# Patient Record
Sex: FEMALE | Race: WHITE | NOT HISPANIC OR LATINO | Employment: OTHER | URBAN - METROPOLITAN AREA
[De-identification: names, ages, dates, MRNs, and addresses within clinical notes are randomized per-mention and may not be internally consistent; named-entity substitution may affect disease eponyms.]

---

## 2018-01-11 ENCOUNTER — AMBULATORY - HEALTHEAST (OUTPATIENT)
Dept: MULTI SPECIALTY CLINIC | Facility: CLINIC | Age: 63
End: 2018-01-11

## 2018-01-11 ENCOUNTER — RECORDS - HEALTHEAST (OUTPATIENT)
Dept: ADMINISTRATIVE | Facility: OTHER | Age: 63
End: 2018-01-11

## 2019-04-08 ENCOUNTER — OFFICE VISIT - HEALTHEAST (OUTPATIENT)
Dept: INTERNAL MEDICINE | Facility: CLINIC | Age: 64
End: 2019-04-08

## 2019-04-08 ENCOUNTER — COMMUNICATION - HEALTHEAST (OUTPATIENT)
Dept: INTERNAL MEDICINE | Facility: CLINIC | Age: 64
End: 2019-04-08

## 2019-04-08 ENCOUNTER — COMMUNICATION - HEALTHEAST (OUTPATIENT)
Dept: TELEHEALTH | Facility: CLINIC | Age: 64
End: 2019-04-08

## 2019-04-08 DIAGNOSIS — Z00.00 MEDICARE ANNUAL WELLNESS VISIT, SUBSEQUENT: ICD-10-CM

## 2019-04-08 DIAGNOSIS — M25.50 MULTIPLE JOINT PAIN: ICD-10-CM

## 2019-04-08 DIAGNOSIS — B00.1 RECURRENT COLD SORES: ICD-10-CM

## 2019-04-08 DIAGNOSIS — Z71.84 TRAVEL ADVICE ENCOUNTER: ICD-10-CM

## 2019-04-08 DIAGNOSIS — Z12.4 CERVICAL CANCER SCREENING: ICD-10-CM

## 2019-04-08 DIAGNOSIS — K12.0 APHTHOUS ULCER: ICD-10-CM

## 2019-04-08 DIAGNOSIS — Z12.31 VISIT FOR SCREENING MAMMOGRAM: ICD-10-CM

## 2019-04-08 DIAGNOSIS — Z87.892 HISTORY OF ANAPHYLAXIS: ICD-10-CM

## 2019-04-08 DIAGNOSIS — Z78.0 ASYMPTOMATIC MENOPAUSAL STATE: ICD-10-CM

## 2019-04-08 RX ORDER — FEXOFENADINE HCL 180 MG/1
180 TABLET ORAL
Status: SHIPPED | COMMUNITY
Start: 2010-07-06

## 2019-04-08 RX ORDER — HYDROCORTISONE ACETATE 0.5 %
1 CREAM (GRAM) TOPICAL
Status: SHIPPED | COMMUNITY
Start: 2016-09-01

## 2019-04-08 RX ORDER — FERROUS SULFATE 325(65) MG
1 TABLET ORAL
Status: SHIPPED | COMMUNITY
Start: 2019-04-08

## 2019-04-08 RX ORDER — OMEGA-3-ACID ETHYL ESTERS 1 G/1
1000 CAPSULE, LIQUID FILLED ORAL
Status: SHIPPED | COMMUNITY
Start: 2016-09-01

## 2019-04-08 ASSESSMENT — MIFFLIN-ST. JEOR: SCORE: 1184.91

## 2019-04-18 ENCOUNTER — OFFICE VISIT - HEALTHEAST (OUTPATIENT)
Dept: MIDWIFE SERVICES | Facility: CLINIC | Age: 64
End: 2019-04-18

## 2019-04-18 DIAGNOSIS — Z12.4 CERVICAL CANCER SCREENING: ICD-10-CM

## 2019-04-18 ASSESSMENT — MIFFLIN-ST. JEOR: SCORE: 1184.06

## 2019-04-23 LAB
HPV SOURCE: NORMAL
HUMAN PAPILLOMA VIRUS 16 DNA: NEGATIVE
HUMAN PAPILLOMA VIRUS 18 DNA: NEGATIVE
HUMAN PAPILLOMA VIRUS FINAL DIAGNOSIS: NORMAL
HUMAN PAPILLOMA VIRUS OTHER HR: NEGATIVE
SPECIMEN DESCRIPTION: NORMAL

## 2019-04-26 ENCOUNTER — AMBULATORY - HEALTHEAST (OUTPATIENT)
Dept: OTHER | Facility: CLINIC | Age: 64
End: 2019-04-26

## 2019-06-07 ENCOUNTER — AMBULATORY - HEALTHEAST (OUTPATIENT)
Dept: OTHER | Facility: CLINIC | Age: 64
End: 2019-06-07

## 2019-07-15 ENCOUNTER — HOSPITAL ENCOUNTER (OUTPATIENT)
Dept: MAMMOGRAPHY | Facility: CLINIC | Age: 64
Discharge: HOME | End: 2019-07-15

## 2019-07-15 ENCOUNTER — RECORDS - HEALTHEAST (OUTPATIENT)
Dept: ADMINISTRATIVE | Facility: OTHER | Age: 64
End: 2019-07-15

## 2019-07-15 ENCOUNTER — RECORDS - HEALTHEAST (OUTPATIENT)
Dept: BONE DENSITY | Facility: CLINIC | Age: 64
End: 2019-07-15

## 2019-07-15 DIAGNOSIS — Z78.0 ASYMPTOMATIC MENOPAUSAL STATE: ICD-10-CM

## 2019-07-15 DIAGNOSIS — Z00.00 ENCOUNTER FOR GENERAL ADULT MEDICAL EXAMINATION WITHOUT ABNORMAL FINDINGS: ICD-10-CM

## 2019-07-15 DIAGNOSIS — Z12.31 VISIT FOR SCREENING MAMMOGRAM: ICD-10-CM

## 2019-07-16 ENCOUNTER — RECORDS - HEALTHEAST (OUTPATIENT)
Dept: ADMINISTRATIVE | Facility: OTHER | Age: 64
End: 2019-07-16

## 2019-07-16 ENCOUNTER — RECORDS - HEALTHEAST (OUTPATIENT)
Dept: RADIOLOGY | Facility: CLINIC | Age: 64
End: 2019-07-16

## 2019-09-05 ENCOUNTER — COMMUNICATION - HEALTHEAST (OUTPATIENT)
Dept: INTERNAL MEDICINE | Facility: CLINIC | Age: 64
End: 2019-09-05

## 2019-09-05 DIAGNOSIS — M25.50 MULTIPLE JOINT PAIN: ICD-10-CM

## 2019-09-14 ENCOUNTER — THERAPY VISIT (OUTPATIENT)
Dept: PHYSICAL THERAPY | Facility: CLINIC | Age: 64
End: 2019-09-14
Payer: COMMERCIAL

## 2019-09-14 DIAGNOSIS — M25.552 HIP PAIN, LEFT: Primary | ICD-10-CM

## 2019-09-14 PROCEDURE — 97161 PT EVAL LOW COMPLEX 20 MIN: CPT | Mod: GP | Performed by: PHYSICAL THERAPIST

## 2019-09-14 PROCEDURE — 97112 NEUROMUSCULAR REEDUCATION: CPT | Mod: GP | Performed by: PHYSICAL THERAPIST

## 2019-09-14 NOTE — PROGRESS NOTES
[facility] Initial Evaluation  Subjective:  [facility]                  [facility]  Physical Therapy Initial Examination/Evaluation  September 14, 2019    Esther Prince is a 64 year old  female referred to physical therapy by Dr. Fernandez for treatment of greater trochanteric bursitis with Precautions/Restrictions/MD instructions none    KEY PT FINDINGS:  1.  TTP piriformis  2.  Mild functional valgus in SL squat  3.  S/p FINA on contralateral limb    Subjective:  Referring MD visit date: 9/11/19  DOI/onset: 9/4/19  Mechanism of injury: On a bike trip, was playing pickle ball when she fell.  She continued to bike on the trip, hip became progressively more and more sore.  Unable to bike the last day, difficulty walking and sleeping.  Saw MD - underwent CSI, with significant relief of pain.   DOS R FINA - 2015  Previous treatment: CSI  Imaging: xray  Chief Complaint:   Can't walk fast, unable to sleep on side.  Can't stand up in spin class. Pain with ER and severe IR  Pain: best 0 /10, worst 5/10 lateral pain Described as: sharp Alleviated by: CSI Frequency: intermittent Progression of symptoms since initial onset: improving Time of day when pain is worse: not related  Sleeping: unable to sleep on side  Social history:  Cycling, paddling, X[employer] skiing    Occupation: Retired - [employer]  Job duties:  none    Current HEP/exercise regimen: see above  Patient's goals are reduce pain    Pertinent PMH: none   General Health Reported by Patient: excellent  Return to MD:  PRN         Lower Extremity Exam    Dynamic Movement Screen:  2 leg stance: WNL - toed out on R  2 leg squat:Normal    1 leg stance:   Right: normal  Left: normal    1 leg squat:   Right: normal  Left: excessive femoral IR/ADD    Gait: WNL      Quad Activation Normal Normal     Knee Joint AROM   Right Left Difference   Hyperextension 0 deg 0 deg 0 deg   Extension 0 deg 0  deg 0 deg   Flexion 140 deg 140 deg 0 deg     Palpation:   Tender to palpation at the following structures: piriformis    Hip Joint PROM Screen   Right Left Difference   Flex 100 deg 100 deg 0 deg   ER 60 deg 45 deg 15 deg   IR 20 deg 20 deg 0 deg     Lower Extremity Muscle Strength (x/5)   Right Left   Hip ABD 5/5 5-/5     Lower Extremity Flexibility Screen:   Right Left   Gastroc/ Soleus - -   - = normal  + = mild tightness  ++ = moderate tightness  +++ = significant tightness    Foot Screen:   neutral calcaneal orientation          Objective:  System    Physical Exam    General     ROS    Assessment/Plan:    Patient is a 64 year old female with left side hip complaints.    Patient has the following significant findings with corresponding treatment plan.                Diagnosis 1:  Greater trochanteric burisitis    Pain -  hot/cold therapy, US, electric stimulation, manual therapy, splint/taping/bracing/orthotics, self management, education and home program  Decreased ROM/flexibility - manual therapy and therapeutic exercise  Decreased joint mobility - manual therapy and therapeutic exercise  Decreased strength - therapeutic exercise and therapeutic activities  Impaired balance - neuro re-education and therapeutic activities  Decreased proprioception - neuro re-education and therapeutic activities  Impaired muscle performance - neuro re-education  Decreased function - therapeutic activities    Therapy Evaluation Codes:   1) History comprised of:   Personal factors that impact the plan of care:      None.    Comorbidity factors that impact the plan of care are:      None.     Medications impacting care: None.  2) Examination of Body Systems comprised of:   Body structures and functions that impact the plan of care:      Hip.   Activity limitations that impact the plan of care are:      Standing, Walking and Laying down.  3) Clinical presentation characteristics  are:   Stable/Uncomplicated.  4) Decision-Making    Low complexity using standardized patient assessment instrument and/or measureable assessment of functional outcome.  Cumulative Therapy Evaluation is: Low complexity.    Previous and current functional limitations:  (See Goal Flow Sheet for this information)    Short term and Long term goals: (See Goal Flow Sheet for this information)     Communication ability:  Patient appears to be able to clearly communicate and understand verbal and written communication and follow directions correctly.  Treatment Explanation - The following has been discussed with the patient:   RX ordered/plan of care  Anticipated outcomes  Possible risks and side effects  This patient would benefit from PT intervention to resume normal activities.   Rehab potential is good.    Frequency:  1 X week, once daily  Duration:  for 6 weeks  Discharge Plan:  Achieve all LTG.  Independent in home treatment program.  Reach maximal therapeutic benefit.    Please refer to the daily flowsheet for treatment today, total treatment time and time spent performing 1:1 timed codes.

## 2019-09-14 NOTE — LETTER
[facility]  [address] [address]  [phone]    2019    Re: Esther Prince   :   1955  MRN:  [id number]   REFERRING PHYSICIAN:   Nikko Fernandez    [facility]  Date of Initial Evaluation:  19  Visits:  Rxs Used: 1  Reason for Referral:  Hip pain, left    EVALUATION SUMMARY    [facility] Initial Evaluation  Subjective:                    [facility]  Physical Therapy Initial Examination/Evaluation    2019    Esther Prince is a 64 year old  female referred to physical therapy by Dr. Fernandez for treatment of greater trochanteric bursitis with Precautions/Restrictions/MD instructions none    KEY PT FINDINGS:  1.  TTP piriformis  2.  Mild functional valgus in SL squat  3.  S/p FINA on contralateral limb    Subjective:  Referring MD visit date: 19  DOI/onset: 19  Mechanism of injury: On a bike trip, was playing pickle ball when she fell.  She continued to bike on the trip, hip became progressively more and more sore.  Unable to bike the last day, difficulty walking and sleeping.  Saw MD - underwent CSI, with significant relief of pain.   DOS R FINA -   Previous treatment: CSI  Imaging: xray  Chief Complaint:   Can't walk fast, unable to sleep on side.  Can't stand up in spin class. Pain with ER and severe IR  Pain: best 0 /10, worst 5/10 lateral pain Described as: sharp Alleviated by: CSI Frequency: intermittent Progression of symptoms since initial onset: improving Time of day when pain is worse: not related  Sleeping: unable to sleep on side  Social history:  Cycling, paddling, X[employer] skiing    Occupation: Retired - [employer]  Job duties:  none    Current HEP/exercise regimen: see above  Patient's goals are reduce pain    Pertinent PMH: none   General Health Reported by Patient: excellent  Return to MD:  PRN   Re:  Esther Murrell Suresh   :   1955      Lower Extremity Exam    Dynamic Movement Screen:  2 leg stance: WNL - toed out on R  2 leg squat:Normal    1 leg stance:   Right: normal  Left: normal    1 leg squat:   Right: normal  Left: excessive femoral IR/ADD    Gait: WNL      Quad Activation Normal Normal     Knee Joint AROM   Right Left Difference   Hyperextension 0 deg 0 deg 0 deg   Extension 0 deg 0 deg 0 deg   Flexion 140 deg 140 deg 0 deg     Palpation:   Tender to palpation at the following structures: piriformis    Hip Joint PROM Screen   Right Left Difference   Flex 100 deg 100 deg 0 deg   ER 60 deg 45 deg 15 deg   IR 20 deg 20 deg 0 deg     Lower Extremity Muscle Strength (x/5)   Right Left   Hip ABD 5/5 5-/5     Lower Extremity Flexibility Screen:   Right Left   Gastroc/ Soleus - -   - = normal  + = mild tightness  ++ = moderate tightness  +++ = significant tightness    Foot Screen:   neutral calcaneal orientation    Assessment/Plan:    Patient is a 64 year old female with left side hip complaints.    Patient has the following significant findings with corresponding treatment plan.                Re: Esther Murrell Suresh   :   1955    Diagnosis 1:  Greater trochanteric burisitis    Pain -  hot/cold therapy, US, electric stimulation, manual therapy, splint/taping/bracing/orthotics, self management, education and home program  Decreased ROM/flexibility - manual therapy and therapeutic exercise  Decreased joint mobility - manual therapy and therapeutic exercise  Decreased strength - therapeutic exercise and therapeutic activities  Impaired balance - neuro re-education and therapeutic activities  Decreased proprioception - neuro re-education and therapeutic activities  Impaired muscle performance - neuro re-education  Decreased function - therapeutic activities    Therapy Evaluation Codes:   1) History comprised of:   Personal factors that impact the plan of care:      None.    Comorbidity factors that impact  the plan of care are:      None.     Medications impacting care: None.  2) Examination of Body Systems comprised of:   Body structures and functions that impact the plan of care:      Hip.   Activity limitations that impact the plan of care are:      Standing, Walking and Laying down.  3) Clinical presentation characteristics are:   Stable/Uncomplicated.  4) Decision-Making    Low complexity using standardized patient assessment instrument and/or measureable assessment of functional outcome.  Cumulative Therapy Evaluation is: Low complexity.    Previous and current functional limitations:  (See Goal Flow Sheet for this information)    Short term and Long term goals: (See Goal Flow Sheet for this information)     Communication ability:  Patient appears to be able to clearly communicate and understand verbal and written communication and follow directions correctly.  Treatment Explanation - The following has been discussed with the patient:   RX ordered/plan of care  Anticipated outcomes  Possible risks and side effects  This patient would benefit from PT intervention to resume normal activities.   Rehab potential is good.    Frequency:  1 X week, once daily/ Duration:  for 6 weeks  Discharge Plan:  Achieve all LTG, Independent in home treatment program, Reach maximal therapeutic benefit.    Please refer to the daily flowsheet for treatment today, total treatment time and time spent performing 1:1 timed codes.     Thank you for your referral.    INQUIRIES  Therapist: Zeina Oliveros DPT  [facility] [address] [address]

## 2019-09-18 ENCOUNTER — HOSPITAL ENCOUNTER (OUTPATIENT)
Dept: ULTRASOUND IMAGING | Facility: HOSPITAL | Age: 64
Discharge: HOME | End: 2019-09-18
Attending: INTERNAL MEDICINE

## 2019-09-18 ENCOUNTER — OFFICE VISIT - HEALTHEAST (OUTPATIENT)
Dept: INTERNAL MEDICINE | Facility: CLINIC | Age: 64
End: 2019-09-18

## 2019-09-18 DIAGNOSIS — Z00.00 ENCOUNTER FOR MEDICARE ANNUAL WELLNESS EXAM: ICD-10-CM

## 2019-09-18 DIAGNOSIS — Z11.59 ENCOUNTER FOR HEPATITIS C SCREENING TEST FOR LOW RISK PATIENT: ICD-10-CM

## 2019-09-18 DIAGNOSIS — Z00.00 HEALTH MAINTENANCE EXAMINATION: ICD-10-CM

## 2019-09-18 DIAGNOSIS — D72.829 LEUKOCYTOSIS, UNSPECIFIED TYPE: ICD-10-CM

## 2019-09-18 DIAGNOSIS — E78.2 MIXED HYPERLIPIDEMIA: ICD-10-CM

## 2019-09-18 DIAGNOSIS — B00.1 HERPES LABIALIS: ICD-10-CM

## 2019-09-18 DIAGNOSIS — Z23 FLU VACCINE NEED: ICD-10-CM

## 2019-09-18 DIAGNOSIS — R10.2 SUPRAPUBIC ABDOMINAL PAIN: ICD-10-CM

## 2019-09-18 DIAGNOSIS — Z23 NEED FOR PNEUMOCOCCAL VACCINATION: ICD-10-CM

## 2019-09-18 LAB
ALBUMIN SERPL-MCNC: 4.1 G/DL (ref 3.5–5)
ALBUMIN UR-MCNC: NEGATIVE MG/DL
ALP SERPL-CCNC: 75 U/L (ref 45–120)
ALT SERPL W P-5'-P-CCNC: 20 U/L (ref 0–45)
ANION GAP SERPL CALCULATED.3IONS-SCNC: 9 MMOL/L (ref 5–18)
APPEARANCE UR: CLEAR
AST SERPL W P-5'-P-CCNC: 21 U/L (ref 0–40)
BILIRUB DIRECT SERPL-MCNC: 0.3 MG/DL
BILIRUB SERPL-MCNC: 0.9 MG/DL (ref 0–1)
BILIRUB UR QL STRIP: NEGATIVE
BUN SERPL-MCNC: 13 MG/DL (ref 8–22)
CALCIUM SERPL-MCNC: 10 MG/DL (ref 8.5–10.5)
CHLORIDE BLD-SCNC: 103 MMOL/L (ref 98–107)
CHOLEST SERPL-MCNC: 199 MG/DL
CO2 SERPL-SCNC: 25 MMOL/L (ref 22–31)
COLOR UR AUTO: YELLOW
CREAT SERPL-MCNC: 0.93 MG/DL (ref 0.6–1.1)
ERYTHROCYTE [DISTWIDTH] IN BLOOD BY AUTOMATED COUNT: 11.5 % (ref 11–14.5)
ERYTHROCYTE [SEDIMENTATION RATE] IN BLOOD BY WESTERGREN METHOD: 11 MM/HR (ref 0–20)
FASTING STATUS PATIENT QL REPORTED: NO
GFR SERPL CREATININE-BSD FRML MDRD: 60 ML/MIN/1.73M2
GLUCOSE BLD-MCNC: 88 MG/DL (ref 70–125)
GLUCOSE UR STRIP-MCNC: NEGATIVE MG/DL
HCT VFR BLD AUTO: 42.5 % (ref 35–47)
HDLC SERPL-MCNC: 92 MG/DL
HGB BLD-MCNC: 14 G/DL (ref 12–16)
HGB UR QL STRIP: NEGATIVE
KETONES UR STRIP-MCNC: ABNORMAL MG/DL
LDLC SERPL CALC-MCNC: 96 MG/DL
LEUKOCYTE ESTERASE UR QL STRIP: NEGATIVE
MCH RBC QN AUTO: 30.7 PG (ref 27–34)
MCHC RBC AUTO-ENTMCNC: 33 G/DL (ref 32–36)
MCV RBC AUTO: 93 FL (ref 80–100)
NITRATE UR QL: NEGATIVE
PH UR STRIP: 5.5 [PH] (ref 5–8)
PLATELET # BLD AUTO: 259 THOU/UL (ref 140–440)
PMV BLD AUTO: 7.6 FL (ref 7–10)
POTASSIUM BLD-SCNC: 4.9 MMOL/L (ref 3.5–5)
PROT SERPL-MCNC: 6.8 G/DL (ref 6–8)
RBC # BLD AUTO: 4.57 MILL/UL (ref 3.8–5.4)
SODIUM SERPL-SCNC: 137 MMOL/L (ref 136–145)
SP GR UR STRIP: 1.02 (ref 1–1.03)
TRIGL SERPL-MCNC: 54 MG/DL
UROBILINOGEN UR STRIP-ACNC: ABNORMAL
WBC: 18.1 THOU/UL (ref 4–11)

## 2019-09-18 ASSESSMENT — MIFFLIN-ST. JEOR: SCORE: 1167.73

## 2019-09-19 ENCOUNTER — COMMUNICATION - HEALTHEAST (OUTPATIENT)
Dept: INTERNAL MEDICINE | Facility: CLINIC | Age: 64
End: 2019-09-19

## 2019-09-19 ENCOUNTER — THERAPY VISIT (OUTPATIENT)
Dept: PHYSICAL THERAPY | Facility: CLINIC | Age: 64
End: 2019-09-19
Payer: COMMERCIAL

## 2019-09-19 DIAGNOSIS — M25.552 LEFT HIP PAIN: Primary | ICD-10-CM

## 2019-09-19 DIAGNOSIS — D72.820 LYMPHOCYTOSIS: ICD-10-CM

## 2019-09-19 LAB
BASOPHILS # BLD AUTO: 0.1 THOU/UL (ref 0–0.2)
BASOPHILS NFR BLD AUTO: 0 % (ref 0–2)
EOSINOPHIL # BLD AUTO: 0.1 THOU/UL (ref 0–0.4)
EOSINOPHIL NFR BLD AUTO: 0 % (ref 0–6)
ERYTHROCYTE [DISTWIDTH] IN BLOOD BY AUTOMATED COUNT: 12.9 % (ref 11–14.5)
HCT VFR BLD AUTO: 42 % (ref 35–47)
HCV AB SERPL QL IA: NEGATIVE
HGB BLD-MCNC: 13.5 G/DL (ref 12–16)
LAB AP CHARGES (HE HISTORICAL CONVERSION): ABNORMAL
LYMPHOCYTES # BLD AUTO: 8.7 THOU/UL (ref 0.8–4.4)
LYMPHOCYTES NFR BLD AUTO: 49 % (ref 20–40)
MCH RBC QN AUTO: 30 PG (ref 27–34)
MCHC RBC AUTO-ENTMCNC: 32.1 G/DL (ref 32–36)
MCV RBC AUTO: 93 FL (ref 80–100)
MONOCYTES # BLD AUTO: 1 THOU/UL (ref 0–0.9)
MONOCYTES NFR BLD AUTO: 6 % (ref 2–10)
NEUTROPHILS # BLD AUTO: 8 THOU/UL (ref 2–7.7)
NEUTROPHILS NFR BLD AUTO: 45 % (ref 50–70)
PATH REPORT.COMMENTS IMP SPEC: ABNORMAL
PATH REPORT.COMMENTS IMP SPEC: ABNORMAL
PATH REPORT.FINAL DX SPEC: ABNORMAL
PATH REPORT.MICROSCOPIC SPEC OTHER STN: ABNORMAL
PATH REPORT.MICROSCOPIC SPEC OTHER STN: ABNORMAL
PATH REPORT.RELEVANT HX SPEC: ABNORMAL
PLATELET # BLD AUTO: 241 THOU/UL (ref 140–440)
PMV BLD AUTO: 10 FL (ref 8.5–12.5)
RBC # BLD AUTO: 4.5 MILL/UL (ref 3.8–5.4)
RESULT FLAG (HE HISTORICAL CONVERSION): ABNORMAL
WBC: 17.9 THOU/UL (ref 4–11)

## 2019-09-19 PROCEDURE — 97110 THERAPEUTIC EXERCISES: CPT | Mod: GP | Performed by: PHYSICAL THERAPIST

## 2019-09-19 PROCEDURE — 97140 MANUAL THERAPY 1/> REGIONS: CPT | Mod: GP | Performed by: PHYSICAL THERAPIST

## 2019-09-19 PROCEDURE — 97112 NEUROMUSCULAR REEDUCATION: CPT | Mod: GP | Performed by: PHYSICAL THERAPIST

## 2019-09-25 ENCOUNTER — COMMUNICATION - HEALTHEAST (OUTPATIENT)
Dept: INTERNAL MEDICINE | Facility: CLINIC | Age: 64
End: 2019-09-25

## 2019-09-25 DIAGNOSIS — K12.0 APHTHOUS ULCER: ICD-10-CM

## 2019-09-27 ENCOUNTER — COMMUNICATION - HEALTHEAST (OUTPATIENT)
Dept: ADMINISTRATIVE | Facility: HOSPITAL | Age: 64
End: 2019-09-27

## 2019-09-27 ENCOUNTER — THERAPY VISIT (OUTPATIENT)
Dept: PHYSICAL THERAPY | Facility: CLINIC | Age: 64
End: 2019-09-27
Payer: COMMERCIAL

## 2019-09-27 ENCOUNTER — COMMUNICATION - HEALTHEAST (OUTPATIENT)
Dept: INTERNAL MEDICINE | Facility: CLINIC | Age: 64
End: 2019-09-27

## 2019-09-27 DIAGNOSIS — M25.552 HIP PAIN, LEFT: Primary | ICD-10-CM

## 2019-09-27 PROCEDURE — 97110 THERAPEUTIC EXERCISES: CPT | Mod: GP | Performed by: PHYSICAL THERAPIST

## 2019-09-27 PROCEDURE — 97140 MANUAL THERAPY 1/> REGIONS: CPT | Mod: GP | Performed by: PHYSICAL THERAPIST

## 2019-09-27 NOTE — PROGRESS NOTES
S: Pt reports good compliance with HEP.  Up and down sx change from onset.  O: TTP: left IT band and glut med  SL squat valgus  A:  Pt is independent with a HEP, has not achieved goals but is working towards them thru HEP and is in agreement with discharge to self directed.  : cont self directed

## 2019-10-08 ENCOUNTER — OFFICE VISIT - HEALTHEAST (OUTPATIENT)
Dept: ONCOLOGY | Facility: HOSPITAL | Age: 64
End: 2019-10-08

## 2019-10-08 DIAGNOSIS — C91.10 CHRONIC LYMPHOCYTIC LEUKEMIA (H): ICD-10-CM

## 2019-10-08 DIAGNOSIS — D72.820 LYMPHOCYTOSIS: ICD-10-CM

## 2019-10-23 ENCOUNTER — COMMUNICATION - HEALTHEAST (OUTPATIENT)
Dept: INTERNAL MEDICINE | Facility: CLINIC | Age: 64
End: 2019-10-23

## 2019-10-23 DIAGNOSIS — B00.1 HERPES LABIALIS: ICD-10-CM

## 2019-10-24 ENCOUNTER — COMMUNICATION - HEALTHEAST (OUTPATIENT)
Dept: INTERNAL MEDICINE | Facility: CLINIC | Age: 64
End: 2019-10-24

## 2019-10-24 DIAGNOSIS — M25.50 MULTIPLE JOINT PAIN: ICD-10-CM

## 2020-03-11 ENCOUNTER — HEALTH MAINTENANCE LETTER (OUTPATIENT)
Age: 65
End: 2020-03-11

## 2020-03-23 ENCOUNTER — COMMUNICATION - HEALTHEAST (OUTPATIENT)
Dept: ONCOLOGY | Facility: HOSPITAL | Age: 65
End: 2020-03-23

## 2020-03-23 ENCOUNTER — COMMUNICATION - HEALTHEAST (OUTPATIENT)
Dept: INTERNAL MEDICINE | Facility: CLINIC | Age: 65
End: 2020-03-23

## 2020-10-19 ENCOUNTER — OFFICE VISIT - HEALTHEAST (OUTPATIENT)
Dept: INTERNAL MEDICINE | Facility: CLINIC | Age: 65
End: 2020-10-19

## 2020-10-19 DIAGNOSIS — E78.2 MIXED HYPERLIPIDEMIA: ICD-10-CM

## 2020-10-19 DIAGNOSIS — Z12.31 ENCOUNTER FOR SCREENING MAMMOGRAM FOR BREAST CANCER: ICD-10-CM

## 2020-10-19 DIAGNOSIS — C91.10 CHRONIC LYMPHOCYTIC LEUKEMIA (H): ICD-10-CM

## 2020-10-19 DIAGNOSIS — Z23 NEED FOR INFLUENZA VACCINATION: ICD-10-CM

## 2020-10-19 DIAGNOSIS — Z00.00 ENCOUNTER FOR ANNUAL WELLNESS VISIT (AWV) IN MEDICARE PATIENT: ICD-10-CM

## 2020-10-19 DIAGNOSIS — Z00.00 HEALTH MAINTENANCE EXAMINATION: ICD-10-CM

## 2020-10-19 LAB
ALBUMIN SERPL-MCNC: 4.2 G/DL (ref 3.5–5)
ALP SERPL-CCNC: 74 U/L (ref 45–120)
ALT SERPL W P-5'-P-CCNC: 20 U/L (ref 0–45)
ANION GAP SERPL CALCULATED.3IONS-SCNC: 9 MMOL/L (ref 5–18)
AST SERPL W P-5'-P-CCNC: 29 U/L (ref 0–40)
BASOPHILS # BLD AUTO: 0 THOU/UL (ref 0–0.2)
BASOPHILS NFR BLD AUTO: 0 % (ref 0–2)
BILIRUB SERPL-MCNC: 0.4 MG/DL (ref 0–1)
BUN SERPL-MCNC: 22 MG/DL (ref 8–22)
CALCIUM SERPL-MCNC: 9.5 MG/DL (ref 8.5–10.5)
CHLORIDE BLD-SCNC: 105 MMOL/L (ref 98–107)
CHOLEST SERPL-MCNC: 218 MG/DL
CO2 SERPL-SCNC: 25 MMOL/L (ref 22–31)
CREAT SERPL-MCNC: 0.88 MG/DL (ref 0.6–1.1)
EOSINOPHIL COUNT (ABSOLUTE): 0.3 THOU/UL (ref 0–0.4)
EOSINOPHIL NFR BLD AUTO: 2 % (ref 0–6)
ERYTHROCYTE [DISTWIDTH] IN BLOOD BY AUTOMATED COUNT: 12.9 % (ref 11–14.5)
FASTING STATUS PATIENT QL REPORTED: ABNORMAL
GFR SERPL CREATININE-BSD FRML MDRD: >60 ML/MIN/1.73M2
GLUCOSE BLD-MCNC: 89 MG/DL (ref 70–125)
HCT VFR BLD AUTO: 42 % (ref 35–47)
HDLC SERPL-MCNC: 85 MG/DL
HGB BLD-MCNC: 13 G/DL (ref 12–16)
IMMATURE GRANULOCYTE % - MAN (DIFF): 0 %
IMMATURE GRANULOCYTE ABSOLUTE - MAN (DIFF): 0 THOU/UL
LDLC SERPL CALC-MCNC: 114 MG/DL
LYMPHOCYTES # BLD AUTO: 10 THOU/UL (ref 0.8–4.4)
LYMPHOCYTES NFR BLD AUTO: 59 % (ref 20–40)
MCH RBC QN AUTO: 29.1 PG (ref 27–34)
MCHC RBC AUTO-ENTMCNC: 31 G/DL (ref 32–36)
MCV RBC AUTO: 94 FL (ref 80–100)
MONOCYTES # BLD AUTO: 0.5 THOU/UL (ref 0–0.9)
MONOCYTES NFR BLD AUTO: 3 % (ref 2–10)
OVALOCYTES: ABNORMAL
PLAT MORPH BLD: NORMAL
PLATELET # BLD AUTO: 219 THOU/UL (ref 140–440)
PMV BLD AUTO: 10.2 FL (ref 8.5–12.5)
POTASSIUM BLD-SCNC: 3.6 MMOL/L (ref 3.5–5)
PROT SERPL-MCNC: 6.9 G/DL (ref 6–8)
RBC # BLD AUTO: 4.47 MILL/UL (ref 3.8–5.4)
SMUDGE CELLS BLD QL SMEAR: PRESENT
SODIUM SERPL-SCNC: 139 MMOL/L (ref 136–145)
TOTAL NEUTROPHILS-ABS(DIFF): 6.1 THOU/UL (ref 2–7.7)
TOTAL NEUTROPHILS-REL(DIFF): 36 % (ref 50–70)
TRIGL SERPL-MCNC: 93 MG/DL
WBC: 16.9 THOU/UL (ref 4–11)

## 2020-10-19 ASSESSMENT — MIFFLIN-ST. JEOR: SCORE: 1168.18

## 2020-10-20 ENCOUNTER — OFFICE VISIT - HEALTHEAST (OUTPATIENT)
Dept: ONCOLOGY | Facility: HOSPITAL | Age: 65
End: 2020-10-20

## 2020-10-20 DIAGNOSIS — C91.10 CHRONIC LYMPHOCYTIC LEUKEMIA (H): ICD-10-CM

## 2020-10-21 ENCOUNTER — HOSPITAL ENCOUNTER (OUTPATIENT)
Dept: MAMMOGRAPHY | Facility: CLINIC | Age: 65
Discharge: HOME | End: 2020-10-21
Attending: INTERNAL MEDICINE

## 2020-10-21 ENCOUNTER — COMMUNICATION - HEALTHEAST (OUTPATIENT)
Dept: INTERNAL MEDICINE | Facility: CLINIC | Age: 65
End: 2020-10-21

## 2020-10-21 DIAGNOSIS — Z12.31 ENCOUNTER FOR SCREENING MAMMOGRAM FOR BREAST CANCER: ICD-10-CM

## 2020-10-21 LAB — C REACTIVE PROTEIN LHE: <0.1 MG/DL (ref 0–0.8)

## 2021-01-03 ENCOUNTER — HEALTH MAINTENANCE LETTER (OUTPATIENT)
Age: 66
End: 2021-01-03

## 2021-01-11 ENCOUNTER — COMMUNICATION - HEALTHEAST (OUTPATIENT)
Dept: INTERNAL MEDICINE | Facility: CLINIC | Age: 66
End: 2021-01-11

## 2021-01-26 ENCOUNTER — COMMUNICATION - HEALTHEAST (OUTPATIENT)
Dept: INTERNAL MEDICINE | Facility: CLINIC | Age: 66
End: 2021-01-26

## 2021-01-26 DIAGNOSIS — Z87.892 HISTORY OF ANAPHYLAXIS: ICD-10-CM

## 2021-01-27 RX ORDER — EPINEPHRINE 0.3 MG/.3ML
0.3 INJECTION SUBCUTANEOUS PRN
Qty: 2 | Refills: 2 | Status: SHIPPED | OUTPATIENT
Start: 2021-01-27 | End: 2024-05-16

## 2021-03-08 ENCOUNTER — COMMUNICATION - HEALTHEAST (OUTPATIENT)
Dept: INTERNAL MEDICINE | Facility: CLINIC | Age: 66
End: 2021-03-08

## 2021-03-30 ENCOUNTER — OFFICE VISIT - HEALTHEAST (OUTPATIENT)
Dept: INTERNAL MEDICINE | Facility: CLINIC | Age: 66
End: 2021-03-30

## 2021-03-30 DIAGNOSIS — M79.662 PAIN OF LEFT LOWER LEG: ICD-10-CM

## 2021-03-30 RX ORDER — TRIAMCINOLONE ACETONIDE 0.1 %
PASTE (GRAM) DENTAL
Qty: 5 G | Refills: 1 | Status: SHIPPED | OUTPATIENT
Start: 2021-03-30 | End: 2021-10-22

## 2021-04-09 ENCOUNTER — OFFICE VISIT - HEALTHEAST (OUTPATIENT)
Dept: PHYSICAL THERAPY | Facility: REHABILITATION | Age: 66
End: 2021-04-09

## 2021-04-09 DIAGNOSIS — M25.662 DECREASED RANGE OF MOTION OF LEFT LOWER EXTREMITY: ICD-10-CM

## 2021-04-09 DIAGNOSIS — M25.552 LEFT HIP PAIN: ICD-10-CM

## 2021-04-09 DIAGNOSIS — M54.10 RADICULAR PAIN OF LEFT LOWER EXTREMITY: ICD-10-CM

## 2021-04-14 ENCOUNTER — OFFICE VISIT - HEALTHEAST (OUTPATIENT)
Dept: PHYSICAL THERAPY | Facility: REHABILITATION | Age: 66
End: 2021-04-14

## 2021-04-14 DIAGNOSIS — M54.10 RADICULAR PAIN OF LEFT LOWER EXTREMITY: ICD-10-CM

## 2021-04-14 DIAGNOSIS — M25.662 DECREASED RANGE OF MOTION OF LEFT LOWER EXTREMITY: ICD-10-CM

## 2021-04-14 DIAGNOSIS — M25.552 LEFT HIP PAIN: ICD-10-CM

## 2021-04-21 ENCOUNTER — OFFICE VISIT - HEALTHEAST (OUTPATIENT)
Dept: PHYSICAL THERAPY | Facility: REHABILITATION | Age: 66
End: 2021-04-21

## 2021-04-21 DIAGNOSIS — M54.10 RADICULAR PAIN OF LEFT LOWER EXTREMITY: ICD-10-CM

## 2021-04-21 DIAGNOSIS — M25.662 DECREASED RANGE OF MOTION OF LEFT LOWER EXTREMITY: ICD-10-CM

## 2021-04-21 DIAGNOSIS — M25.552 LEFT HIP PAIN: ICD-10-CM

## 2021-04-23 ENCOUNTER — OFFICE VISIT - HEALTHEAST (OUTPATIENT)
Dept: PHYSICAL THERAPY | Facility: REHABILITATION | Age: 66
End: 2021-04-23

## 2021-04-23 DIAGNOSIS — M25.552 LEFT HIP PAIN: ICD-10-CM

## 2021-04-23 DIAGNOSIS — M25.662 DECREASED RANGE OF MOTION OF LEFT LOWER EXTREMITY: ICD-10-CM

## 2021-04-23 DIAGNOSIS — M54.10 RADICULAR PAIN OF LEFT LOWER EXTREMITY: ICD-10-CM

## 2021-04-25 ENCOUNTER — HEALTH MAINTENANCE LETTER (OUTPATIENT)
Age: 66
End: 2021-04-25

## 2021-04-28 ENCOUNTER — OFFICE VISIT - HEALTHEAST (OUTPATIENT)
Dept: PHYSICAL THERAPY | Facility: REHABILITATION | Age: 66
End: 2021-04-28

## 2021-04-28 DIAGNOSIS — M25.552 LEFT HIP PAIN: ICD-10-CM

## 2021-04-28 DIAGNOSIS — M25.662 DECREASED RANGE OF MOTION OF LEFT LOWER EXTREMITY: ICD-10-CM

## 2021-04-28 DIAGNOSIS — M54.10 RADICULAR PAIN OF LEFT LOWER EXTREMITY: ICD-10-CM

## 2021-05-24 ENCOUNTER — RECORDS - HEALTHEAST (OUTPATIENT)
Dept: ADMINISTRATIVE | Facility: CLINIC | Age: 66
End: 2021-05-24

## 2021-05-25 ENCOUNTER — RECORDS - HEALTHEAST (OUTPATIENT)
Dept: ADMINISTRATIVE | Facility: CLINIC | Age: 66
End: 2021-05-25

## 2021-05-27 ENCOUNTER — OFFICE VISIT - HEALTHEAST (OUTPATIENT)
Dept: PHYSICAL THERAPY | Facility: REHABILITATION | Age: 66
End: 2021-05-27

## 2021-05-27 DIAGNOSIS — M25.552 LEFT HIP PAIN: ICD-10-CM

## 2021-05-27 DIAGNOSIS — M25.662 DECREASED RANGE OF MOTION OF LEFT LOWER EXTREMITY: ICD-10-CM

## 2021-05-27 DIAGNOSIS — M54.10 RADICULAR PAIN OF LEFT LOWER EXTREMITY: ICD-10-CM

## 2021-05-27 NOTE — PATIENT INSTRUCTIONS - HE
goodrx    Whole Foods, Plant-Based    Abundant vegetables.    Only whole grains.    2-4 fruits/day.    Avoid animal products such as dairy, eggs and meat. (instead, take a vitamin b12 supplement)    Avoid sugars, oils and other processed foods.    Documentary: [place]     Web: Nutritionstudies.org, Nutritionfacts.org    Books: How Not to Die (Mimi), The [place] Study (Rahul)      Start taking probiotics daily before leaving.  May take pepto bismol preventatively prior to eating or may take if develop diarrhea. If not having significant abdominal pain, fever or bloody stools, may take immodium for diarrhea. If prescribed an antibiotic, may take this if immodium is not effective and having many watery stools. If develop significant abdominal pain, fever or bloody stools, seek medical attention and don't take immodium or any antibiotic.        Food: Recommend only consuming food from reputable sources and avoiding street food.  Avoid fresh vegetables and fruits without a peel.  Eat only food that is fresh and steaming hot.    Drink: Stay hydrated.  Drink only bottled beverages that are still sealed.  Do not use ice.  Use sterile water (bottled, boiled and cooled, adequately filtered/treated) for drinking, brushing teeth.  Don't open mouth in shower.    Mosquito/insect avoidance: Cover skin with clothing as much as possible.  Apply insect repellant regularly.  Avoid being outdoors at times of day when local mosquitos are most active.  Use mosquito nets.  Can treat clothing with insect repellant.     Sun: Apply sunscreen regularly.    Swimming: Avoid swimming in freshwater.  Avoid walking barefoot at any time including beaches.    Animals: Avoid local animals as much as possible.  If you are bitten by an animal, seek medical attention immediately for possible rabies treatment/prevention.    DVT (blood clot) prevention: Recommend getting up to walk every 1-2 hours while in transit. Frequently flex/extend  ankles and knees. Avoid sedatives/alcohol. Consider wearing graduated compression stockings 15-30 mmhg.    Medical: Take along adequate supply of any medication you use regularly. Seek out a reputable clinic/hospital that you can utilize if needed.  I recommend that you acquire health insurance/evacuation insurance for your trip. Visit CDC website: https://wwwnc.cdc.gov/travel/page/getting-health-care-abroad    Infectious disease:  Standard infectious disease precautions should be exercised abroad regarding frequent hand washing/, sexual contact, etc. Avoid contact with body fluids when possible and seek medical attention if there is contact with body fluid.     Packing: I recommend that you consult the cdc recommendations for items to consider packing: https://wwwnc.cdc.gov/travel/page/pack-smart

## 2021-05-27 NOTE — PROGRESS NOTES
Assessment and Plan:       1. Visit for screening mammogram     - Mammo Screening Bilateral; Future    2. Aphthous ulcer     - triamcinolone (KENALOG) 0.1 % paste; Apply to teeth 3 (three) times a day for 5 days.  Dispense: 5 g; Refill: 1    3. Recurrent cold sores     - valACYclovir (VALTREX) 1000 MG tablet; Take 2 tablets (2,000 mg total) by mouth 2 (two) times a day for 1 day.  Dispense: 12 tablet; Refill: 1    4. Medicare annual wellness visit, subsequent     - DXA Bone Density Scan; Future    5. Multiple joint pain     - diclofenac sodium (VOLTAREN) 1 % Gel; Apply 2 g topically every 6 (six) hours as needed. Apply 2 grams per application.  Dispense: 1 Tube; Refill: 1    6. History of anaphylaxis     - EPINEPHrine (EPIPEN 2-DAVID) 0.3 mg/0.3 mL injection; Inject 0.3 mL (0.3 mg total) into the shoulder, thigh, or buttocks as needed for anaphylaxis.  Dispense: 2 Pre-filled Pen Syringe; Refill: 0    7. Cervical cancer screening     - Ambulatory referral to Gynecology    8. Travel advice encounter     - azithromycin (ZITHROMAX) 500 MG tablet; Take 2 tablets (1,000 mg total) by mouth once for 1 dose.  Dispense: 2 tablet; Refill: 0  - typhoid (VIVOTIF) SR capsule; Take 1 capsule by mouth every other day for 4 doses.  Dispense: 4 capsule; Refill: 0    9. Asymptomatic menopausal state      - DXA Bone Density Scan; Future     The patient's current medical problems were reviewed.    Has had both pneumococcal vaccinations, has healthcare directive printed and given to us today.  Is in need of DEXA scan, had colonoscopy very recently, they recommended repeating in 10 years.  Will need one more cervical cancer screen but after that if it is normal we will not need any more, I did refer her to gynecology since I was able to visualize her cervix.  Return to clinic in 1 year.  I have had an Advance Directives discussion with the patient.  The following health maintenance schedule was reviewed with the patient and provided in  printed form in the after visit summary:   Health Maintenance   Topic Date Due     MAMMOGRAM  1955     TD 18+ HE  02/14/1971     ADVANCE DIRECTIVES DISCUSSED WITH PATIENT  02/14/1971     COLONOSCOPY  02/14/2003     DXA SCAN  02/14/2018     PNEUMOCOCCAL POLYSACCHARIDE VACCINE AGE 65 AND OVER  02/14/2018     PNEUMOCOCCAL CONJUGATE VACCINE FOR ADULTS (PCV13 OR PREVNAR)  02/14/2018     ZOSTER VACCINES (1 of 2) 09/25/2018     FALL RISK ASSESSMENT  04/08/2020     INFLUENZA VACCINE RULE BASED  Completed        Subjective:   Chief Complaint: Esther Prince is an 64 y.o. female here for an annual Medicare visit.   HPI:      Healthy Habits:   Occupation:retired  Marital status:  Alcohol use:n  Tobacco use:n  Illicit drug use:n      Concern for abuse or neglect:no    Last visit to dentist:y  Optometrist:y        No vaginal bleeding    May have had abnormal pap smear - long time ago.  Has had regular good pap smears recently.   Needs a Dexa  Would like acyclovir for cold sores  Uses triamcinolone dental paste for aphthous ulcers.    azithro for [place]  Would like diclofenac gel for joints, uses intermittently    Has healthcare directive copy with her.    Immunization status: up to date and documented, has had both pneumonia vaccinations, needs vivotif.    Current Diet:  well balanced diet  Amount Consumed Per Day  balanced    Exercise Type: elliptical, bike  Exercise Frequency: Daily exercise      Depression Screening Tool:  phq 2    Depression Screening Tool Score:  0  Cognitive Impairment and Additional Screening:  No difficulty.    Functional Ability/Level of Safety  Fall Risk Factors:  none  Home Safety Risk Factors: none    Advanced Directive:  has healthcare directive/poa    Get up and go <12 sec      Review of Systems:     Please see above.  The rest of the review of systems are negative for all systems.    Patient Care Team:  Provider, No Primary Care as PCP - General     There is no problem  list on file for this patient.    No past medical history on file.   No past surgical history on file.   No family history on file.   Social History     Socioeconomic History     Marital status:      Spouse name: Not on file     Number of children: Not on file     Years of education: Not on file     Highest education level: Not on file   Occupational History     Not on file   Social Needs     Financial resource strain: Not on file     Food insecurity:     Worry: Not on file     Inability: Not on file     Transportation needs:     Medical: Not on file     Non-medical: Not on file   Tobacco Use     Smoking status: Never Smoker     Smokeless tobacco: Never Used   Substance and Sexual Activity     Alcohol use: Not on file     Drug use: Not on file     Sexual activity: Not on file   Lifestyle     Physical activity:     Days per week: Not on file     Minutes per session: Not on file     Stress: Not on file   Relationships     Social connections:     Talks on phone: Not on file     Gets together: Not on file     Attends Sikhism service: Not on file     Active member of club or organization: Not on file     Attends meetings of clubs or organizations: Not on file     Relationship status: Not on file     Intimate partner violence:     Fear of current or ex partner: Not on file     Emotionally abused: Not on file     Physically abused: Not on file     Forced sexual activity: Not on file   Other Topics Concern     Not on file   Social History Narrative     Not on file       Current Outpatient Medications   Medication Sig Dispense Refill     acetaminophen (TYLENOL) 325 MG tablet Take 325 mg by mouth.       acyclovir (ZOVIRAX) 400 MG tablet Take 400 mg by mouth.       calcium, as carbonate, (MRPB-[id number]-747) 500 mg calcium (1,250 mg) tablet Take 1 tablet by mouth.       EPINEPHrine (EPIPEN 2-DAVID) 0.3 mg/0.3 mL injection Inject 0.3 mL (0.3 mg total) into the shoulder, thigh, or buttocks as needed for anaphylaxis. 2  "Pre-filled Pen Syringe 0     ferrous sulfate 325 (65 FE) MG tablet Take 1 tablet by mouth daily with breakfast.       fexofenadine (ALLEGRA ALLERGY) 180 MG tablet Take 180 mg by mouth.       fluticasone propionate (FLONASE) 50 mcg/actuation nasal spray 1 spray.       glucosam bernstein dip-chondroit-C-Mn 582-041-21-3 mg cap Take 1 capsule by mouth.       multivitamin (ONE A DAY) per tablet Take 1 tablet by mouth.       omega-3 acid ethyl esters (LOVAZA) 1 gram capsule Take 1,000 mg by mouth.       azithromycin (ZITHROMAX) 500 MG tablet Take 2 tablets (1,000 mg total) by mouth once for 1 dose. 2 tablet 0     diclofenac sodium (VOLTAREN) 1 % Gel Apply 2 g topically every 6 (six) hours as needed. Apply 2 grams per application. 1 Tube 1     triamcinolone (KENALOG) 0.1 % paste Apply to teeth 3 (three) times a day for 5 days. 5 g 1     typhoid (VIVOTIF) SR capsule Take 1 capsule by mouth every other day for 4 doses. 4 capsule 0     valACYclovir (VALTREX) 1000 MG tablet Take 2 tablets (2,000 mg total) by mouth 2 (two) times a day for 1 day. 12 tablet 1     No current facility-administered medications for this visit.       Objective:   Vital Signs:   Visit Vitals  /66 (Patient Site: Left Arm, Patient Position: Sitting, Cuff Size: Adult Regular)   Pulse 70   Ht 5' 7\" (1.702 m)   Wt 137 lb 3 oz (62.2 kg)   SpO2 98%   BMI 21.49 kg/m           VisionScreening:   Visual Acuity Screening    Right eye Left eye Both eyes   Without correction:      With correction: 20/16 20/16 20/16        PHYSICAL EXAM  Gen: NAD, conversant, appears age, well-kempt  Skin: warm, dry, no rash, pallor cyanosis  HENT: normocephalic atraumatic, MMM, no oral lesions, otorrhea, rhinorrhea. TM's normal bilaterally.  Eyes: non-icteric, extra-ocular movements intact, PERRL, conjunctivae not injected. Holding eyes open comfortably, no drainage.  CV: NRRR no m/r/g, no peripheral edema. no JVD.  Resp: CTAB no w/r/r, normal respiratory effort  GI: soft, " non-tender, non-distended. No masses.  MSK: no muscle or joint swelling.  Neuro: no dysarthria or gross asymmetry  Psych: full affect, oriented x 3  Lymph: No significant cervical lymphadenopathy  Hematologic: No petechiae or purpura.    Some white clumps in vaginal vault.  Unable to fully visualize cervix.    Assessment Results 4/8/2019   Activities of Daily Living No help needed   Instrumental Activities of Daily Living No help needed   Get Up and Go Score Less than 12 seconds   Mini Cog Total Score 5   Some recent data might be hidden     A Mini Cog score of 0-2 suggests the possibility of dementia, score of 3-5 suggests no dementia    Identified Health Risks:

## 2021-05-27 NOTE — PROGRESS NOTES
Assessment:   1.  Cervical cancer screening     Plan:      1. Discussed nutrition and exercise.    2. Blood tests: declines all HM /screening labs  3. Breast self exam technique reviewed and patient encouraged to perform self-exam monthly.  Encouraged to attend scheduled screening mammogram.  4. Contraception: Postmenopausal  5.  Next pap due today.  Discussed [place] preventative task force recommendation for cervical cancer screening through 65 years of age.  Considering patient's monogamous long-term relationship, today may be at the last Pap smear indicated.  6.  RTC 1 year for annual physical exam, PRN    Subjective:      Esther Prince is a 64 y.o. female who presents for cervical cancer screening.        Immunization History   Administered Date(s) Administered     Hep A, Adult IM (19yr & older) 2007, 2014     Hep B, Adult 2017, 2017, 2018     Influenza high dose, seasonal 2018     Influenza, seasonal,quad inj 36+ mos 2013     Influenza,seasonal quad, PF, 36+MOS 2014, 2015, 2016, 2017     Influenza,seasonal, Inj IIV3 2009, 2010, 2011, 10/14/2012     Tdap 10/01/2009, 2014     Typhoid, Inj, Inactive 2014, 2017     ZOSTER, LIVE 2013     ZOSTER, RECOMBINANT, IM 2018, 2018         Gynecologic History  No LMP recorded. Patient is postmenopausal.  Contraception: post menopausal status    Cancer screening:   Last Pap: 2013. Results were: normal      OB History    Para Term  AB Living   0 0 0 0 0 0   SAB TAB Ectopic Multiple Live Births   0 0 0 0 0       Current Outpatient Medications   Medication Sig Dispense Refill     calcium, as carbonate, ([id number]) 500 mg calcium (1,250 mg) tablet Take 1 tablet by mouth.       diclofenac sodium (VOLTAREN) 1 % Gel Apply 2 g topically every 6 (six) hours as needed. Apply 2 grams per application. 1 Tube 1     ferrous sulfate 325 (65 FE)  MG tablet Take 1 tablet by mouth daily with breakfast.       glucosam bernstein dip-chondroit-C-Mn 448-450-02-3 mg cap Take 1 capsule by mouth.       multivitamin (ONE A DAY) per tablet Take 1 tablet by mouth.       omega-3 acid ethyl esters (LOVAZA) 1 gram capsule Take 1,000 mg by mouth.       acetaminophen (TYLENOL) 325 MG tablet Take 325 mg by mouth.       acyclovir (ZOVIRAX) 400 MG tablet Take 400 mg by mouth.       EPINEPHrine (EPIPEN 2-DAVID) 0.3 mg/0.3 mL injection Inject 0.3 mL (0.3 mg total) into the shoulder, thigh, or buttocks as needed for anaphylaxis. 2 Pre-filled Pen Syringe 0     fexofenadine (ALLEGRA ALLERGY) 180 MG tablet Take 180 mg by mouth.       fluticasone propionate (FLONASE) 50 mcg/actuation nasal spray 1 spray.       No current facility-administered medications for this visit.      History reviewed. No pertinent past medical history.  Past Surgical History:   Procedure Laterality Date     TOTAL HIP ARTHROPLASTY Right 2015     Patient has no known allergies.  Family History   Problem Relation Age of Onset     Arthritis Mother      Dementia Mother      Diabetes type II Mother      Stroke Mother      Heart disease Father      Stroke Father      Arthritis Sister      Alcohol abuse Brother      Stroke Paternal Grandfather      Early death Paternal Grandfather      Diabetes type I Maternal Aunt      Early death Maternal Aunt      Stroke Maternal Aunt      Heart disease Paternal Aunt      Stroke Paternal Aunt      Stroke Paternal Uncle      Social History     Socioeconomic History     Marital status:      Spouse name: Constantino     Number of children: Not on file     Years of education: 18     Highest education level: Master's degree (e.g., MA, MS, [employer], MEd, MSW, [employer])   Occupational History     Occupation: Retired   Social Needs     Financial resource strain: Not on file     Food insecurity:     Worry: Not on file     Inability: Not on file     Transportation needs:     Medical: Not on file      "Non-medical: Not on file   Tobacco Use     Smoking status: Never Smoker     Smokeless tobacco: Never Used   Substance and Sexual Activity     Alcohol use: Not on file     Drug use: Not on file     Sexual activity: Not on file   Lifestyle     Physical activity:     Days per week: Not on file     Minutes per session: Not on file     Stress: Not on file   Relationships     Social connections:     Talks on phone: Not on file     Gets together: Not on file     Attends Congregation service: Not on file     Active member of club or organization: Not on file     Attends meetings of clubs or organizations: Not on file     Relationship status: Not on file     Intimate partner violence:     Fear of current or ex partner: Not on file     Emotionally abused: Not on file     Physically abused: Not on file     Forced sexual activity: Not on file   Other Topics Concern     Not on file   Social History Narrative     Not on file       Review of Systems  General:  Denies problem  Eyes: Denies problem  Ears/Nose/Throat: Denies problem  Cardiovascular: Denies problem  Respiratory:  Denies problem  Gastrointestinal:  denies problems  Genitourinary: denies problems  Musculoskeletal:  Denies problem  Skin: Denies problem  Neurologic:denies problems  Psychiatric: denies problems  Endocrine: denies problems        Objective:         Vitals:    04/18/19 1213   BP: 106/64   Pulse: 68   Weight: 137 lb (62.1 kg)   Height: 5' 7\" (1.702 m)       Physical Exam:  General Appearance: Alert, cooperative, no distress, appears stated age  Skin: Skin color, texture, turgor normal, no rashes or lesions  Pelvic:External genitalia normal without lesions or irritation.  Mucosa is pale and somewhat dry.  Vagina and cervix show no lesions, inflammation, discharge or tenderness. No cystocele, No rectocele. Uterus & adnexal normal without masses or tenderness.       "

## 2021-06-01 ENCOUNTER — COMMUNICATION - HEALTHEAST (OUTPATIENT)
Dept: INTERNAL MEDICINE | Facility: CLINIC | Age: 66
End: 2021-06-01
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
## 2021-06-01 NOTE — TELEPHONE ENCOUNTER
RN cannot approve Refill Request    RN can NOT refill this medication med is not covered by policy/route to provider. Last office visit: Visit date not found Last Physical: 4/8/2019 Last MTM visit: Visit date not found Last visit same specialty: Visit date not found.  Next visit within 3 mo: Visit date not found  Next physical within 3 mo: Visit date not found      Cristin Sneed, [facility] Triage/Med Refill 9/5/2019    Requested Prescriptions   Pending Prescriptions Disp Refills     diclofenac sodium (VOLTAREN) 1 % Gel [Pharmacy Med Name: DICLOFENAC SODIUM 1% GEL] 100 g 1     Sig: APPLY 2 G TOPICALLY EVERY 6 (SIX) HOURS AS NEEDED. APPLY 2 GRAMS PER APPLICATION.       There is no refill protocol information for this order

## 2021-06-01 NOTE — TELEPHONE ENCOUNTER
Called pt . All labs , US discussed . No further action needed for those . White count is high , lymphocytosis noted . Has been chronic so unlikely due to malignancy but CLL is a possibility so will refer to a hem otologist  To see if further work up is needed. Pt agreed .

## 2021-06-01 NOTE — PATIENT INSTRUCTIONS - HE
Labs future fasting .     Advance Directive  Patient s advance directive was discussed and I am comfortable with the patient s wishes.  Patient Education   Personalized Prevention Plan  You are due for the preventive services outlined below.  Your care team is available to assist you in scheduling these services.  If you have already completed any of these items, please share that information with your care team to update in your medical record.  Health Maintenance   Topic Date Due     HEPATITIS C SCREENING  1955     PNEUMOCOCCAL POLYSACCHARIDE VACCINE AGE 65 AND OVER  02/14/2018     PNEUMOCOCCAL CONJUGATE VACCINE FOR ADULTS (PCV13 OR PREVNAR)  02/14/2018     INFLUENZA VACCINE RULE BASED (1) 08/01/2019     COLONOSCOPY  09/26/2019 (Originally 2/14/2003)     FALL RISK ASSESSMENT  04/08/2020     MEDICARE ANNUAL WELLNESS VISIT  04/18/2020     MAMMOGRAM  07/15/2021     DXA SCAN  07/15/2021     ADVANCE CARE PLANNING  06/07/2024     TD 18+ HE  09/08/2024     ZOSTER VACCINES  Completed

## 2021-06-01 NOTE — TELEPHONE ENCOUNTER
RN cannot approve Refill Request    RN can NOT refill this medication Protocol failed and NO refill given       . Last office visit: Visit date not found Last Physical: 4/8/2019 Last MTM visit: Visit date not found Last visit same specialty: Visit date not found.  Next visit within 3 mo: Visit date not found  Next physical within 3 mo: Visit date not found      Ivett Domingo, [facility] Triage/Med Refill 9/26/2019    Requested Prescriptions   Pending Prescriptions Disp Refills     triamcinolone (KENALOG) 0.1 % paste [Pharmacy Med Name: TRIAMCINOLONE 0.1% PASTE] 5 g 1     Sig: APPLY TO TEETH 3 (THREE) TIMES A DAY FOR 5 DAYS.       There is no refill protocol information for this order

## 2021-06-01 NOTE — TELEPHONE ENCOUNTER
Cami,    Can we make sure they call her cell phone?    Thank you.    Kathy PICKARD LPN .......... 1:03 PM  09/27/19

## 2021-06-01 NOTE — PROGRESS NOTES
Assessment and Plan:   1. Flu vaccine need    - Influenza High Dose,Seasonal,PF 65+ Yrs    2. Health maintenance examination  Colon 2108 no polyps    mammo every other year   dexa 2019   Pap 2019 normal     3. Mixed hyperlipidemia  Past history of mild hyperlipidemia we will order future labs because she is not fasting today.  - Lipid Furman FASTING; Future  - Glucose; Future  - Lipid Furman FASTING    4. Encounter for hepatitis C screening test for low risk patient  Should get one-time screening due to age  - Hepatitis C Antibody (Anti-HCV); Future  - Hepatitis C Antibody (Anti-HCV)    5. Suprapubic abdominal pain  Nonspecific suprapubic abdominal pain that was just a one-time episode lasted for about an hour.  Associated with mild constipation.  No significant urinary changes, otherwise bowel habits have been regular she passes stool twice a day on a routine basis.  No increase in bloating.  Has been otherwise healthy with no nausea, loss of appetite.  No change in weight.  No fever or night sweats.  Reassured her that because she is absolutely pain-free now we will do some basic evaluation check her blood count urine analysis.  And transvaginal ultrasound just because she has some mild hot flashes and history of mild adnexal pain.  If these tests are all normal she does not really need further work-up unless the pain relapses.  - Basic Metabolic Panel  - HM2(CBC w/o Differential)  - Urinalysis-UC if Indicated  - Hepatic Profile  - Erythrocyte Sedimentation Rate  - US Pelvis With Transvaginal Non OB; Future    6. Need for pneumococcal vaccination  She is due for her main first coccal shot.  And  - Pneumococcal polysaccharide vaccine 23-valent 3 yo or older, subq/IM    7. Leukocytosis, unspecified type  Her white count has been high in the past will repeat today.  In review her past history  - Morphology,Smear Review (MORP)    8. Encounter for Medicare annual wellness exam  Her mini cog is excellent there are  no concerns with her IADLs and ADLs he lives independently.  Has an excellent lifestyle.  No issues with her mental health, sleep balance or memory.    9. Herpes labialis  She has recurrent cold sores for which she takes Valtrex for breakouts.      Overall very pleasant patient in excellent health    The patient's current medical problems were reviewed.    I have had an Advance Directives discussion with the patient.  The following health maintenance schedule was reviewed with the patient and provided in printed form in the after visit summary:   Health Maintenance   Topic Date Due     HEPATITIS C SCREENING  1955     PNEUMOCOCCAL POLYSACCHARIDE VACCINE AGE 65 AND OVER  02/14/2018     PNEUMOCOCCAL CONJUGATE VACCINE FOR ADULTS (PCV13 OR PREVNAR)  02/14/2018     INFLUENZA VACCINE RULE BASED (1) 08/01/2019     COLONOSCOPY  09/26/2019 (Originally 2/14/2003)     FALL RISK ASSESSMENT  04/08/2020     MEDICARE ANNUAL WELLNESS VISIT  04/18/2020     MAMMOGRAM  07/15/2021     DXA SCAN  07/15/2021     ADVANCE CARE PLANNING  06/07/2024     TD 18+ HE  09/08/2024     ZOSTER VACCINES  Completed        Subjective:   Chief Complaint: Esther Prince is an 64 y.o. female here for an Annual Wellness visit.   HPI:    Chief Complaint   Patient presents with     Annual Wellness Visit     recent ab pain     Pain in suprapubic pain intense . Has had some hot flashes . Lasted only one hour , no change in bowel habits or urine . Didn't feel like eating but now is back to normal  Review of Systems:  Please see above.  The rest of the review of systems are negative for all systems.    Patient Care Team:  Provider, No Primary Care as PCP - General     Patient Active Problem List   Diagnosis     Herpes labialis     Seasonal allergic rhinitis     Health maintenance examination     No past medical history on file.   Past Surgical History:   Procedure Laterality Date     TOTAL HIP ARTHROPLASTY Right 2015      Family History   Problem Relation Age  of Onset     Arthritis Mother      Dementia Mother      Diabetes type II Mother      Stroke Mother      Heart disease Father      Stroke Father      Arthritis Sister      Alcohol abuse Brother      Stroke Paternal Grandfather      Early death Paternal Grandfather      Diabetes type I Maternal Aunt      Early death Maternal Aunt      Stroke Maternal Aunt      Heart disease Paternal Aunt      Stroke Paternal Aunt      Stroke Paternal Uncle       Social History     Socioeconomic History     Marital status:      Spouse name: [facility]     Number of children: Not on file     Years of education: 18     Highest education level: Master's degree (e.g., MA, MS, [employer], MEd, MSW, [employer])   Occupational History     Occupation: Retired   Social Needs     Financial resource strain: Not on file     Food insecurity:     Worry: Not on file     Inability: Not on file     Transportation needs:     Medical: Not on file     Non-medical: Not on file   Tobacco Use     Smoking status: Never Smoker     Smokeless tobacco: Never Used   Substance and Sexual Activity     Alcohol use: Not on file     Drug use: Not on file     Sexual activity: Not on file   Lifestyle     Physical activity:     Days per week: Not on file     Minutes per session: Not on file     Stress: Not on file   Relationships     Social connections:     Talks on phone: Not on file     Gets together: Not on file     Attends Denominational service: Not on file     Active member of club or organization: Not on file     Attends meetings of clubs or organizations: Not on file     Relationship status: Not on file     Intimate partner violence:     Fear of current or ex partner: Not on file     Emotionally abused: Not on file     Physically abused: Not on file     Forced sexual activity: Not on file   Other Topics Concern     Not on file   Social History Narrative    Lives with   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       acyclovir (ZOVIRAX) 400 MG tablet Take 400 mg by mouth.       calcium carbonate-vitamin D3 600 mg calcium- 200 unit cap Take by mouth.       calcium, as carbonate, ([id number]) 500 mg calcium (1,250 mg) tablet Take 1 tablet by mouth.       diclofenac sodium (VOLTAREN) 1 % Gel APPLY 2 G TOPICALLY EVERY 6 (SIX) HOURS AS NEEDED. APPLY 2 GRAMS PER APPLICATION.  1     EPINEPHrine (EPIPEN 2-DAVID) 0.3 mg/0.3 mL injection Inject 0.3 mL (0.3 mg total) into the shoulder, thigh, or buttocks as needed for anaphylaxis. 2 Pre-filled Pen Syringe 0     EPINEPHrine (EPIPEN JR) 0.15 mg/0.3 mL injection Inject into the shoulder, thigh, or buttocks.       ferrous sulfate 325 (65 FE) MG tablet Take 1 tablet by mouth daily with breakfast.       fexofenadine (ALLEGRA ALLERGY) 180 MG tablet Take 180 mg by mouth.       fluticasone propionate (FLONASE) 50 mcg/actuation nasal spray 1 spray.       glucosam bernstein dip-chondroit-C-Mn 517-518-10-3 mg cap Take 1 capsule by mouth.       multivitamin (ONE A DAY) per tablet Take 1 tablet by mouth.       omega-3 acid ethyl esters (LOVAZA) 1 gram capsule Take 1,000 mg by mouth.       triamcinolone (KENALOG) 0.1 % paste APPLY TO TEETH 3 (THREE) TIMES A DAY FOR 5 DAYS.  1     No current facility-administered medications for this visit.       Objective:   Vital Signs:   Visit Vitals  /62 (Patient Site: Right Arm)   Pulse 60   Ht 5' 7\" (1.702 m)   Wt 133 lb 6.4 oz (60.5 kg)   SpO2 97%   BMI 20.89 kg/m         VisionScreening:  No exam data present     PHYSICAL EXAM  Physical Examination: General appearance - alert, well appearing, and in no distress  Mental status - alert, oriented to person, place, and time  Neck - supple, no significant adenopathy  Lymphatics - no palpable lymphadenopathy, no hepatosplenomegaly  Chest - clear to auscultation, no wheezes, rales or rhonchi, symmetric air entry  Heart - normal rate, " regular rhythm, normal S1, S2, no murmurs, rubs, clicks or gallops  Abdomen - soft, nontender, nondistended, no masses or organomegaly  Breasts - breasts appear normal, no suspicious masses, no skin or nipple changes or axillary nodes  Pelvic - normal external genitalia, vulva, vagina, cervix, uterus and adnexa are very minimal tenderness in the right adnexa and some suprapubic tenderness bladder is not enlarged  Musculoskeletal - no joint tenderness, deformity or swelling  Extremities - peripheral pulses normal, no pedal edema, no clubbing or cyanosis  Skin - normal coloration and turgor, no rashes, no suspicious skin lesions noted  Has multiple benign nevi on her back and trunk.  Has few scattered seborrheic keratosis all look nonsuspicious.    Assessment Results 9/18/2019   Activities of Daily Living No help needed   Instrumental Activities of Daily Living No help needed   Get Up and Go Score Less than 12 seconds   Mini Cog Total Score 5   Some recent data might be hidden     A Mini-Cog score of 0-2 suggests the possibility of dementia, score of 3-5 suggests no dementia    Identified Health Risks:     Patient's advanced directive was discussed and I am comfortable with the patient's wishes.

## 2021-06-02 VITALS — WEIGHT: 137.19 LBS | HEIGHT: 67 IN | BODY MASS INDEX: 21.53 KG/M2

## 2021-06-02 RX ORDER — DOXYCYCLINE 100 MG/1
CAPSULE ORAL
Qty: 6 CAPSULE | Refills: 0 | Status: SHIPPED | OUTPATIENT
Start: 2021-06-02 | End: 2021-10-22

## 2021-06-02 NOTE — TELEPHONE ENCOUNTER
Medication Request  Medication name: acyclovir (ZOVIRAX) 400 MG tablet    Pharmacy Name and Location:   [facility] # [id number]  [address]  [address]   # [phone]  Reason for request: Current Sx    When did you use medication last?:    Please advise     Patient offered appointment:  Patient is out of town , Please expedite this request      Okay to leave a detailed message: yes

## 2021-06-02 NOTE — TELEPHONE ENCOUNTER
RN cannot approve Refill Request    RN can NOT refill this medication historical medication requested.     Ivett Domingo, [facility] Triage/Med Refill 10/23/2019    Requested Prescriptions   Pending Prescriptions Disp Refills     acyclovir (ZOVIRAX) 400 MG tablet  0     Sig: Take 1 tablet (400 mg total) by mouth.       Antivirals Refill Protocol Passed - 10/23/2019 12:10 PM        Passed - Renal function done in last year     Creatinine   Date Value Ref Range Status   09/18/2019 0.93 0.60 - 1.10 mg/dL Final             Passed - Visit with PCP or prescribing provider visit in past 12 months or next 3 months     Last office visit with prescriber/PCP: Visit date not found OR same dept: Visit date not found OR same specialty: Visit date not found  Last physical: 9/18/2019 Last MTM visit: Visit date not found   Next visit within 3 mo: Visit date not found  Next physical within 3 mo: Visit date not found  Prescriber OR PCP: Lashonda José MD  Last diagnosis associated with med order: There are no diagnoses linked to this encounter.  If protocol passes may refill for 12 months if within 3 months of last provider visit (or a total of 15 months).

## 2021-06-02 NOTE — CONSULTS
[facility] Hematology and Oncology Consult Note    Patient: Esther Prince  MRN: [id number]  Date of Service: 10/08/2019      Reason for Visit:    1.  CLL    Assessment/Plan:    1.  CLL: I reviewed her records in care everywhere going back to 2011.  She had a peripheral blood flow cytometry in March of that year which showed a phenotype consistent with CLL.  The patient was unaware of this.  I reviewed this result with her and her .  We talked about the diagnosis of CLL in terms of its physiology.  We spent some time discussing the natural history of the disease.  We have a long history of 8 years of follow-up already.  She has very indolent disease without any worsening trend since 2011.  She has no anemia or thrombocytopenia.  No B symptoms or peripheral adenopathy.  Currently, there are no indications for treatment.  I reviewed the indications for treatment with the patient and her  but think that she would not need treatment for years and possibly never.  She was given some written information on CLL to take home and review.  Since she has such indolent disease, we will see her back in clinic in 1 years time.  I asked her to call me if she develops any fevers, chills, night sweats, fatigue, or any other new symptoms.  Questions were answered.    ECOG Performance   ECOG Performance Status: 0    Distress Assessment  Distress Assessment Score: 1    Problem List:    1. Lymphocytosis  Flow cytometry   2. Chronic lymphocytic leukemia (H)       Staging History:    Cancer Staging  No matching staging information was found for the patient.    History:    Esther is a 64-year-old woman who is referred today for evaluation of lymphocytosis.  This is been chronic going back to at least 2011.  At that time she had a flow cytometry done which showed phenotype consistent with CLL.  I do not think anybody followed up on that and the patient was not aware of this diagnosis previously.  Overall she is been feeling okay.   She is not having any fevers, chills, night sweats.  No abdominal pain or early satiety.  No unintentional weight loss.    Past History:    Past Medical History:   Diagnosis Date     Tick borne fever 2018    Family History   Problem Relation Age of Onset     Arthritis Mother      Dementia Mother      Diabetes type II Mother      Stroke Mother      Heart disease Father      Stroke Father      Arthritis Sister      Alcohol abuse Brother      Stroke Paternal Grandfather      Early death Paternal Grandfather      Diabetes type I Maternal Aunt      Early death Maternal Aunt      Stroke Maternal Aunt      Heart disease Paternal Aunt      Stroke Paternal Aunt      Stroke Paternal Uncle       [unfilled] Social History     Socioeconomic History     Marital status:      Spouse name: [facility]     Number of children: Not on file     Years of education: 18     Highest education level: Master's degree (e.g., MA, MS, [employer], MEd, MSW, [employer])   Occupational History     Occupation: Retired   Social Needs     Financial resource strain: Not on file     Food insecurity:     Worry: Not on file     Inability: Not on file     Transportation needs:     Medical: Not on file     Non-medical: Not on file   Tobacco Use     Smoking status: Never Smoker     Smokeless tobacco: Never Used   Substance and Sexual Activity     Alcohol use: Yes     Alcohol/week: 7.0 standard drinks     Types: 7 Glasses of wine per week     Drug use: Never     Sexual activity: Never   Lifestyle     Physical activity:     Days per week: Not on file     Minutes per session: Not on file     Stress: Not on file   Relationships     Social connections:     Talks on phone: Not on file     Gets together: Not on file     Attends Worship service: Not on file     Active member of club or organization: Not on file     Attends meetings of clubs or organizations: Not on file     Relationship status: Not on file     Intimate partner violence:     Fear of current or ex partner: Not on  file     Emotionally abused: Not on file     Physically abused: Not on file     Forced sexual activity: Not on file   Other Topics Concern     Not on file   Social History Narrative    Lives with   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 ,        Allergies:    No Known Allergies    Review of Systems:    General  Fatigue: Yes - Chronic (Greater than 3 months)  ENT  Glasses or Contacts: Yes - Chronic (Greater than 3 months)  Respiratory  Respiratory (WDL): All respiratory elements are within defined limits  Cardiovascular  Cardiovascular (WDL): All cardiovascular elements are within defined limits  Endocrine  Endocrine (WDL): All endocrine elements are within defined limits  Gastrointestinal  Abdominal Pain: Yes - Chronic (Greater than 3 months)  Musculoskeletal  Musculoskeletal (WDL): All musculoskeletal elements are within defined limits  Neurological  Headaches: Yes - Chronic (Greater than 3 months)  Psychological/Emotional  Psychological/Emotional (WDL): Assessment not pertinent to visit  Hematological/Lymphatic  Hematological/Lymphatic (WDL): All hematological/lymphatic elements are within defined limits  Dermatological  Dermatologic (WDL): Exceptions to WDL(canker sores chronic)  Genitourinary/Reproductive  Genitourinary/Reproductive (WDL): All genitourinary/reproductive elements are within defined limits  Reproductive (Females only)     Pain  Currently in Pain: Yes  Pain Score (Initial OR Reassessment): 5  Pain Frequency: Constant/continuous  Location: hip  Pain Characteristics : Aching  Pain Intervention(s): Home medication  Response to Interventions: helpful    Physical Exam:    Recent Vitals 10/8/2019   Height -   Weight 137 lbs 11 oz   BSA (m2) 1.72 m2   /69   Pulse 61   Temp 98.2   Temp src 1   SpO2 99   Some recent data might be hidden     General: patient appears stated age of 64 y.o.. Nontoxic and in no distress.   HEENT: Head: atraumatic, normocephalic. Sclerae anicteric.  Chest:   Normal respiratory effort.  Clear to auscultation bilaterally.  Cardiac:  No edema.  Regular rate and rhythm.  No murmur.  Abdomen: abdomen is non-distended.  No palpable splenomegaly.  Extremities: normal tone and muscle bulk.  Skin: no lesions or rash. Warm and dry.   CNS: alert and oriented. Grossly non-focal.   Psychiatric: normal mood and affect.   Nodes: No palpable cervical, preauricular, supraclavicular, or axillary lymphadenopathy.    Lab Results:    Results for MARJORIE RAMIREZ (MRN [id number]) as of 10/8/2019 18:24   Ref. Range 9/18/2019 12:19 9/18/2019 12:19   Sodium Latest Ref Range: 136 - 145 mmol/L 137    Potassium Latest Ref Range: 3.5 - 5.0 mmol/L 4.9    Chloride Latest Ref Range: 98 - 107 mmol/L 103    CO2 Latest Ref Range: 22 - 31 mmol/L 25    Anion Gap, Calculation Latest Ref Range: 5 - 18 mmol/L 9    BUN Latest Ref Range: 8 - 22 mg/dL 13    Creatinine Latest Ref Range: 0.60 - 1.10 mg/dL 0.93    GFR MDRD Af Amer Latest Ref Range: >60 mL/min/1.73m2 >60    GFR MDRD Non Af Amer Latest Ref Range: >60 mL/min/1.73m2 60 (L)    Calcium Latest Ref Range: 8.5 - 10.5 mg/dL 10.0    AST Latest Ref Range: 0 - 40 U/L 21    ALT Latest Ref Range: 0 - 45 U/L 20    ALBUMIN Latest Ref Range: 3.5 - 5.0 g/dL 4.1    Protein, Total Latest Ref Range: 6.0 - 8.0 g/dL 6.8    Cholesterol Latest Ref Range: <=199 mg/dL 199    Alkaline Phosphatase Latest Ref Range: 45 - 120 U/L 75    Bilirubin, Total Latest Ref Range: 0.0 - 1.0 mg/dL 0.9    Bilirubin, Direct Latest Ref Range: <=0.5 mg/dL 0.3    Triglycerides Latest Ref Range: <=149 mg/dL 54    HDL Cholesterol Latest Ref Range: >=50 mg/dL 92    LDL Calculated Latest Ref Range: <=129 mg/dL 96    Glucose Latest Ref Range: 70 - 125 mg/dL 88    WBC Latest Ref Range: 4.0 - 11.0 thou/uL 18.1 (H) 17.9 (H)   RBC Latest Ref Range: 3.80 - 5.40 mill/uL 4.57 4.50   Hemoglobin Latest Ref Range: 12.0 - 16.0 g/dL 14.0 13.5   Hematocrit Latest Ref Range: 35.0 - 47.0 % 42.5 42.0   MCV Latest Ref  Range: 80 - 100 fL 93 93   MCH Latest Ref Range: 27.0 - 34.0 pg 30.7 30.0   MCHC Latest Ref Range: 32.0 - 36.0 g/dL 33.0 32.1   RDW Latest Ref Range: 11.0 - 14.5 % 11.5 12.9   Platelets Latest Ref Range: 140 - 440 thou/uL 259 241   MPV Latest Ref Range: 8.5 - 12.5 fL 7.6 10.0   Neutrophils % Latest Ref Range: 50 - 70 %  45 (L)   Lymphocytes % Latest Ref Range: 20 - 40 %  49 (H)   Monocytes % Latest Ref Range: 2 - 10 %  6   Eosinophils % Latest Ref Range: 0 - 6 %  0   Basophils % Latest Ref Range: 0 - 2 %  0   Neutrophils Absolute Latest Ref Range: 2.0 - 7.7 thou/uL  8.0 (H)   Lymphocytes Absolute Latest Ref Range: 0.8 - 4.4 thou/uL  8.7 (H)   Monocytes Absolute Latest Ref Range: 0.0 - 0.9 thou/uL  1.0 (H)   Eosinophils Absolute Latest Ref Range: 0.0 - 0.4 thou/uL  0.1   Basophils Absolute Latest Ref Range: 0.0 - 0.2 thou/uL  0.1     Imaging Results:    Us Pelvis With Transvaginal Non Ob    Result Date: 9/18/2019  EXAM: US PELVIS WITH TRANSVAGINAL NON OB LOCATION: [facility] DATE/TIME: 9/18/2019 4:03 PM INDICATION: Pelvic and perineal pain. COMPARISON: None. TECHNIQUE: Transabdominal scans were performed. Endovaginal ultrasound was performed to better visualize the adnexa. FINDINGS: Uterus measures 5 x 3 x 4 cm. Probable fibroid at the fundus measuring 1.9 cm in maximum diameter. Endometrium is partially obscured. It measures approximately 7 mm in greatest thickness.  Neither ovary seen due to bowel gas. No significant free fluid in the cul-de-sac.     CONCLUSION: 1.  Probable uterine fibroid. 2.  Endometrium partially obscured. 3.  The ovaries are obscured by bowel gas. No adnexal mass or fluid collection.    Pathology:    Flow Cytometry Report     Patient Name: MARJORIE RAMIREZ   Accession #:   [id number]   Taken: 3/1/2011   Received: 3/3/2011   Reported: 3/3/2011                                        INTERPRETATION   Peripheral blood, immunophenotyping study:       - Consistent with chronic lymphocytic  leukemia/small lymphocytic   lymphoma (CLL / SLL).       - Kappa light chain restricted, CD5 and CD23 positive.       - Positive for CD20.       - Negative for CD38.       - (see comment and complete immunophenotyping results below).       Peripheral Smear Report     Patient Name: MARJORIE RAMIREZ   Accession #:       Taken: 3/1/2011   Received: 3/2/2011   Reported: 3/3/2011     Final Pathologic Diagnosis   Peripheral blood, morphology:       - Involved by chronic lymphocytic leukemia/small lymphocytic   lymphoma (CLL/SLL).       - Slight lymphocytosis (absolute lymphocyte count of 6500 cells   per microliter).       - No morphologic evidence of high grade lymphoma.       - Essentially unremarkable neutrophil and platelet morphology.       - No circulating blasts seen.      Signed by: Cassius Garsia MD

## 2021-06-02 NOTE — TELEPHONE ENCOUNTER
RN cannot approve Refill Request    RN can NOT refill this medication med is not covered by policy/route to provider     . Last office visit: Visit date not found Last Physical: 4/8/2019 Last MTM visit: Visit date not found Last visit same specialty: Visit date not found.  Next visit within 3 mo: Visit date not found  Next physical within 3 mo: Visit date not found      Ivett Domingo, [facility] Triage/Med Refill 10/24/2019    Requested Prescriptions   Pending Prescriptions Disp Refills     diclofenac sodium (VOLTAREN) 1 % Gel [Pharmacy Med Name: DICLOFENAC SODIUM 1% GEL] 100 g 1     Sig: APPLY 2 G TOPICALLY EVERY 6 (SIX) HOURS AS NEEDED. APPLY 2 GRAMS PER APPLICATION.       There is no refill protocol information for this order

## 2021-06-03 VITALS
SYSTOLIC BLOOD PRESSURE: 100 MMHG | OXYGEN SATURATION: 97 % | HEIGHT: 67 IN | WEIGHT: 133.4 LBS | HEART RATE: 60 BPM | DIASTOLIC BLOOD PRESSURE: 62 MMHG | BODY MASS INDEX: 20.94 KG/M2

## 2021-06-03 VITALS
OXYGEN SATURATION: 99 % | BODY MASS INDEX: 21.57 KG/M2 | HEART RATE: 61 BPM | TEMPERATURE: 98.2 F | DIASTOLIC BLOOD PRESSURE: 69 MMHG | SYSTOLIC BLOOD PRESSURE: 108 MMHG | WEIGHT: 137.7 LBS

## 2021-06-03 VITALS — BODY MASS INDEX: 21.5 KG/M2 | WEIGHT: 137 LBS | HEIGHT: 67 IN

## 2021-06-05 VITALS
HEART RATE: 68 BPM | OXYGEN SATURATION: 98 % | BODY MASS INDEX: 22.02 KG/M2 | SYSTOLIC BLOOD PRESSURE: 110 MMHG | HEIGHT: 66 IN | TEMPERATURE: 97.4 F | DIASTOLIC BLOOD PRESSURE: 66 MMHG | WEIGHT: 137 LBS

## 2021-06-05 VITALS
WEIGHT: 137 LBS | SYSTOLIC BLOOD PRESSURE: 111 MMHG | BODY MASS INDEX: 22.11 KG/M2 | DIASTOLIC BLOOD PRESSURE: 72 MMHG | OXYGEN SATURATION: 98 % | TEMPERATURE: 98.6 F | HEART RATE: 72 BPM

## 2021-06-09 ENCOUNTER — COMMUNICATION - HEALTHEAST (OUTPATIENT)
Dept: INTERNAL MEDICINE | Facility: CLINIC | Age: 66
End: 2021-06-09

## 2021-06-09 DIAGNOSIS — B00.1 HERPES LABIALIS: ICD-10-CM

## 2021-06-09 RX ORDER — ACYCLOVIR 400 MG/1
400 TABLET ORAL 2 TIMES DAILY
Qty: 60 TABLET | Refills: 3 | Status: SHIPPED | OUTPATIENT
Start: 2021-06-09 | End: 2021-10-22

## 2021-06-12 NOTE — PROGRESS NOTES
Assessment and Plan:     Patient has been advised of split billing requirements and indicates understanding: Yes  1. Need for influenza vaccination    - Influenza,Quad,High Dose,PF 65 YR+    2. Chronic lymphocytic leukemia (H)  Being monitored . White count has mya stable   - HM1(CBC and Differential)  - Lactate Dehydrogenase (LDH)  - C-Reactive Protein(CRP)    3. Health maintenance examination  Up to date urged her to do annual mammograms    4. Encounter for annual wellness visit (AWV) in Medicare patient      5. Encounter for screening mammogram for breast cancer    - Mammo Screening Bilateral; Future    6. Mixed hyperlipidemia    - Lipid [place] FASTING  - Comprehensive Metabolic Panel      She can take Tylenol PM for nighttime.  The patient's current medical problems were reviewed.      The following health maintenance schedule was reviewed with the patient and provided in printed form in the after visit summary:   Health Maintenance   Topic Date Due     Pneumococcal Vaccine: Pediatrics (0 to 5 Years) and At-Risk Patients (6 to 64 Years) (2 of 2 - PCV13) 09/18/2020     MAMMOGRAM  07/15/2021     DXA SCAN  07/15/2021     MEDICARE ANNUAL WELLNESS VISIT  10/19/2021     FALL RISK ASSESSMENT  10/19/2021     TD 18+ HE  09/08/2024     LIPID  09/18/2024     ADVANCE CARE PLANNING  09/18/2024     COLORECTAL CANCER SCREENING  01/11/2028     HEPATITIS C SCREENING  Completed     Pneumococcal Vaccine: 65+ Years  Completed     INFLUENZA VACCINE RULE BASED  Completed     ZOSTER VACCINES  Completed     HEPATITIS B VACCINES  Aged Out        Subjective:   Chief Complaint: Esther Prince is an 65 y.o. female here for an Annual Wellness visit.   HPI:  Very pleasant patient with indolent CLL no treatment no other chronic disease here to for her annual wellness visit she has been doing well.  Has some body pain at night but otherwise doing okay.  Review of Systems:    Please see above.  The rest of the review of systems are negative  for all systems.    Patient Care Team:  Lashonda José MD as PCP - General (Internal Medicine)  Lashonda José MD as Assigned PCP     Patient Active Problem List   Diagnosis     Herpes labialis     Seasonal allergic rhinitis     Health maintenance examination     Chronic lymphocytic leukemia (H)     Esophageal reflux     Past Medical History:   Diagnosis Date     Tick borne fever 2018      Past Surgical History:   Procedure Laterality Date     TOTAL HIP ARTHROPLASTY Right 2015     TUBAL LIGATION        Family History   Problem Relation Age of Onset     Arthritis Mother      Dementia Mother      Diabetes type II Mother      Stroke Mother      Heart disease Father      Stroke Father      Arthritis Sister      Alcohol abuse Brother      Stroke Paternal Grandfather      Early death Paternal Grandfather      Diabetes type I Maternal Aunt      Early death Maternal Aunt      Stroke Maternal Aunt      Heart disease Paternal Aunt      Stroke Paternal Aunt      Stroke Paternal Uncle       Social History     Socioeconomic History     Marital status:      Spouse name: [facility]     Number of children: Not on file     Years of education: 18     Highest education level: Master's degree (e.g., MA, MS, [employer], MEd, MSW, [employer])   Occupational History     Occupation: Retired   Social Needs     Financial resource strain: Not on file     Food insecurity     Worry: Not on file     Inability: Not on file     Transportation needs     Medical: Not on file     Non-medical: Not on file   Tobacco Use     Smoking status: Never Smoker     Smokeless tobacco: Never Used   Substance and Sexual Activity     Alcohol use: Yes     Alcohol/week: 7.0 standard drinks     Types: 7 Glasses of wine per week     Drug use: Never     Sexual activity: Never   Lifestyle     Physical activity     Days per week: Not on file     Minutes per session: Not on file     Stress: Not on file   Relationships     Social connections     Talks on phone: Not on file  "    Gets together: Not on file     Attends Jain service: Not on file     Active member of club or organization: Not on file     Attends meetings of clubs or organizations: Not on file     Relationship status: Not on file     Intimate partner violence     Fear of current or ex partner: Not on file     Emotionally abused: Not on file     Physically abused: Not on file     Forced sexual activity: Not on file   Other Topics Concern     Not on file   Social History Narrative    Lives with   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       acyclovir (ZOVIRAX) 400 MG tablet Take 1 tablet (400 mg total) by mouth 2 (two) times a day. 60 tablet 3     calcium carbonate-vitamin D3 600 mg calcium- 200 unit cap Take by mouth.       diclofenac sodium (VOLTAREN) 1 % Gel APPLY 2 G TOPICALLY EVERY 6 (SIX) HOURS AS NEEDED. APPLY 2 GRAMS PER APPLICATION.  1     EPINEPHrine (EPIPEN 2-DAVID) 0.3 mg/0.3 mL injection Inject 0.3 mL (0.3 mg total) into the shoulder, thigh, or buttocks as needed for anaphylaxis. 2 Pre-filled Pen Syringe 0     ferrous sulfate 325 (65 FE) MG tablet Take 1 tablet by mouth daily with breakfast.       fexofenadine (ALLEGRA ALLERGY) 180 MG tablet Take 180 mg by mouth.       fluticasone propionate (FLONASE) 50 mcg/actuation nasal spray 1 spray.       glucosam bernstein dip-chondroit-C-Mn 568-122-12-3 mg cap Take 1 capsule by mouth.       multivitamin (ONE A DAY) per tablet Take 1 tablet by mouth.       omega-3 acid ethyl esters (LOVAZA) 1 gram capsule Take 1,000 mg by mouth.       triamcinolone (KENALOG) 0.1 % paste APPLY TO TEETH 3 (THREE) TIMES A DAY FOR 5 DAYS.  1     No current facility-administered medications for this visit.       Objective:   Vital Signs:   Visit Vitals  /66   Pulse 68   Temp 97.4  F (36.3  C) (Tympanic)   Ht 5' 6\" (1.676 m)   Wt 137 lb (62.1 kg) "   SpO2 98%   BMI 22.11 kg/m           VisionScreening:  No exam data present     PHYSICAL EXAM  Physical Examination: Mouth - mucous membranes moist, pharynx normal without lesions  Neck - supple, no significant adenopathy  Lymphatics - no palpable lymphadenopathy, no hepatosplenomegaly  Chest - clear to auscultation, no wheezes, rales or rhonchi, symmetric air entry  Heart - normal rate, regular rhythm, normal S1, S2, no murmurs, rubs, clicks or gallops  Abdomen - soft, nontender, nondistended, no masses or organomegaly  Breasts - breasts appear normal, no suspicious masses, no skin or nipple changes or axillary nodes  Musculoskeletal - no joint tenderness, deformity or swelling  Extremities - peripheral pulses normal, no pedal edema, no clubbing or cyanosis  Skin - normal coloration and turgor, no rashes, no suspicious skin lesions noted  5mm SK right shoulder unchanged.    Assessment Results 10/19/2020   Activities of Daily Living No help needed   Instrumental Activities of Daily Living No help needed   Get Up and Go Score Less than 12 seconds   Mini Cog Total Score 5   Some recent data might be hidden     A Mini-Cog score of 0-2 suggests the possibility of dementia, score of 3-5 suggests no dementia        Identified Health Risks:     Patient's advanced directive was discussed and I am comfortable with the patient's wishes.

## 2021-06-12 NOTE — PROGRESS NOTES
[facility] Hematology and Oncology Progress Note    Patient: Esther Prince  MRN: [id number]  Date of Service: 10/20/2020      Assessment and Plan:    1.  CLL: Her numbers were reviewed.  She is proving to have extremely indolent disease.  Really no change in her cough since 2011.  No reciprocal cytopenias or other symptoms.  We will continue to see her yearly.  Asked her to let us know in the interim if she develops any palpable adenopathy, fatigue, or other concerning symptoms.  Questions were answered.    ECOG Performance   ECOG Performance Status: 0    Distress Assessment  Distress Assessment Score: 1    Pain  Currently in Pain: No/denies    Diagnosis:    1.  Chronic lymphocytic leukemia: Diagnosed 2011.    Treatment:    Observation    Interim History:    Esther returns today for follow-up visit.  In general she is feeling okay.  No significant changes in her health over the past year.  Denies fevers, chills, night sweats.  No unintentional weight loss.  No acute complaints today.    Review of Systems:    Constitutional  Constitutional (WDL): All constitutional elements are within defined limits  Neurosensory  Neurosensory (WDL): All neurosensory elements are within defined limits  Cardiovascular  Cardiovascular (WDL): All cardiovascular elements are within defined limits  Pulmonary  Respiratory (WDL): Within Defined Limits  Gastrointestinal  Gastrointestinal (WDL): All gastrointestinal elements are within defined limits  Genitourinary  Genitourinary (WDL): All genitourinary elements are within defined limits  Integumentary  Integumentary (WDL): All integumentary elements are within defined limits  Patient Coping  Patient Coping: Accepting  Accompanied by  Accompanied by: Alone    Past History:    Past Medical History:   Diagnosis Date     Tick borne fever 2018     Physical Exam:    Recent Vitals 10/20/2020   Height -   Weight 137 lbs   BSA (m2) 1.7 m2   /72   Pulse 72   Temp 98.6   Temp src 1   SpO2 98    Some recent data might be hidden     General: patient appears stated age of 65 y.o.. Nontoxic and in no distress.   HEENT: Head: atraumatic, normocephalic. Sclerae anicteric.  Chest:  Normal respiratory effort  Cardiac:  No edema.   Abdomen: abdomen is non-distended  Extremities: normal tone and muscle bulk.  Skin: no lesions or rash. Warm and dry.   CNS: alert and oriented. Grossly non-focal.   Psychiatric: normal mood and affect.     Lab Results:    Recent Results (from the past 168 hour(s))   Lipid [place] FASTING   Result Value Ref Range    Cholesterol 218 (H) <=199 mg/dL    Triglycerides 93 <=149 mg/dL    HDL Cholesterol 85 >=50 mg/dL    LDL Calculated 114 <=129 mg/dL    Patient Fasting > 8hrs? Unknown    Comprehensive Metabolic Panel   Result Value Ref Range    Sodium 139 136 - 145 mmol/L    Potassium 3.6 3.5 - 5.0 mmol/L    Chloride 105 98 - 107 mmol/L    CO2 25 22 - 31 mmol/L    Anion Gap, Calculation 9 5 - 18 mmol/L    Glucose 89 70 - 125 mg/dL    BUN 22 8 - 22 mg/dL    Creatinine 0.88 0.60 - 1.10 mg/dL    GFR MDRD Af Amer >60 >60 mL/min/1.73m2    GFR MDRD Non Af Amer >60 >60 mL/min/1.73m2    Bilirubin, Total 0.4 0.0 - 1.0 mg/dL    Calcium 9.5 8.5 - 10.5 mg/dL    Protein, Total 6.9 6.0 - 8.0 g/dL    Albumin 4.2 3.5 - 5.0 g/dL    Alkaline Phosphatase 74 45 - 120 U/L    AST 29 0 - 40 U/L    ALT 20 0 - 45 U/L   HM1 (CBC with Diff)   Result Value Ref Range    WBC 16.9 (H) 4.0 - 11.0 thou/uL    RBC 4.47 3.80 - 5.40 mill/uL    Hemoglobin 13.0 12.0 - 16.0 g/dL    Hematocrit 42.0 35.0 - 47.0 %    MCV 94 80 - 100 fL    MCH 29.1 27.0 - 34.0 pg    MCHC 31.0 (L) 32.0 - 36.0 g/dL    RDW 12.9 11.0 - 14.5 %    Platelets 219 140 - 440 thou/uL    MPV 10.2 8.5 - 12.5 fL   Manual Differential   Result Value Ref Range    Total Neutrophils % 36 (L) 50 - 70 %    Lymphocytes % 59 (H) 20 - 40 %    Monocytes % 3 2 - 10 %    Eosinophils %  2 0 - 6 %    Basophils % 0 0 - 2 %    Immature Granulocyte % - Manual 0 <=0 %    Total  Neutrophils Absolute 6.1 2.0 - 7.7 thou/ul    Lymphocytes Absolute 10.0 (H) 0.8 - 4.4 thou/uL    Monocytes Absolute 0.5 0.0 - 0.9 thou/uL    Eosinophils Absolute 0.3 0.0 - 0.4 thou/uL    Basophils Absolute 0.0 0.0 - 0.2 thou/uL    Immature Granulocyte Absolute - Manual 0.0 <=0.0 thou/uL    Platelet Estimate Normal Normal    Ovalocytes 1+ (!) Negative    Smudge Cells Present (!) None Seen     Imaging:    No results found.      Signed by: Cassius Garsia MD

## 2021-06-12 NOTE — PATIENT INSTRUCTIONS - HE
Advance Directive  Patient s advance directive was discussed and I am comfortable with the patient s wishes.  Patient Education   Personalized Prevention Plan  You are due for the preventive services outlined below.  Your care team is available to assist you in scheduling these services.  If you have already completed any of these items, please share that information with your care team to update in your medical record.  Health Maintenance   Topic Date Due     Pneumococcal Vaccine: Pediatrics (0 to 5 Years) and At-Risk Patients (6 to 64 Years) (2 of 2 - PCV13) 09/18/2020     MAMMOGRAM  07/15/2021     DXA SCAN  07/15/2021     MEDICARE ANNUAL WELLNESS VISIT  10/19/2021     FALL RISK ASSESSMENT  10/19/2021     TD 18+ HE  09/08/2024     LIPID  09/18/2024     ADVANCE CARE PLANNING  09/18/2024     COLORECTAL CANCER SCREENING  01/11/2028     HEPATITIS C SCREENING  Completed     Pneumococcal Vaccine: 65+ Years  Completed     INFLUENZA VACCINE RULE BASED  Completed     ZOSTER VACCINES  Completed     HEPATITIS B VACCINES  Aged Out

## 2021-06-14 NOTE — TELEPHONE ENCOUNTER
Refill Approved    Rx renewed per Medication Renewal Policy. Medication was last renewed on 4/8/19.    Ivett Domingo, [facility] Triage/Med Refill 1/27/2021     Requested Prescriptions   Pending Prescriptions Disp Refills     EPINEPHrine (EPIPEN 2-DAVID) 0.3 mg/0.3 mL injection 2 Pre-filled Pen Syringe 0     Sig: Inject 0.3 mL (0.3 mg total) into the shoulder, thigh, or buttocks as needed for anaphylaxis.       Epinephrine (Bee Sting) Kit Refill Protocol Passed - 1/26/2021  2:50 PM        Passed - Patient to get 0.3mg dose (adult kit)          Passed - Patient has had office visit/physical in last 1 year     Last office visit with prescriber/PCP: Visit date not found OR same dept: Visit date not found OR same specialty: Visit date not found  Last physical: 10/19/2020 Last MTM visit: Visit date not found   Next visit within 3 mo: Visit date not found  Next physical within 3 mo: Visit date not found  Prescriber OR PCP: Lashonda José MD  Last diagnosis associated with med order: 1. History of anaphylaxis  - EPINEPHrine (EPIPEN 2-DAVID) 0.3 mg/0.3 mL injection; Inject 0.3 mL (0.3 mg total) into the shoulder, thigh, or buttocks as needed for anaphylaxis.  Dispense: 2 Pre-filled Pen Syringe; Refill: 0    If protocol passes may refill for 12 months if within 3 months of last provider visit (or a total of 15 months).

## 2021-06-14 NOTE — TELEPHONE ENCOUNTER
Requested Prescriptions     Pending Prescriptions Disp Refills     EPINEPHrine (EPIPEN 2-DAVID) 0.3 mg/0.3 mL injection 2 Pre-filled Pen Syringe 0     Sig: Inject 0.3 mL (0.3 mg total) into the shoulder, thigh, or buttocks as needed for anaphylaxis.

## 2021-06-16 PROBLEM — C91.10 CHRONIC LYMPHOCYTIC LEUKEMIA (H): Status: ACTIVE | Noted: 2019-10-08

## 2021-06-16 PROBLEM — Z00.00 HEALTH MAINTENANCE EXAMINATION: Status: ACTIVE | Noted: 2019-09-18

## 2021-06-16 NOTE — PROGRESS NOTES
Esther Prince is a 66 y.o. female who is being evaluated via a billable video visit.      How would you like to obtain your AVS? MyChart.  If dropped from the video visit, the video invitation should be resent by: Text to cell phone: [phone]  Will anyone else be joining your video visit? No    Video Start Time: 7 am         Subjective   Esther Prince is 66 y.o. and presents today for the following health issues   HPI   A year ago fell on right hip while playing pickle ball , took a few weeks to recover from that event but was fine.  And was not seen for that.  Now she is getting pain down left leg, she says she walks a lot has been walking like to [place] from her home which is about 3 miles.  She reports no injury.  If she walks a little bit within the house the pain is not so bad and During day manageable , but at night gets really painful and she struggles to sleep.  It also starts really hurting if she walks more than a mile.  Pain. Start in buttock , and goes all the way down to ankle . Takes 3 ibuprofen and that does have mitigated somewhat.  She has no other joint pains anywhere else in the shoulder or hip area.  She has no weightbearing pain as such.  Climbing up stairs does not seem to aggravate it any more than walking at length.  She does not report any real tenderness in her hip joint anywhere.  She does not report specific low back pain either.  She has no burning or numbness or foot drop or balance problems or bladder problems.  She has no prior history of chronic back pain.  Chronic left hip pain.  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            Review of Systems        Objective       Vitals:  No vitals were obtained today due to virtual visit.    Physical Exam      And plan  1. Pain of left lower leg  The pain at rest, the throbbing nature of the pain in the originating in the buttock seems to point to a diagnosis of trochanteric bursitis versus referred pain from the lower back  in the form of sciatica.  It could also be piriformis syndrome.  SI joint pain.  Less likely to be hip osteoarthritis.  Recommend physical therapy evaluation.  She did not declines taking any other kind of pain killers like a long-acting NSAID.  If she does not improve within a week then we should consider MRI scan.  I do not think x-rays will be useful given that she had no injury.    - triamcinolone (KENALOG) 0.1 % paste; APPLY TO TEETH 3 (THREE) TIMES A DAY FOR 5 DAYS.  Dispense: 5 g; Refill: 1  - Ambulatory referral to Adult PT- Internal            Video-Visit Details    Type of service:  Video Visit    Video End Time (time video stopped): 718 am   Originating Location (pt. Location): Home    Distant Location (provider location):  [facility]     Platform used for Video Visit: [employer]

## 2021-06-16 NOTE — PROGRESS NOTES
[facility] Daily Progress     Patient Name: Esther Prince  Date: 4/23/2021  Visit #: 4/4-8  PTA visit #:    Referral Diagnosis: Pain of left lower leg   Referring provider: Lashonda José MD  Visit Diagnosis:     ICD-10-CM    1. Radicular pain of left lower extremity  M54.10    2. Left hip pain  M25.552    3. Decreased range of motion of left lower extremity  M25.662      R THR 5 years ago     Assessment:   From Evaluation: Esther is a 66 year old female that has been having pain from her left buttock and radiating distally along lateral left thigh, left knee and occasionally to her left lower leg.  She denies parasthesia.  She is very active and reports that she had walked 2.5 miles from her house around [facility] and since then has had a lot of left buttock/hip pain and the worst is when it is keeping her up at night.  She will take 4-5 ibuprofen and walk around at night when the pain is that intense.  If she walks a little bit within the house the pain is not so bad and During day manageable , but at night gets really painful and she struggles to sleep.  It also starts really hurting if she walks more than a mile. She has not stopped her exercises and is very active daily including using the [place], long hikes at [place], [employer] ski in the winter and will begin road biking and paddling as the weather warms up.  She hopes to be biking 60-70 miles soon and will start with 10-12 miles early on.      HEP/POC compliance is  good .  Patient demonstrates understanding/independence with home program.  Patient is benefitting from skilled physical therapy and is making steady progress toward functional goals.     Pt reports significant improvement in pain after last session - CounterStrain performed. Able to sleep through the night for 2 consecutive nights.     Goal Status:  Pt. will demonstrate/verbalize independence in self-management of condition in : 12 weeks  Pt. will be independent with home exercise  program in : 12 weeks  Pt. will report decreased intensity, frequency of : Pain;in 12 weeks;Comment  Comment:: of left buttock and radiating left lateral leg pain to knee/calf  Pt. will have improved quality of sleep: with less pain;waking less times/night;in 12 weeks;Comment  Comment:: wake less than 2x/night  Patient will return to: exercise;for 1 hour;in 12 weeks;Comment  Comment: able to do all exercises desired of hiking, elliptical, road biking, paddling without increased pain at night    Patient will increase : LEFS score;in 12 weeks;by _ points  by ___ points: 4      Plan / Patient Education:     Continue with initial plan of care.  Progress with home program as tolerated.   Plan for next session:Counterstrain.     Subjective:   Pain free all day after last session with Counterstrain.  Did wake up from pain that night. Last night some discomfort but did not wake her up.     Pain started in 2019 after a fall on hips.  Pain resolved but returned recently but not as severe.     Objective:   Release of epidurals and artery in lumbar spine.     Counterstrain Epidural scan (lumbar)  EPIL4 on R   EPIL3 on L   EPIL2 on R     Arterial Scan:   EIL- A (Psoas) on R     Treatment Today   TREATMENT MINUTES COMMENTS   Evaluation     Self-care/ Home management     Manual therapy 30 See above     Neuromuscular Re-education     Therapeutic Activity     Therapeutic Exercises 0 See flow sheet    Gait training     Modality__________________                Total 30    Blank areas are intentional and mean the treatment did not include these items.       Natalia Hunter, PT, DPT  4/23/2021

## 2021-06-16 NOTE — PROGRESS NOTES
[facility] Daily Progress     Patient Name: Esther Prince  Date: 4/14/2021  Visit #: 2/4-8  PTA visit #:    Referral Diagnosis: Pain of left lower leg   Referring provider: Lashonda José MD  Visit Diagnosis:     ICD-10-CM    1. Radicular pain of left lower extremity  M54.10    2. Left hip pain  M25.552    3. Decreased range of motion of left lower extremity  M25.662      R THR 5 years ago     Assessment:   From Evaluation: Esther is a 66 year old female that has been having pain from her left buttock and radiating distally along lateral left thigh, left knee and occasionally to her left lower leg.  She denies parasthesia.  She is very active and reports that she had walked 2.5 miles from her house around [facility] and since then has had a lot of left buttock/hip pain and the worst is when it is keeping her up at night.  She will take 4-5 ibuprofen and walk around at night when the pain is that intense.  If she walks a little bit within the house the pain is not so bad and During day manageable , but at night gets really painful and she struggles to sleep.  It also starts really hurting if she walks more than a mile. She has not stopped her exercises and is very active daily including using the [employer], long hikes at [place], [employer] ski in the winter and will begin road biking and paddling as the weather warms up.  She hopes to be biking 60-70 miles soon and will start with 10-12 miles early on.      HEP/POC compliance is  good .  Patient demonstrates understanding/independence with home program.  Patient is benefitting from skilled physical therapy and is making steady progress toward functional goals.   No change in symptoms since last session. Symptoms consistent with piriformis syndrome.     Goal Status:  Pt. will demonstrate/verbalize independence in self-management of condition in : 12 weeks  Pt. will be independent with home exercise program in : 12 weeks  Pt. will report decreased intensity,  frequency of : Pain;in 12 weeks;Comment  Comment:: of left buttock and radiating left lateral leg pain to knee/calf  Pt. will have improved quality of sleep: with less pain;waking less times/night;in 12 weeks;Comment  Comment:: wake less than 2x/night  Patient will return to: exercise;for 1 hour;in 12 weeks;Comment  Comment: able to do all exercises desired of hiking, elliptical, road biking, paddling without increased pain at night    Patient will increase : LEFS score;in 12 weeks;by _ points  by ___ points: 4      Plan / Patient Education:     Continue with initial plan of care.  Progress with home program as tolerated.   Plan for next session: ask about use of tennis ball and ice.  Relief with stretching and use of ball at night when in more pain?  STM to L piriformis.     Subjective:   Burning sensation starts in piriformis area and travels down lateral leg into lateral knee - at times travels into lateral lower leg.     Pain Rating: 3   Pain in the middle of night 8-9/10.  Worst at night when laying in bed for a while.   Biking does not bother her too much.   Pain after walking 2.5 miles.   Sitting >15-20 min has to move around     Pain started in 2019 after a fall on hips.  Pain resolved but returned recently but not as severe.     Objective:   Slump test: negative  SLR test: negative     Stretching in the evening   Recommended ice before going to bed 20 min   Trial tennis ball and stretching in the middle of the night to relieve pain.  Pt reports she is working with a  and does not want hip strengthening ex's as part of HEP. Trialed bridging today.  Pt demonstrated hip adduction in hooklying with ball - this is something that has helped in the past with prior piriformis pain.     Exercises:  Exercise #1: supine piriformis stretch (left ankle over right knee) can add pull of left knee to opposite shoulder 20-30 seconds 2x; 2x/day - recommended Right foot on wall or pull right leg into chest.   Can also push L knee away for increased stretch  Comment #1: clam shell for left hip 5-10x daily - added S/L hip abdcution 2-3 sets 10-15 reps  Exercise #2: add pigeon  Comment #2: add prone hip extension  Exercise #3: add bridging  Comment #3: add seated hamstring stretch  Tennis ball:  Against wall in pillow case - massage piriformis   Foam roller: massage piriformis - can place leg in figure-4 position. Trialed in clinic - pt reports it is not as helpful as tennis ball but advised pt to experiment at home.     Treatment Today   TREATMENT MINUTES COMMENTS   Evaluation     Self-care/ Home management     Manual therapy 8  S/L STM to L piriformis    Neuromuscular Re-education     Therapeutic Activity     Therapeutic Exercises 20 See flow sheet    Gait training     Modality__________________                Total 28    Blank areas are intentional and mean the treatment did not include these items.       Natalia Hunter, PT, DPT  4/14/2021
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
## 2021-06-18 NOTE — PATIENT INSTRUCTIONS - HE
Patient Instructions by Telma Ignacio PT at 4/9/2021 11:30 AM     Author: Telma Ignacio PT Service: -- Author Type: Physical Therapist    Filed: 4/9/2021 12:20 PM Encounter Date: 4/9/2021 Status: Signed    : Telma Ignacio PT (Physical Therapist)        PIRIFORMIS STRETCH    While lying on your back with both knee bent, cross your affected leg on the other knee.     20-30 seconds  2x  2x/day    If able to pull left knee (affected side) towards your opposite chest        CLAM SHELLS    While lying on your right side with your knees bent, draw up the top (left) knee while keeping contact of your feet together.    Do not let your pelvis roll back during the lifting movement.      5-10x  daily     Cold packs after workouts 10-20 minutes  At the end of the day before bed also    Keep your eye your exercises- listen to your body  Modify if painful          Skin Substitute Paste Text: The defect edges were debeveled with a #15 scalpel blade.  Given the location of the defect, shape of the defect and the proximity to free margins a skin substitute micronized graft was deemed most appropriate.  The entire vial contents were admixed with 0.5ccs of sterile saline, formed into a paste and then evenly spread over the entire wound bed.

## 2021-06-18 NOTE — PATIENT INSTRUCTIONS - HE
Patient Instructions by Natalia Hunter PT at 4/14/2021  7:00 AM     Author: Natalia Hunter PT Service: -- Author Type: Physical Therapist    Filed: 4/14/2021  7:23 AM Encounter Date: 4/14/2021 Status: Signed    : Natalia Hunter PT (Physical Therapist)          HIP ABDUCTION - SIDELYING    While lying on your side, slowly raise up your top leg to the side. Keep your knee straight and maintain your toes pointed forward the entire time.     The bottom leg can be bent to stabilize your body.

## 2021-06-19 NOTE — LETTER
Letter by Maggi Truong RN at      Author: Maggi Truong RN Service: -- Author Type: --    Filed:  Encounter Date: 6/7/2019 Status: (Other)       [facility]  [address] [address]      Estherjames Prince  [address] [place] [address]    Dear Esther    We have reviewed the Health Care Directive dated 2-25-19 which you presented for addition to   your medical record. Unfortunately, our review indicates the document is not legally valid for   the following reason(s): The last page of the doucment appears to by your spouse's as the notary names him not you..  In order to create a legal document you will need to resend your entire Health Care Directive with your last page.    We greatly value the opportunity to assist you in documenting your choices and to honor your   wishes. We apologize for any inconvenience. We have several options to assist you in updating   your document so it meets legal requirements.       Health Care Directives and Advance Care Planning resources can be viewed and printed   for free at our web site:[email or web address].       COPIES of completed Health Care Directives can be brought or mailed to any of our   locations, including the address listed below. You can also email a copy to [email or web address] .       For additional assistance or questions you can email us at [email or web address] or call [phone]    Sincerely,     [facility]  [address] [address]   Email us: [email or web address] Call us: [phone]  Visit at: [email or web address]

## 2021-06-19 NOTE — LETTER
Letter by Jena Bryan RN at      Author: Jena Bryan RN Service: -- Author Type: --    Filed:  Encounter Date: 4/26/2019 Status: (Other)       [facility]  [facility]  [address] [place] [address]        Esther MORAES Prince  [address] [facility] [address]  [place] [address]    Dear Ms. Suresh,     We have reviewed the Health Care Directive dated 2/25/2019 which you presented for addition to your medical record. Unfortunately, our review indicates the document is not legally valid for   the following reason: Your signature was not witnessed or notarized.     In order to create a legal document you will need to have your signature witnessed by 2 people or notarized. Notaries and witnesses cannot be named as your health care agents per state law. In addition, only one of your witnesses can be employed by our health care system.    We greatly value the opportunity to assist you in documenting your choices and to honor your   wishes. We apologize for any inconvenience. We have several options to assist you in updating   your document so it meets legal requirements.       Health Care Directives and Advance Care Planning resources can be viewed and printed  for free at our web site:[email or web address].     Free group classes on Advance Care Planning and completing a Health Care Directive are  available at multiple locations and times. These classes are led by trained staff who will   provide information and guide you through a Health Care Directive. They can also review, notarize and add your Health Care Directive to your medical record. [place] for a class at [email or web address] or by calling [employer] at [phone] or toll free [phone].    COPIES of completed Health Care Directives can be brought or mailed to any of our   locations, including the address listed below. You can also email a copy to  [email or web address]     For additional assistance or questions you can email us at [email or web address] or call [phone]    Sincerely,     [facility]  [facility], [facility] and [address] [address]   Email us: [email or web address] Call us: [phone]  Visit at: [email or web address]

## 2021-06-25 NOTE — PROGRESS NOTES
[facility] Daily Progress     Patient Name: Esther Prince  Date: 5/27/2021  Visit #: 6/4-8  PTA visit #:    Referral Diagnosis: Pain of left lower leg   Referring provider: Lashonda José MD  Visit Diagnosis:     ICD-10-CM    1. Radicular pain of left lower extremity  M54.10    2. Left hip pain  M25.552    3. Decreased range of motion of left lower extremity  M25.662      R THR 5 years ago     Assessment:   From Evaluation: Esther is a 66 year old female that has been having pain from her left buttock and radiating distally along lateral left thigh, left knee and occasionally to her left lower leg.  She denies parasthesia.  She is very active and reports that she had walked 2.5 miles from her house around [facility] and since then has had a lot of left buttock/hip pain and the worst is when it is keeping her up at night.  She will take 4-5 ibuprofen and walk around at night when the pain is that intense.  If she walks a little bit within the house the pain is not so bad and During day manageable , but at night gets really painful and she struggles to sleep.  It also starts really hurting if she walks more than a mile. She has not stopped her exercises and is very active daily including using the [employer], long hikes at [employer], [employer] ski in the winter and will begin road biking and paddling as the weather warms up.  She hopes to be biking 60-70 miles soon and will start with 10-12 miles early on.      HEP/POC compliance is  good .  Patient demonstrates understanding/independence with home program.  Patient is benefitting from skilled physical therapy and is making steady progress toward functional goals.     Pain did return somewhat after fall on L hip.  Pain relief post session- no longer having L LE pain.     Goal Status:  Pt. will demonstrate/verbalize independence in self-management of condition in : 12 weeks  Pt. will be independent with home exercise program in : 12 weeks  Pt. will report decreased  intensity, frequency of : Pain;in 12 weeks;Comment  Comment:: of left buttock and radiating left lateral leg pain to knee/calf  Pt. will have improved quality of sleep: with less pain;waking less times/night;in 12 weeks;Comment  Comment:: wake less than 2x/night  Patient will return to: exercise;for 1 hour;in 12 weeks;Comment  Comment: able to do all exercises desired of hiking, elliptical, road biking, paddling without increased pain at night  Patient will increase : LEFS score;in 12 weeks;by _ points  by ___ points: 4      Plan / Patient Education:     Continue with initial plan of care.  Progress with home program as tolerated.   Plan for next session:Counterstrain. Pt will return within 30 days as needed.     Subjective:   Pt was playing Pickleball and fell on left hip.   Uncomfortable at night. 5/10   7/10 pain at night   Not as severe as it was.   Mild pain into latera knee.   Is going to back off clam shells.     Objective:   Exercises:  Exercise #1: supine piriformis stretch (left ankle over right knee) can add pull of left knee to opposite shoulder 20-30 seconds 2x; 2x/day - recommended Right foot on wall or pull right leg into chest.  Can also push L knee away for increased stretch  Comment #1: clam shell for left hip 5-10x daily - added S/L hip abdcution 2-3 sets 10-15 reps  Exercise #2: pigeon on left side only  Comment #2: add prone hip extension  Exercise #3: add bridging  Comment #3: add seated hamstring stretch  Exercise #4: psoas stretch: in supine with leg off the table - contralateral knee to chest  Comment #4: demonstrated standing and kneeling lunge  *psoas stretch only - not given as handout.     Counterstrain:    Epidural vein  - EPIL4 on L   PLL L5 on R and L4 on L   EIL-A    Dull ache that travels down her L leg into knee is gone post-session     Treatment Today   TREATMENT MINUTES COMMENTS   Evaluation     Self-care/ Home management     Manual therapy 23 See above     Neuromuscular  Re-education     Therapeutic Activity     Therapeutic Exercises 5 See flow sheet    Gait training     Modality__________________                Total 28    Blank areas are intentional and mean the treatment did not include these items.       Natalia Hunter, PT, DPT  5/27/2021     [facility] Discharge Summary  Patient Name: Esther Prince  Date: 6/24/2021  Referral Diagnosis: left hip pain   Referring provider: Lashonda José MD  Visit Diagnosis:   1. Radicular pain of left lower extremity     2. Left hip pain     3. Decreased range of motion of left lower extremity         Goal Status:  Pt. will demonstrate/verbalize independence in self-management of condition in : 12 weeks  Pt. will be independent with home exercise program in : 12 weeks MET  Pt. will report decreased intensity, frequency of : Pain;in 12 weeks;Comment  Comment:: of left buttock and radiating left lateral leg pain to knee/calf  Pt. will have improved quality of sleep: with less pain;waking less times/night;in 12 weeks;Comment Progressing - has improved   Comment:: wake less than 2x/night  Patient will return to: exercise;for 1 hour;in 12 weeks;Comment  Comment: able to do all exercises desired of hiking, elliptical, road biking, paddling without increased pain at night Progressing   Patient will increase : LEFS score;in 12 weeks;by _ points  by ___ points: 4    Patient was seen for 6 visits from 4/9/21 to 5/27/21 With 0 missed appointments.  The patient met goals and has demonstrated understanding of and independence in the home program for self-care, and progression to next steps.  She will initiate contact if questions or concerns arise.    Therapy will be discontinued at this time.  The patient will need a new referral to resume.    Thank you for your referral.  Natalia Hunter  6/24/2021  10:08 AM

## 2021-06-30 NOTE — PROGRESS NOTES
Progress Notes by Telma Ignacio PT at 4/9/2021 11:30 AM     Author: Telma Ignacio PT Service: -- Author Type: Physical Therapist    Filed: 4/9/2021  1:56 PM Encounter Date: 4/9/2021 Status: Attested    : Telma Ignacio PT (Physical Therapist) Cosigner: Lashonda José MD at 4/9/2021  4:04 PM    Attestation signed by Lashonda José MD at 4/9/2021  4:04 PM    Agree with plan                [facility]   Hip Initial Evaluation    Patient Name: Esther Prince  Date of evaluation: 4/9/2021  Referral Diagnosis: Pain of left lower leg  Referring provider: Lashonda José MD  Visit Diagnosis:     ICD-10-CM    1. Radicular pain of left lower extremity  M54.10    2. Left hip pain  M25.552    3. Decreased range of motion of left lower extremity  M25.662        Assessment:      Esther is a 66 year old female that has been having pain from her left buttock and radiating distally along lateral left thigh, left knee and occasionally to her left lower leg.  She denies parasthesia.  She is very active and reports that she had walked 2.5 miles from her house around [facility] and since then has had a lot of left buttock/hip pain and the worst is when it is keeping her up at night.  She will take 4-5 ibuprofen and walk around at night when the pain is that intense.  If she walks a little bit within the house the pain is not so bad and During day manageable , but at night gets really painful and she struggles to sleep.  It also starts really hurting if she walks more than a mile. She has not stopped her exercises and is very active daily including using the [employer], long hikes at [employer], [employer] ski in the winter and will begin road biking and paddling as the weather warms up.  She hopes to be biking 60-70 miles soon and will start with 10-12 miles early on.      A year ago fell on right hip while playing pickle ball , took a few weeks to recover from that but was fine.  She did not need medical  intervention for that.  Does have a history of having a THR on right about 5 years ago.  Had x-ray at 5 year follow up with [facility]  Both hips look good including no OA at left hip as of December, 2020    Symptoms consistent with left hip external rotators/piriformis muscle strain or inflammation of tendons.    Skilled PT is required to determine course of treatment  Pt. is appropriate for skilled PT intervention as outlined in the Plan of Care (POC).  Pt. is a good candidate for skilled PT services to improve pain levels and function.    Goals:  Pt. will demonstrate/verbalize independence in self-management of condition in : 12 weeks  Pt. will be independent with home exercise program in : 12 weeks  Pt. will report decreased intensity, frequency of : Pain;in 12 weeks;Comment  Comment:: of left buttock and radiating left lateral leg pain to knee/calf  Pt. will have improved quality of sleep: with less pain;waking less times/night;in 12 weeks;Comment  Comment:: wake less than 2x/night  Patient will return to: exercise;for 1 hour;in 12 weeks;Comment  Comment: able to do all exercises desired of hiking, elliptical, road biking, paddling without increased pain at night    Patient will increase : LEFS score;in 12 weeks;by _ points  by ___ points: 4      Patient's expectations/goals are realistic.    Barriers to Learning or Achieving Goals:  Chronicity of the problem.  Co-morbidities or other medical factors.  right THR 5 years ago       Plan / Patient Instructions:        Plan of Care:   Authorization / Certification Start Date: 04/09/21  Authorization / Certification End Date: 07/08/21  Communication with: Referral Source  Patient Related Instruction: Nature of Condition;Treatment plan and rationale;Self Care instruction;Basis of treatment;Body mechanics;Posture;Precautions;Next steps;Expected outcome  Times per Week: 1-2  Number of Weeks: 3-6  Number of Visits: 4-8  Precautions / Restrictions : right THR 5 years  ago  Therapeutic Exercise: ROM;Stretching;Strengthening  Neuromuscular Reeducation: kinesio tape;posture;core  Manual Therapy: soft tissue mobilization  Modalities: ultrasound;cold pack;electrical stimulation      Plan for next visit: review piriformis stretch and clam shell  Add ex per flow sheet  US to left piriformis/hip external rotators  Patient to continue using ice packs 1-2x/day  Consider K-tape  Consider MT/STM to left piriformis and hip external rotators in left sidelying    1-2x/week     Subjective:        Social information:   Living Situation:single family home and lives with others    Occupation:retired phy- and counselor   Work Status:NA   Equipment Available: None     History of present illness:  Esther is a 66 year old female that has been having pain from her left buttock and radiating distally along lateral left thigh, left knee and occasionally to her left lower leg.  She denies parasthesia.  She is very active and reports that she had walked 2.5 miles from her house around [facility] and since then has had a lot of left buttock/hip pain and the worst is when it is keeping her up at night.  She will take 4-5 ibuprofen and walk around at night when the pain is that intense.  If she walks a little bit within the house the pain is not so bad and During day manageable , but at night gets really painful and she struggles to sleep.  It also starts really hurting if she walks more than a mile. She has not stopped her exercises and is very active daily including using the [employer], long hikes at [employer], [employer] ski in the winter and will begin road biking and paddling as the weather warms up.  She hopes to be biking 60-70 miles soon and will start with 10-12 miles early on.      A year ago fell on right hip while playing pickle ball , took a few weeks to recover from that but was fine.  She did not need medical intervention for that.  Does have a history of having a THR on right about 5 years ago.  Had  x-ray at 5 year follow up with [facility]  Both hips look good including no OA at left hip as of December, 2020      Pain Rating:3  Pain rating at best: 1  Pain rating at worst: 9  Pain description: burning, pain and shooting; no numbness or tingling    Unmanageable at night; any position can be painful; laying on back is best;  Get up and walk and loosen it up  4-5 ibuprofen to help  Ice packs   had for a year off and on; long walks on concrete 2.5 hours long to [place] and back; very sore in Feb. 2021, sore since then  Fell playing [employer] august 2019, did a volleyball roll  Played volleyball until had hip replaced  At [facility] - still doing exercises  Not walking on concrete  [place]grass can walk a long time  Sitting over an hour  Change position helps    Functional limitations are described as occurring with:   sitting for an hour  sleeping  sports or recreation very active and affects all exercise  transitional movements rolling in bed  walking x 1 mile    Patient reports benefit from:  movement or exercise , anti-inflammatory       Objective:      Note: Items left blank indicates the item was not performed or not indicated at the time of the evaluation.    Patient Outcome Measures :    Lower Extremity Functional Scale (_/80): 56     Scores range from 0-80, where a score of 80 represents maximum function. The minimal clinically important difference is a positive change of 9 points.    Hip Examination  1. Radicular pain of left lower extremity     2. Left hip pain     3. Decreased range of motion of left lower extremity       Involved Side: Left  Posture Observation:      General standing posture is normal.  Assistive Device: None  Gait Observation: good gait, very fit and good posture and stride length and speed  Lumbar Clearing: Does not provoke symptoms  Right leg longer than left in supine    Hip ROM:    Date: 4/9/21     Hip ROM( ) AROM in degrees AROM in degrees AROM in degrees    Right Left Right Left  Right Left   Hip Flexion (0-120 )         Hip Abduction (0-45 )         Hip External Rotation (0-50 )  30       Hip Internal Rotation (0-40 )         Hip Extension (0-15 )          PROM in degrees PROM in degrees PROM in degrees    Right Left Right Left Right Left   Hip Flexion (0-120 )         Hip Abduction (0-45 )         Hip External Rotation (0-50 )         Hip Internal Rotation (0-40 )         Hip Extension (0-15 )           Hip/Knee Strength  All 5/5 strength  Date: 4/9/21     Hip/Knee Strength (/5) MMT MMT MMT    Right Left Right Left Right Left   Hip Flexion         Hip Abduction         Hip Adduction         Hip Extension         Hip External Rotation         Hip Internal Rotation         Knee Extension         Knee Flexion           Flexibility:  Palpation: very tender to palpation of left piriformis and musculature near left sciatic notch    Hip Special Tests     OA Right (+/-) Left (+/-) Intra Articular Right (+/-) Left (+/-)   Hip Scour  - ELI  -   Test Cluster  -Hip pain  -Hip IR <15   -Hip Flex <115    FADIR     Test Cluster  -Painful Hip IR  ->50 years old  -Morning Stiffness <60 min   Passive Supine Rotation Test  + for ER   Misc. Right (+/-) Left (+/-) Stinchfield Test (SLR Against Resistance)     Elys   DEXRIT     Obers   DIRI     Trendelenburg   Posterior Rim Impingement     SIJ Right (+/-) Left (+/-) Lateral Rim Impingement     SIJ Compression   Other     SIJ Distraction   Other     POSH Test   Other     Sacral Thrust   Other       Exercises:  Exercise #1: supine piriformis stretch (left ankle over right knee) can add pull of left knee to opposite shoulder 20-30 seconds 2x; 2x/day  Comment #1: clam shell for left hip 5-10x daily  Exercise #2: add piriformis stretch in standing with left lower leg on plinth  Comment #2: add prone hip extension  Exercise #3: add bridging  Comment #3: add seated hamstring stretch      Treatment Today     TREATMENT MINUTES COMMENTS   Evaluation 30 Left hip  evaluation   Self-care/ Home management 2 Discussed modifying exercises as needed and to shorten stride length and slow speed to put less stress on left hip with walking   And instructed in use of cold packs 1-2x/day   Manual therapy 2 Very tender at left piriformis near sciatic notch   Neuromuscular Re-education     Therapeutic Activity     Therapeutic Exercises 15 Ex per flow sheet above;   Gait training     Modality__________________ 10 US 1Mz 100% 1.2 w/cm2 x 8' in right sidelying with pillow between knees to left piriformis/hip external rotators              Total 59    Blank areas are intentional and mean the treatment did not include these items.     PT Evaluation Code: (Please list factors)  Patient History/Comorbidities: previous right THR 5 years ago  Examination: decreased left hip ER; tenderness to left buttock/left hip ER muscles  Clinical Presentation: stable  Clinical Decision Making: low    Patient History/  Comorbidities Examination  (body structures and functions, activity limitations, and/or participation restrictions) Clinical Presentation Clinical Decision Making (Complexity)   No documented Comorbidities or personal factors 1-2 Elements Stable and/or uncomplicated Low   1-2 documented comorbidities or personal factor 3 Elements Evolving clinical presentation with changing characteristics Moderate   3-4 documented comorbidities or personal factors 4 or more Unstable and unpredictable High                Telma Ignacio, PT  4/9/2021  11:34 AM

## 2021-07-03 NOTE — ADDENDUM NOTE
Addendum Note by Krupa Mccallum MLT at 10/19/2020  2:20 PM     Author: Krupa Mccallum MLT Service: -- Author Type:     Filed: 10/19/2020  4:55 PM Encounter Date: 10/19/2020 Status: Signed    : Krupa Mccallum MLT ()    Addended by: KRUPA MCCALLUM on: 10/19/2020 04:55 PM        Modules accepted: Orders

## 2021-08-31 ENCOUNTER — MYC MEDICAL ADVICE (OUTPATIENT)
Dept: INTERNAL MEDICINE | Facility: CLINIC | Age: 66
End: 2021-08-31

## 2021-08-31 DIAGNOSIS — Z20.822 ENCOUNTER FOR LABORATORY TESTING FOR COVID-19 VIRUS: Primary | ICD-10-CM

## 2021-09-01 NOTE — TELEPHONE ENCOUNTER
MD please review [employer] message and advise. Future order test pended in encounter if agreeable, thank you.

## 2021-09-07 ENCOUNTER — LAB (OUTPATIENT)
Dept: LAB | Facility: CLINIC | Age: 66
End: 2021-09-07
Payer: COMMERCIAL

## 2021-09-07 DIAGNOSIS — Z20.822 ENCOUNTER FOR LABORATORY TESTING FOR COVID-19 VIRUS: ICD-10-CM

## 2021-09-07 PROCEDURE — U0005 INFEC AGEN DETEC AMPLI PROBE: HCPCS

## 2021-09-07 PROCEDURE — U0003 INFECTIOUS AGENT DETECTION BY NUCLEIC ACID (DNA OR RNA); SEVERE ACUTE RESPIRATORY SYNDROME CORONAVIRUS 2 (SARS-COV-2) (CORONAVIRUS DISEASE [COVID-19]), AMPLIFIED PROBE TECHNIQUE, MAKING USE OF HIGH THROUGHPUT TECHNOLOGIES AS DESCRIBED BY CMS-2020-01-R: HCPCS

## 2021-09-08 LAB — SARS-COV-2 RNA RESP QL NAA+PROBE: NEGATIVE

## 2021-10-07 ENCOUNTER — VIRTUAL VISIT (OUTPATIENT)
Dept: INTERNAL MEDICINE | Facility: CLINIC | Age: 66
End: 2021-10-07
Payer: COMMERCIAL

## 2021-10-07 DIAGNOSIS — M79.632 PAIN OF LEFT FOREARM: Primary | ICD-10-CM

## 2021-10-07 PROCEDURE — 99213 OFFICE O/P EST LOW 20 MIN: CPT | Mod: 95 | Performed by: INTERNAL MEDICINE

## 2021-10-07 RX ORDER — LORAZEPAM 0.5 MG/1
0.5 TABLET ORAL EVERY 6 HOURS PRN
Qty: 10 TABLET | Refills: 1 | Status: SHIPPED | OUTPATIENT
Start: 2021-10-07 | End: 2021-10-22

## 2021-10-07 NOTE — PROGRESS NOTES
Esther is a 66 year old who is being evaluated via a billable video visit.      How would you like to obtain your AVS? MyChart  If the video visit is dropped, the invitation should be resent by: Send to e-mail at: [email or web address]  Will anyone else be joining your video visit? No    Video Start Time: 7 am     Assess/plan   1. Pain of left forearm  Muscle injury/ contusion  Versus long tendonitis versus stress fracture or other bony lesion . Less likely entrapment neuropathy . Given duration of pain and the fact that she has already tried NSAID and compression sleeve , will get imaging . Not sure if PT will help , or more aggressive immobilization with a sling/cast or ortho referral needed .   Will check MRI result and then decide . Meanwhile , wear compression sleeve more and take NSAIDS   - MR Forearm Left w Contrast; Future  - LORazepam (ATIVAN) 0.5 MG tablet; Take 1 tablet (0.5 mg) by mouth every 6 hours as needed for anxiety  Dispense: 10 tablet; Refill: 1    She needs lorazepam before MRI   Subjective   Esther is a 66 year old who presents for the following health issues     HPI   Left forearm pain in the muscles , started In June when she was weeding a large garden . It was very painful and she rested her arm but then did more work in July and august . And now pain is persistent and  constant current of low grade pain , sometimes at night , it keeps her awake . Holding it relieves it , grasping an object for a longer time like a carton of milk , triggers the pain . .not tender , no loss of power , numbness or tingling . No upper arm pain or neck pain           Review of Systems   Constitutional, HEENT, cardiovascular, pulmonary, gi and gu systems are negative, except as otherwise noted.      Objective           Vitals:  No vitals were obtained today due to virtual visit.    Physical Exam   GENERAL: Healthy, alert and no distress  EYES: Eyes grossly normal to inspection.  No discharge or erythema, or obvious  scleral/conjunctival abnormalities.  RESP: No audible wheeze, cough, or visible cyanosis.  No visible retractions or increased work of breathing.    SKIN: Visible skin clear. No significant rash, abnormal pigmentation or lesions.  NEURO: Cranial nerves grossly intact.  Mentation and speech appropriate for age.  PSYCH: Mentation appears normal, affect normal/bright, judgement and insight intact, normal speech and appearance well-groomed.        Assess/plan  1. Pain of left forearm  She describes the pain and points to the middle of her forearm on the dorsum.  There is no point tenderness.  Over the tibia on that she presses down and there is no overlying skin changes.  I do believe she is getting tendinitis or muscle myositis or extensor tendinitis.  Typically it should resolve with nonsteroidals and rest but hers has not.  We will do MRI to make sure there is no other issue or stress fracture or other suspicious bony lesion.  There are some kind of nerve entrapment.  It does not go fall in the numbness or tingling kind of category.  It does not look like referred pain from the neck or the shoulder.  She is coming back for an annual wellness visit we will consider referring for PT if necessary.  - MR Forearm Left w Contrast; Future  - LORazepam (ATIVAN) 0.5 MG tablet; Take 1 tablet (0.5 mg) by mouth every 6 hours as needed for anxiety  Dispense: 10 tablet; Refill: 1        Video-Visit Details    Type of service:  Video Visit    Video End Time:718 am     Originating Location (pt. Location): Home    Distant Location (provider location):  [facility]     Platform used for Video Visit: [id number]

## 2021-10-17 ENCOUNTER — HOSPITAL ENCOUNTER (OUTPATIENT)
Dept: MRI IMAGING | Facility: CLINIC | Age: 66
Discharge: HOME | End: 2021-10-17
Attending: INTERNAL MEDICINE | Admitting: INTERNAL MEDICINE
Payer: COMMERCIAL

## 2021-10-17 DIAGNOSIS — M79.632 PAIN OF LEFT FOREARM: ICD-10-CM

## 2021-10-17 PROCEDURE — 73218 MRI UPPER EXTREMITY W/O DYE: CPT | Mod: LT

## 2021-10-19 ASSESSMENT — ACTIVITIES OF DAILY LIVING (ADL): CURRENT_FUNCTION: NO ASSISTANCE NEEDED

## 2021-10-22 ENCOUNTER — OFFICE VISIT (OUTPATIENT)
Dept: INTERNAL MEDICINE | Facility: CLINIC | Age: 66
End: 2021-10-22
Payer: COMMERCIAL

## 2021-10-22 VITALS
HEART RATE: 72 BPM | DIASTOLIC BLOOD PRESSURE: 76 MMHG | SYSTOLIC BLOOD PRESSURE: 101 MMHG | OXYGEN SATURATION: 97 % | BODY MASS INDEX: 21.95 KG/M2 | WEIGHT: 136 LBS

## 2021-10-22 DIAGNOSIS — E78.2 MIXED HYPERLIPIDEMIA: Primary | ICD-10-CM

## 2021-10-22 DIAGNOSIS — B00.1 HERPES LABIALIS: ICD-10-CM

## 2021-10-22 DIAGNOSIS — M79.632 PAIN OF LEFT FOREARM: ICD-10-CM

## 2021-10-22 DIAGNOSIS — Z23 HIGH PRIORITY FOR 2019-NCOV VACCINE: ICD-10-CM

## 2021-10-22 DIAGNOSIS — E55.9 VITAMIN D INSUFFICIENCY: ICD-10-CM

## 2021-10-22 DIAGNOSIS — R73.03 PREDIABETES: ICD-10-CM

## 2021-10-22 DIAGNOSIS — M79.662 PAIN OF LEFT LOWER LEG: ICD-10-CM

## 2021-10-22 DIAGNOSIS — Z12.31 ENCOUNTER FOR SCREENING MAMMOGRAM FOR BREAST CANCER: ICD-10-CM

## 2021-10-22 DIAGNOSIS — C91.10 CHRONIC LYMPHOCYTIC LEUKEMIA (H): Primary | ICD-10-CM

## 2021-10-22 DIAGNOSIS — C91.10 CLL (CHRONIC LYMPHOCYTIC LEUKEMIA) (H): ICD-10-CM

## 2021-10-22 DIAGNOSIS — C91.10 CHRONIC LYMPHOCYTIC LEUKEMIA (H): ICD-10-CM

## 2021-10-22 LAB
BASOPHILS # BLD MANUAL: 0 10E3/UL (ref 0–0.2)
BASOPHILS NFR BLD MANUAL: 0 %
C REACTIVE PROTEIN LHE: <0.1 MG/DL (ref 0–0.8)
CHOLEST SERPL-MCNC: 211 MG/DL
EOSINOPHIL # BLD MANUAL: 0 10E3/UL (ref 0–0.7)
EOSINOPHIL NFR BLD MANUAL: 0 %
ERYTHROCYTE [DISTWIDTH] IN BLOOD BY AUTOMATED COUNT: 12.6 % (ref 10–15)
ERYTHROCYTE [SEDIMENTATION RATE] IN BLOOD BY WESTERGREN METHOD: 7 MM/HR (ref 0–20)
FASTING STATUS PATIENT QL REPORTED: NO
HBA1C MFR BLD: 5.5 % (ref 0–5.6)
HCT VFR BLD AUTO: 39.9 % (ref 35–47)
HDLC SERPL-MCNC: 81 MG/DL
HGB BLD-MCNC: 12.8 G/DL (ref 11.7–15.7)
LDLC SERPL CALC-MCNC: 118 MG/DL
LYMPHOCYTES # BLD MANUAL: 11.5 10E3/UL (ref 0.8–5.3)
LYMPHOCYTES NFR BLD MANUAL: 79 %
MCH RBC QN AUTO: 30.8 PG (ref 26.5–33)
MCHC RBC AUTO-ENTMCNC: 32.1 G/DL (ref 31.5–36.5)
MCV RBC AUTO: 96 FL (ref 78–100)
MONOCYTES # BLD MANUAL: 0.6 10E3/UL (ref 0–1.3)
MONOCYTES NFR BLD MANUAL: 4 %
NEUTROPHILS # BLD MANUAL: 2.5 10E3/UL (ref 1.6–8.3)
NEUTROPHILS NFR BLD MANUAL: 17 %
PLAT MORPH BLD: ABNORMAL
PLATELET # BLD AUTO: 187 10E3/UL (ref 150–450)
RBC # BLD AUTO: 4.15 10E6/UL (ref 3.8–5.2)
RBC MORPH BLD: ABNORMAL
TRIGL SERPL-MCNC: 59 MG/DL
TSH SERPL DL<=0.005 MIU/L-ACNC: 1.13 UIU/ML (ref 0.3–5)
WBC # BLD AUTO: 14.5 10E3/UL (ref 4–11)

## 2021-10-22 PROCEDURE — 0013A COVID-19,PF,MODERNA (18+ YRS): CPT | Performed by: INTERNAL MEDICINE

## 2021-10-22 PROCEDURE — 99397 PER PM REEVAL EST PAT 65+ YR: CPT | Mod: 25 | Performed by: INTERNAL MEDICINE

## 2021-10-22 PROCEDURE — 91301 COVID-19,PF,MODERNA (18+ YRS): CPT | Performed by: INTERNAL MEDICINE

## 2021-10-22 PROCEDURE — 85652 RBC SED RATE AUTOMATED: CPT | Performed by: INTERNAL MEDICINE

## 2021-10-22 PROCEDURE — 84443 ASSAY THYROID STIM HORMONE: CPT | Performed by: INTERNAL MEDICINE

## 2021-10-22 PROCEDURE — 82306 VITAMIN D 25 HYDROXY: CPT | Performed by: INTERNAL MEDICINE

## 2021-10-22 PROCEDURE — 83036 HEMOGLOBIN GLYCOSYLATED A1C: CPT | Performed by: INTERNAL MEDICINE

## 2021-10-22 PROCEDURE — 36415 COLL VENOUS BLD VENIPUNCTURE: CPT | Performed by: INTERNAL MEDICINE

## 2021-10-22 PROCEDURE — 80061 LIPID PANEL: CPT | Performed by: INTERNAL MEDICINE

## 2021-10-22 PROCEDURE — 86141 C-REACTIVE PROTEIN HS: CPT | Performed by: INTERNAL MEDICINE

## 2021-10-22 PROCEDURE — 99214 OFFICE O/P EST MOD 30 MIN: CPT | Mod: 25 | Performed by: INTERNAL MEDICINE

## 2021-10-22 PROCEDURE — 85027 COMPLETE CBC AUTOMATED: CPT | Performed by: INTERNAL MEDICINE

## 2021-10-22 RX ORDER — ACYCLOVIR 400 MG/1
400 TABLET ORAL 2 TIMES DAILY
Qty: 60 TABLET | Refills: 3 | Status: SHIPPED | OUTPATIENT
Start: 2021-10-22 | End: 2023-03-16

## 2021-10-22 RX ORDER — TRIAMCINOLONE ACETONIDE 0.1 %
PASTE (GRAM) DENTAL
Qty: 5 G | Refills: 1 | Status: SHIPPED | OUTPATIENT
Start: 2021-10-22 | End: 2022-08-26

## 2021-10-22 ASSESSMENT — ACTIVITIES OF DAILY LIVING (ADL): CURRENT_FUNCTION: NO ASSISTANCE NEEDED

## 2021-10-22 NOTE — PROGRESS NOTES
"Answers for HPI/ROS submitted by the patient on 10/19/2021  In general, how would you rate your overall physical health?: good  Frequency of exercise:: 6-7 days/week  Do you usually eat at least 4 servings of fruit and vegetables a day, include whole grains & fiber, and avoid regularly eating high fat or \"junk\" foods? : Yes  Taking medications regularly:: Yes  Medication side effects:: Not applicable  Activities of Daily Living: no assistance needed  Home safety: no safety concerns identified  Hearing Impairment:: no hearing concerns  In the past 6 months, have you been bothered by leaking of urine?: No  In general, how would you rate your overall mental or emotional health?: good  Additional concerns today:: Yes  Duration of exercise:: 45-60 minutes    SUBJECTIVE:   Esther Prince is a 66 year old female who presents for Preventive Visit.she is doing well has some concerns about a sensation she feel in the Left temple a creepy feeling ,   No associated weakness , double vision , headache or changes in vision    The 10-year ASCVD risk score (Rynedaquan MCKEON Jr., et al., 2013) is: 4.5%    Values used to calculate the score:      Age: 66 years      Sex: Female      Is Non- : No      Diabetic: No      Tobacco smoker: No      Systolic Blood Pressure: 101 mmHg      Is BP treated: No      HDL Cholesterol: 85 mg/dL      Total Cholesterol: 218 mg/dL    Patient has been advised of split billing requirements and indicates understanding: Yes   Are you in the first 12 months of your Medicare coverage?  No    Healthy Habits:     In general, how would you rate your overall health?  Good    Frequency of exercise:  6-7 days/week    Duration of exercise:  45-60 minutes    Do you usually eat at least 4 servings of fruit and vegetables a day, include whole grains    & fiber and avoid regularly eating high fat or \"junk\" foods?  Yes    Taking medications regularly:  Yes    Medication side effects:  Not applicable    " Ability to successfully perform activities of daily living:  No assistance needed    Home Safety:  No safety concerns identified    Hearing Impairment:  No hearing concerns    In the past 6 months, have you been bothered by leaking of urine?  No    In general, how would you rate your overall mental or emotional health?  Good      PHQ-2 Total Score: 0    Additional concerns today:  Yes    Do you feel safe in your environment?     Have you ever done Advance Care Planning? (For example, a Health Directive, POLST, or a discussion with a medical provider or your loved ones about your wishes): Yes, advance care planning is on file.  Cognitive Screening   1) Repeat 3 items (Leader, Season, Table)    2) Clock draw: NORMAL  3) 3 item recall: Recalls 3 objects  Results: 3 items recalled: COGNITIVE IMPAIRMENT LESS LIKELY    Mini-CogTM Copyright ALIDA Neri. Licensed by the author for use in [facility]; reprinted with permission (lois@[facility]). All rights reserved.      Do you have sleep apnea, excessive snoring or daytime drowsiness?: no    Reviewed and updated as needed this visit by clinical staff                 Reviewed and updated as needed this visit by Provider                Social History     Tobacco Use     Smoking status: Never Smoker     Smokeless tobacco: Never Used   Substance Use Topics     Alcohol use: Yes     Alcohol/week: 7.0 standard drinks         Alcohol Use 10/19/2021   Prescreen: >3 drinks/day or >7 drinks/week? No               Current providers sharing in care for this patient include:   Patient Care Team:  Lashonda José MD as PCP - General  Lashonda José MD as Assigned PCP  Cassius Garsia MD as Assigned Cancer Care Provider    The following health maintenance items are reviewed in Epic and correct as of today:  Health Maintenance Due   Topic Date Due     ANNUAL REVIEW OF HM ORDERS  Never done     FALL RISK ASSESSMENT  10/20/2021     MEDICARE ANNUAL WELLNESS VISIT  10/19/2021  "        Any new diagnosis of family breast, ovarian, or bowel cancer?     FHS-7: No flowsheet data found.      Pertinent mammograms are reviewed under the imaging tab.    Review of Systems  Constitutional, HEENT, cardiovascular, pulmonary, gi and gu systems are negative, except as otherwise noted.    OBJECTIVE:   There were no vitals taken for this visit. Estimated body mass index is 22.11 kg/m  as calculated from the following:    Height as of 10/19/20: 1.676 m (5' 6\").    Weight as of 10/20/20: 62.1 kg (137 lb).  Physical Exam  GENERAL APPEARANCE: healthy, alert and no distress  EYES: Eyes grossly normal to inspection, PERRL and conjunctivae and sclerae normal  HENT: ear canals and TM's normal, nose and mouth without ulcers or lesions, oropharynx clear and oral mucous membranes moist  NECK: no adenopathy, no asymmetry, masses, or scars and thyroid normal to palpation  RESP: lungs clear to auscultation - no rales, rhonchi or wheezes  BREAST: normal without masses, tenderness or nipple discharge and no palpable axillary masses or adenopathy  CV: regular rate and rhythm, normal S1 S2, no S3 or S4, no murmur, click or rub, no peripheral edema and peripheral pulses strong  ABDOMEN: soft, nontender, no hepatosplenomegaly, no masses and bowel sounds normal  MS: no musculoskeletal defects are noted and gait is age appropriate without ataxia  SKIN: no suspicious lesions or rashes  NEURO: Normal strength and tone, sensory exam grossly normal, mentation intact and speech normal  PSYCH: mentation appears normal and affect normal/bright  No temple artery prominence   Left forearm has no visible skin changes   Diagnostic Test Results:  Labs reviewed in Epic    ASSESSMENT / PLAN:   1. Mixed hyperlipidemia  Low Ascvd score   - Lipid panel reflex to direct LDL Fasting; Future  - CBC with platelets; Future  - ESR: Erythrocyte sedimentation rate; Future  - CRP, inflammation; Future  - TSH; Future  - Hemoglobin A1c; Future  - Lipid " "panel reflex to direct LDL Fasting  - ESR: Erythrocyte sedimentation rate  - CRP, inflammation  - TSH  - Hemoglobin A1c    2. Prediabetes    - Hemoglobin A1c; Future  - Hemoglobin A1c    3. CLL (chronic lymphocytic leukemia) (H)    - CBC with platelets; Future  - COVID-19,PF,MODERNA    4. Vitamin D insufficiency    - Vitamin D Deficiency; Future  - Vitamin D Deficiency    5. Encounter for screening mammogram for breast cancer  Recommend annual screen   - MA Screen Bilateral w/Musa; Future    6. Herpes labialis    - acyclovir (ZOVIRAX) 400 MG tablet; Take 1 tablet (400 mg) by mouth 2 times daily  Dispense: 60 tablet; Refill: 3    7. Pain of left lower leg    - triamcinolone (KENALOG) 0.1 % paste; [TRIAMCINOLONE (KENALOG) 0.1 % PASTE] APPLY TO TEETH 3 (THREE) TIMES A DAY FOR 5 DAYS.  Dispense: 5 g; Refill: 1    8. High priority for 2019-nCoV vaccine  Eligible due to her CLL   - COVID-19,PF,MODERNA    9. Chronic lymphocytic leukemia (H)  Stable being monitored by oncology   - CBC with platelets and differential    10. Pain of left forearm  Mos likely bruising due to over use . No suspicious finding on MRI and on exam     Nothing suspicious to explain temple dysthesia   Recommend CRP and sed rate         Patient has been advised of split billing requirements and indicates understanding: Yes  COUNSELING:  Reviewed preventive health counseling, as reflected in patient instructions    Estimated body mass index is 22.11 kg/m  as calculated from the following:    Height as of 10/19/20: 1.676 m (5' 6\").    Weight as of 10/20/20: 62.1 kg (137 lb).        She reports that she has never smoked. She has never used smokeless tobacco.      Appropriate preventive services were discussed with this patient, including applicable screening as appropriate for cardiovascular disease, diabetes, osteopenia/osteoporosis, and glaucoma.  As appropriate for age/gender, discussed screening for colorectal cancer, prostate cancer, breast cancer, " and cervical cancer. Checklist reviewing preventive services available has been given to the patient.    Reviewed patients plan of care and provided an AVS. The Basic Care Plan (routine screening as documented in Health Maintenance) for Esther meets the Care Plan requirement. This Care Plan has been established and reviewed with the Patient.    Counseling Resources:  ATP IV Guidelines  Pooled Cohorts Equation Calculator  Breast Cancer Risk Calculator  Breast Cancer: Medication to Reduce Risk  FRAX Risk Assessment  ICSI Preventive Guidelines  Dietary Guidelines for Americans, 2010  USDA's MyPlate  ASA Prophylaxis  Lung CA Screening    Lashonda José MD  [facility]    Identified Health Risks:

## 2021-10-22 NOTE — PROGRESS NOTES
RN Cancer Care Triage nurse received call forwarded from reception desk at 12:01PM. Patient left message inquiring which lab she is to have drawn at her PCP office today. Writer noted that Dr. Pena's former order for CBC d/p  yesterday.   Writer entered same lab order and called [facility] clinic because patient was there being seen at the moment. Spoke with Care Coordinator there and relayed the lab order. She reports she will inform lab for drawing this patient.

## 2021-10-25 DIAGNOSIS — E55.9 VITAMIN D INSUFFICIENCY: Primary | ICD-10-CM

## 2021-10-25 LAB — DEPRECATED CALCIDIOL+CALCIFEROL SERPL-MC: 28 UG/L (ref 30–80)

## 2021-10-25 RX ORDER — ERGOCALCIFEROL 1.25 MG/1
50000 CAPSULE, LIQUID FILLED ORAL WEEKLY
Qty: 12 CAPSULE | Refills: 1 | Status: SHIPPED | OUTPATIENT
Start: 2021-10-25 | End: 2022-10-27

## 2021-10-27 ENCOUNTER — ONCOLOGY VISIT (OUTPATIENT)
Dept: ONCOLOGY | Facility: HOSPITAL | Age: 66
End: 2021-10-27
Attending: INTERNAL MEDICINE
Payer: COMMERCIAL

## 2021-10-27 VITALS
WEIGHT: 136.8 LBS | RESPIRATION RATE: 16 BRPM | SYSTOLIC BLOOD PRESSURE: 107 MMHG | OXYGEN SATURATION: 99 % | TEMPERATURE: 98.4 F | HEART RATE: 66 BPM | BODY MASS INDEX: 22.08 KG/M2 | DIASTOLIC BLOOD PRESSURE: 64 MMHG

## 2021-10-27 DIAGNOSIS — C91.10 CHRONIC LYMPHOCYTIC LEUKEMIA (H): Primary | ICD-10-CM

## 2021-10-27 PROCEDURE — G0463 HOSPITAL OUTPT CLINIC VISIT: HCPCS

## 2021-10-27 PROCEDURE — 99213 OFFICE O/P EST LOW 20 MIN: CPT | Performed by: INTERNAL MEDICINE

## 2021-10-27 ASSESSMENT — PAIN SCALES - GENERAL: PAINLEVEL: NO PAIN (0)

## 2021-10-27 NOTE — PROGRESS NOTES
"Oncology Rooming Note    October 27, 2021 8:32 AM   Esther Prince is a 66 year old female who presents for:    Chief Complaint   Patient presents with     Oncology Clinic Visit     Chronic lymphocytic leukemia (H)     Initial Vitals: /64   Pulse 66   Temp 98.4  F (36.9  C)   Resp 16   Wt 62.1 kg (136 lb 12.8 oz)   SpO2 99%   BMI 22.08 kg/m   Estimated body mass index is 22.08 kg/m  as calculated from the following:    Height as of 10/19/20: 1.676 m (5' 6\").    Weight as of this encounter: 62.1 kg (136 lb 12.8 oz). Body surface area is 1.7 meters squared.  No Pain (0) Comment: Data Unavailable   No LMP recorded.  Allergies reviewed: Yes  Medications reviewed: Yes    Medications: Medication refills not needed today.  Pharmacy name entered into [employer]:    [id number] - [place], [place] - [address] [facility] - [place], [place] - [address]  [id number] - [place], [place] - [address]    Clinical concerns: None       Karolina Rivera LPN            "

## 2021-10-27 NOTE — LETTER
"    10/27/2021         RE: Esther Prince  [address]  [address]  [place] [address]        Dear Colleague,    Thank you for referring your patient, Esther Prince, to the [facility]. Please see a copy of my visit note below.    Oncology Rooming Note    October 27, 2021 8:32 AM   Esther Prince is a 66 year old female who presents for:    Chief Complaint   Patient presents with     Oncology Clinic Visit     Chronic lymphocytic leukemia (H)     Initial Vitals: /64   Pulse 66   Temp 98.4  F (36.9  C)   Resp 16   Wt 62.1 kg (136 lb 12.8 oz)   SpO2 99%   BMI 22.08 kg/m   Estimated body mass index is 22.08 kg/m  as calculated from the following:    Height as of 10/19/20: 1.676 m (5' 6\").    Weight as of this encounter: 62.1 kg (136 lb 12.8 oz). Body surface area is 1.7 meters squared.  No Pain (0) Comment: Data Unavailable   No LMP recorded.  Allergies reviewed: Yes  Medications reviewed: Yes    Medications: Medication refills not needed today.  Pharmacy name entered into viVood:    [id number] - [place], [place] - [address] [facility] - [place], [place] - [address]  [id number] - [place], [place] - [address]    Clinical concerns: None       Karolina Rivera LPN              [facility] Progress Note    Patient: Esther Prince  MRN: [id number]  Date of Service: Oct 27, 2021        Assessment and Plan:    Cancer Staging  No matching staging information was found for the patient.    1.  Chronic lymphocytic leukemia: She has had only a very slight increase in her absolute lymphocyte count over the past 2 years.  She has no symptoms from her CLL.  There is no indication for treatment at this time.  She has proven to have a very indolent course and I reviewed this with Esther.  She may never need treatment, and if she does, and will likely be years from now.  We will see her back in clinic in " 1 year.  I asked her to let us know in the interim if she developed any fevers, chills, night sweats, shortness of breath, or any other concerning symptoms.  Questions were answered.    ECOG Performance  0    Diagnosis:    1.  Chronic lymphocytic leukemia: Diagnosed 2011.    Treatment:    Observation.    Interim History:    Esther returns today for a follow-up visit.  In general she has been doing well with the past year.  No significant changes in her health.  No fevers, chills, night sweats, shortness of breath, abdominal pain, or unintentional weight loss.  No acute complaints today.    Review of Systems:    As above in the history.     Review of Systems otherwise Negative for:  General: chills, fever or night sweats  Psychological: anxiety or depression  Ophthalmic: blurry vision, double vision or loss of vision, vision change  ENT: epistaxis, oral lesions, hearing changes  Hematological and Lymphatic: bleeding, bruising, jaundice, swollen lymph nodes  Endocrine: hot flashes, unexpected weight changes  Respiratory: cough, hemoptysis, orthopnea or shortness of breath/MITCHELL  Cardiovascular: chest pain, edema, palpitations or PND  Gastrointestinal: abdominal pain, blood in stools, change in bowel habits, constipation, diarrhea or nausea/vomiting  Genito-Urinary: change in urinary stream, incontinence, frequency/urgency  Musculoskeletal: joint pain, stiffness, swelling, muscle pain  Neurological: dizziness, headaches, numbness/tingling  Dermatological: lumps and rash    Past History:    Past Medical History:   Diagnosis Date     Tick borne fever 2018     Physical Exam:    /64   Pulse 66   Temp 98.4  F (36.9  C)   Resp 16   Wt 62.1 kg (136 lb 12.8 oz)   SpO2 99%   BMI 22.08 kg/m      General: patient appears stated age of 66 year old. Nontoxic and in no distress.   HEENT: Head: atraumatic, normocephalic. Sclerae anicteric.  Chest:  Normal respiratory effort  Cardiac:  No edema.   Abdomen: abdomen is  non-distended  Extremities: normal tone and muscle bulk.  Skin: no lesions or rash on visible skin. Warm and dry.   CNS: alert and oriented. Grossly non-focal.   Psychiatric: normal mood and affect.     Lab Results:    Recent Results (from the past 168 hour(s))   Lipid panel reflex to direct LDL Fasting   Result Value Ref Range    Cholesterol 211 (H) <=199 mg/dL    Triglycerides 59 <=149 mg/dL    Direct Measure HDL 81 >=50 mg/dL    LDL Cholesterol Calculated 118 <=129 mg/dL    Patient Fasting > 8hrs? No    ESR: Erythrocyte sedimentation rate   Result Value Ref Range    Erythrocyte Sedimentation Rate 7 0 - 20 mm/hr   CRP, inflammation   Result Value Ref Range    CRP <0.1 0.0-<0.8 mg/dL   TSH   Result Value Ref Range    TSH 1.13 0.30 - 5.00 uIU/mL   Hemoglobin A1c   Result Value Ref Range    Hemoglobin A1C 5.5 0.0 - 5.6 %   Vitamin D Deficiency   Result Value Ref Range    Vitamin D, Total (25-Hydroxy) 28 (L) 30 - 80 ug/L   CBC with platelets and differential   Result Value Ref Range    WBC Count 14.5 (H) 4.0 - 11.0 10e3/uL    RBC Count 4.15 3.80 - 5.20 10e6/uL    Hemoglobin 12.8 11.7 - 15.7 g/dL    Hematocrit 39.9 35.0 - 47.0 %    MCV 96 78 - 100 fL    MCH 30.8 26.5 - 33.0 pg    MCHC 32.1 31.5 - 36.5 g/dL    RDW 12.6 10.0 - 15.0 %    Platelet Count 187 150 - 450 10e3/uL   Manual Differential   Result Value Ref Range    % Neutrophils 17 %    % Lymphocytes 79 %    % Monocytes 4 %    % Eosinophils 0 %    % Basophils 0 %    Absolute Neutrophils 2.5 1.6 - 8.3 10e3/uL    Absolute Lymphocytes 11.5 (H) 0.8 - 5.3 10e3/uL    Absolute Monocytes 0.6 0.0 - 1.3 10e3/uL    Absolute Eosinophils 0.0 0.0 - 0.7 10e3/uL    Absolute Basophils 0.0 0.0 - 0.2 10e3/uL    RBC Morphology Confirmed RBC Indices     Platelet Assessment  Automated Count Confirmed. Platelet morphology is normal.     Automated Count Confirmed. Platelet morphology is normal.     Imaging:    MR Forearm Left w/o Contrast    Result Date: 10/17/2021  EXAM: MR FOREARM LEFT  WITHOUT CONTRAST LOCATION: [facility] DATE/TIME: 10/17/2021, 10:18 AM INDICATION: Upper extremity pain, nonlocalized (Ped 0-18y). Proximal forearm pain. COMPARISON: None. TECHNIQUE: Unenhanced. A Skin marker was placed over the area of clinical concern. FINDINGS: BONES: -No fracture, bone contusion, or stress reaction. No concerning marrow replacing lesion. SOFT TISSUES:  -No focal mass, or fluid collection. There is nonspecific posterior and medial subcutaneous edema at the elbow/proximal forearm levels. This could relate to reactive or dependent edema, injury, or cellulitis. MUSCLES: -No abnormality in the visualized musculature.     IMPRESSION: Nonspecific subcutaneous edema. Otherwise, unremarkable.       Signed by: Cassius Garsia MD        Again, thank you for allowing me to participate in the care of your patient.        Sincerely,        Cassius Garsia MD

## 2021-10-27 NOTE — PROGRESS NOTES
[facility] Hematology and Oncology Progress Note    Patient: Esther Prince  MRN: [id number]  Date of Service: Oct 27, 2021        Assessment and Plan:    Cancer Staging  No matching staging information was found for the patient.    1.  Chronic lymphocytic leukemia: She has had only a very slight increase in her absolute lymphocyte count over the past 2 years.  She has no symptoms from her CLL.  There is no indication for treatment at this time.  She has proven to have a very indolent course and I reviewed this with Esther.  She may never need treatment, and if she does, and will likely be years from now.  We will see her back in clinic in 1 year.  I asked her to let us know in the interim if she developed any fevers, chills, night sweats, shortness of breath, or any other concerning symptoms.  Questions were answered.    ECOG Performance  0    Diagnosis:    1.  Chronic lymphocytic leukemia: Diagnosed 2011.    Treatment:    Observation.    Interim History:    Esther returns today for a follow-up visit.  In general she has been doing well with the past year.  No significant changes in her health.  No fevers, chills, night sweats, shortness of breath, abdominal pain, or unintentional weight loss.  No acute complaints today.    Review of Systems:    As above in the history.     Review of Systems otherwise Negative for:  General: chills, fever or night sweats  Psychological: anxiety or depression  Ophthalmic: blurry vision, double vision or loss of vision, vision change  ENT: epistaxis, oral lesions, hearing changes  Hematological and Lymphatic: bleeding, bruising, jaundice, swollen lymph nodes  Endocrine: hot flashes, unexpected weight changes  Respiratory: cough, hemoptysis, orthopnea or shortness of breath/MITCHELL  Cardiovascular: chest pain, edema, palpitations or PND  Gastrointestinal: abdominal pain, blood in stools, change in bowel habits, constipation, diarrhea or nausea/vomiting  Genito-Urinary: change in  urinary stream, incontinence, frequency/urgency  Musculoskeletal: joint pain, stiffness, swelling, muscle pain  Neurological: dizziness, headaches, numbness/tingling  Dermatological: lumps and rash    Past History:    Past Medical History:   Diagnosis Date     Tick borne fever 2018     Physical Exam:    /64   Pulse 66   Temp 98.4  F (36.9  C)   Resp 16   Wt 62.1 kg (136 lb 12.8 oz)   SpO2 99%   BMI 22.08 kg/m      General: patient appears stated age of 66 year old. Nontoxic and in no distress.   HEENT: Head: atraumatic, normocephalic. Sclerae anicteric.  Chest:  Normal respiratory effort  Cardiac:  No edema.   Abdomen: abdomen is non-distended  Extremities: normal tone and muscle bulk.  Skin: no lesions or rash on visible skin. Warm and dry.   CNS: alert and oriented. Grossly non-focal.   Psychiatric: normal mood and affect.     Lab Results:    Recent Results (from the past 168 hour(s))   Lipid panel reflex to direct LDL Fasting   Result Value Ref Range    Cholesterol 211 (H) <=199 mg/dL    Triglycerides 59 <=149 mg/dL    Direct Measure HDL 81 >=50 mg/dL    LDL Cholesterol Calculated 118 <=129 mg/dL    Patient Fasting > 8hrs? No    ESR: Erythrocyte sedimentation rate   Result Value Ref Range    Erythrocyte Sedimentation Rate 7 0 - 20 mm/hr   CRP, inflammation   Result Value Ref Range    CRP <0.1 0.0-<0.8 mg/dL   TSH   Result Value Ref Range    TSH 1.13 0.30 - 5.00 uIU/mL   Hemoglobin A1c   Result Value Ref Range    Hemoglobin A1C 5.5 0.0 - 5.6 %   Vitamin D Deficiency   Result Value Ref Range    Vitamin D, Total (25-Hydroxy) 28 (L) 30 - 80 ug/L   CBC with platelets and differential   Result Value Ref Range    WBC Count 14.5 (H) 4.0 - 11.0 10e3/uL    RBC Count 4.15 3.80 - 5.20 10e6/uL    Hemoglobin 12.8 11.7 - 15.7 g/dL    Hematocrit 39.9 35.0 - 47.0 %    MCV 96 78 - 100 fL    MCH 30.8 26.5 - 33.0 pg    MCHC 32.1 31.5 - 36.5 g/dL    RDW 12.6 10.0 - 15.0 %    Platelet Count 187 150 - 450 10e3/uL   Manual  Differential   Result Value Ref Range    % Neutrophils 17 %    % Lymphocytes 79 %    % Monocytes 4 %    % Eosinophils 0 %    % Basophils 0 %    Absolute Neutrophils 2.5 1.6 - 8.3 10e3/uL    Absolute Lymphocytes 11.5 (H) 0.8 - 5.3 10e3/uL    Absolute Monocytes 0.6 0.0 - 1.3 10e3/uL    Absolute Eosinophils 0.0 0.0 - 0.7 10e3/uL    Absolute Basophils 0.0 0.0 - 0.2 10e3/uL    RBC Morphology Confirmed RBC Indices     Platelet Assessment  Automated Count Confirmed. Platelet morphology is normal.     Automated Count Confirmed. Platelet morphology is normal.     Imaging:    MR Forearm Left w/o Contrast    Result Date: 10/17/2021  EXAM: MR FOREARM LEFT WITHOUT CONTRAST LOCATION: [facility] DATE/TIME: 10/17/2021, 10:18 AM INDICATION: Upper extremity pain, nonlocalized (Ped 0-18y). Proximal forearm pain. COMPARISON: None. TECHNIQUE: Unenhanced. A Skin marker was placed over the area of clinical concern. FINDINGS: BONES: -No fracture, bone contusion, or stress reaction. No concerning marrow replacing lesion. SOFT TISSUES:  -No focal mass, or fluid collection. There is nonspecific posterior and medial subcutaneous edema at the elbow/proximal forearm levels. This could relate to reactive or dependent edema, injury, or cellulitis. MUSCLES: -No abnormality in the visualized musculature.     IMPRESSION: Nonspecific subcutaneous edema. Otherwise, unremarkable.       Signed by: Cassius Garsia MD

## 2021-11-30 ENCOUNTER — MYC MEDICAL ADVICE (OUTPATIENT)
Dept: INTERNAL MEDICINE | Facility: CLINIC | Age: 66
End: 2021-11-30
Payer: COMMERCIAL

## 2021-11-30 DIAGNOSIS — M25.529 ELBOW PAIN, UNSPECIFIED LATERALITY: Primary | ICD-10-CM

## 2021-12-02 NOTE — TELEPHONE ENCOUNTER
DIAGNOSIS: unremitting arm and elbow pain . i have done an MRI of the arm . no discernble cause, currently swelling in elbow . concerned about inection /infallamtory disorder/ Dr José   APPOINTMENT DATE: 12.7.21   NOTES STATUS DETAILS   OFFICE NOTE from referring provider Internal 11.30.21 [facility] Medical Advice  10.22.21 Dr Lashonda José, [facility]  10.7.21   OFFICE NOTE from other specialist N/A    DISCHARGE SUMMARY from hospital N/A    DISCHARGE REPORT from the ER N/A    OPERATIVE REPORT N/A    EMG report N/A    MEDICATION LIST Internal    MRI Internal 10.17.21 L forearm   DEXA (osteoporosis/bone health) Internal    CT SCAN N/A    XRAYS (IMAGES & REPORTS) N/A

## 2021-12-06 NOTE — PROGRESS NOTES
ASSESSMENT/PLAN:    (M77.12) Lateral epicondylitis of left elbow  (primary encounter diagnosis)  Comment: exam consistent w/ lateral epicondylitis; wrist brace/ hand therapy; f/u in 4-6 wks; if no better, consider cortisone   Plan: HAND THERAPY Occupational Therapy or Physical Therapy          (M25.529) Elbow pain, unspecified laterality  Comment:   Plan: see above    Ez Ball MD  December 7, 2021  9:02 AM        Pt is a 66 year old female here today for:       L Elbow pain : Pt stated that she was doing a lot of trail cleaning and gardening in June 2021 and began noticing lateral elbow pain.   Location: lateral elbow   Duration: 6 months   Radiation: No   Numbness/ Tingling? No   Weakness? No   Swelling? No   Imaging? MRI - 10/17/21  Treatment tried: Rest, kinesio tape, compression sleeves, heat, stretching    MRI forearm - 10/17/21   FINDINGS:      BONES:   -No fracture, bone contusion, or stress reaction. No concerning marrow replacing lesion.    SOFT TISSUES:    -No focal mass, or fluid collection. There is nonspecific posterior and medial subcutaneous edema at the elbow/proximal forearm levels. This could relate to reactive or dependent edema, injury, or cellulitis.   MUSCLES:  -No abnormality in the visualized musculature.                                                                   IMPRESSION:  Nonspecific subcutaneous edema. Otherwise, unremarkable.      Past Medical History:   Diagnosis Date     Tick borne fever 2018      Past Surgical History:   Procedure Laterality Date     TOTAL HIP ARTHROPLASTY Right 2015     TUBAL LIGATION        Current Outpatient Medications   Medication Sig Dispense Refill     acyclovir (ZOVIRAX) 400 MG tablet Take 1 tablet (400 mg) by mouth 2 times daily 60 tablet 3     calcium carbonate-vitamin D3 600 mg calcium- 200 unit cap [CALCIUM CARBONATE-VITAMIN D3 600 MG CALCIUM- 200 UNIT CAP] Take by mouth.       diclofenac sodium (VOLTAREN) 1 % Gel [DICLOFENAC SODIUM (VOLTAREN)  1 % GEL] APPLY 2 G TOPICALLY EVERY 6 (SIX) HOURS AS NEEDED. APPLY 2 GRAMS PER APPLICATION.  1     EPINEPHrine (EPIPEN 2-DAVID) 0.3 mg/0.3 mL injection [EPINEPHRINE (EPIPEN 2-DAVID) 0.3 MG/0.3 ML INJECTION] Inject 0.3 mL (0.3 mg total) into the shoulder, thigh, or buttocks as needed for anaphylaxis. 2 2     ferrous sulfate 325 (65 FE) MG tablet [FERROUS SULFATE 325 (65 FE) MG TABLET] Take 1 tablet by mouth daily with breakfast.       fexofenadine (ALLEGRA ALLERGY) 180 MG tablet [FEXOFENADINE (ALLEGRA ALLERGY) 180 MG TABLET] Take 180 mg by mouth.       fluticasone propionate (FLONASE) 50 mcg/actuation nasal spray [FLUTICASONE PROPIONATE (FLONASE) 50 MCG/ACTUATION NASAL SPRAY] 1 spray.       glucosam bernstein dip-chondroit-C-Mn 137-728-69-3 mg cap [GLUCOSAM BERNSTEIN DIP-CHONDROIT-C--454-93-3 MG CAP] Take 1 capsule by mouth.       multivitamin (ONE A DAY) per tablet [MULTIVITAMIN (ONE A DAY) PER TABLET] Take 1 tablet by mouth.       omega-3 acid ethyl esters (LOVAZA) 1 gram capsule [OMEGA-3 ACID ETHYL ESTERS (LOVAZA) 1 GRAM CAPSULE] Take 1,000 mg by mouth.       triamcinolone (KENALOG) 0.1 % paste [TRIAMCINOLONE (KENALOG) 0.1 % PASTE] APPLY TO TEETH 3 (THREE) TIMES A DAY FOR 5 DAYS. 5 g 1     vitamin D2 (ERGOCALCIFEROL) 13792 units (1250 mcg) capsule Take 1 capsule (50,000 Units) by mouth once a week 12 capsule 1      No Known Allergies   ROS:   Gen- no fevers/chills   Rheum - no morning stiffness   Derm - no rash/ redness   Neuro - no numbness, no tingling   Remainder of ROS negative.     Exam:   Wt 61.7 kg (136 lb)   BMI 21.95 kg/m       L ELBOW:   Swelling: No   ROM: Flexion - Full/ extension - Full/ pronation - Full / supination- Full.   Strength: 5/5 w/ elbow flexion/ extension   Bony tenderness: No tenderness at medial epicondyle; +TTP at lateral epicondyle/ olecranon.   Neuro: Negative ulnar tinel test.   Maneuvers: No pain w/ resisted wrist flexion/ pronation ; + pain w/ resisted wrist extension/ supination/ long finger  extension; No pain w/ valgus stress

## 2021-12-07 ENCOUNTER — PRE VISIT (OUTPATIENT)
Dept: ORTHOPEDICS | Facility: CLINIC | Age: 66
End: 2021-12-07

## 2021-12-07 ENCOUNTER — OFFICE VISIT (OUTPATIENT)
Dept: ORTHOPEDICS | Facility: CLINIC | Age: 66
End: 2021-12-07
Attending: INTERNAL MEDICINE
Payer: COMMERCIAL

## 2021-12-07 VITALS — WEIGHT: 136 LBS | BODY MASS INDEX: 21.95 KG/M2

## 2021-12-07 DIAGNOSIS — M25.529 ELBOW PAIN, UNSPECIFIED LATERALITY: ICD-10-CM

## 2021-12-07 DIAGNOSIS — M77.12 LATERAL EPICONDYLITIS OF LEFT ELBOW: Primary | ICD-10-CM

## 2021-12-07 PROCEDURE — 99203 OFFICE O/P NEW LOW 30 MIN: CPT | Performed by: FAMILY MEDICINE

## 2021-12-07 NOTE — LETTER
12/7/2021      RE: Esther DIMITRY Prince  [address]  [address]  [place] [address]       ASSESSMENT/PLAN:    (M77.12) Lateral epicondylitis of left elbow  (primary encounter diagnosis)  Comment: exam consistent w/ lateral epicondylitis; wrist brace/ hand therapy; f/u in 4-6 wks; if no better, consider cortisone   Plan: HAND THERAPY Occupational Therapy or Physical Therapy          (M25.529) Elbow pain, unspecified laterality  Comment:   Plan: see above    Ez Ball MD  December 7, 2021  9:02 AM        Pt is a 66 year old female here today for:       L Elbow pain : Pt stated that she was doing a lot of trail cleaning and gardening in June 2021 and began noticing lateral elbow pain.   Location: lateral elbow   Duration: 6 months   Radiation: No   Numbness/ Tingling? No   Weakness? No   Swelling? No   Imaging? MRI - 10/17/21  Treatment tried: Rest, kinesio tape, compression sleeves, heat, stretching    MRI forearm - 10/17/21   FINDINGS:      BONES:   -No fracture, bone contusion, or stress reaction. No concerning marrow replacing lesion.    SOFT TISSUES:    -No focal mass, or fluid collection. There is nonspecific posterior and medial subcutaneous edema at the elbow/proximal forearm levels. This could relate to reactive or dependent edema, injury, or cellulitis.   MUSCLES:  -No abnormality in the visualized musculature.                                                                   IMPRESSION:  Nonspecific subcutaneous edema. Otherwise, unremarkable.      Past Medical History:   Diagnosis Date     Tick borne fever 2018      Past Surgical History:   Procedure Laterality Date     TOTAL HIP ARTHROPLASTY Right 2015     TUBAL LIGATION        Current Outpatient Medications   Medication Sig Dispense Refill     acyclovir (ZOVIRAX) 400 MG tablet Take 1 tablet (400 mg) by mouth 2 times daily 60 tablet 3     calcium carbonate-vitamin D3 600 mg calcium- 200 unit cap [CALCIUM CARBONATE-VITAMIN D3 600 MG CALCIUM- 200 UNIT CAP]  Take by mouth.       diclofenac sodium (VOLTAREN) 1 % Gel [DICLOFENAC SODIUM (VOLTAREN) 1 % GEL] APPLY 2 G TOPICALLY EVERY 6 (SIX) HOURS AS NEEDED. APPLY 2 GRAMS PER APPLICATION.  1     EPINEPHrine (EPIPEN 2-DAVID) 0.3 mg/0.3 mL injection [EPINEPHRINE (EPIPEN 2-DAVID) 0.3 MG/0.3 ML INJECTION] Inject 0.3 mL (0.3 mg total) into the shoulder, thigh, or buttocks as needed for anaphylaxis. 2 2     ferrous sulfate 325 (65 FE) MG tablet [FERROUS SULFATE 325 (65 FE) MG TABLET] Take 1 tablet by mouth daily with breakfast.       fexofenadine (ALLEGRA ALLERGY) 180 MG tablet [FEXOFENADINE (ALLEGRA ALLERGY) 180 MG TABLET] Take 180 mg by mouth.       fluticasone propionate (FLONASE) 50 mcg/actuation nasal spray [FLUTICASONE PROPIONATE (FLONASE) 50 MCG/ACTUATION NASAL SPRAY] 1 spray.       glucosam bernstein dip-chondroit-C-Mn 991-952-18-3 mg cap [GLUCOSAM BERNSTEIN DIP-CHONDROIT-C--162-41-3 MG CAP] Take 1 capsule by mouth.       multivitamin (ONE A DAY) per tablet [MULTIVITAMIN (ONE A DAY) PER TABLET] Take 1 tablet by mouth.       omega-3 acid ethyl esters (LOVAZA) 1 gram capsule [OMEGA-3 ACID ETHYL ESTERS (LOVAZA) 1 GRAM CAPSULE] Take 1,000 mg by mouth.       triamcinolone (KENALOG) 0.1 % paste [TRIAMCINOLONE (KENALOG) 0.1 % PASTE] APPLY TO TEETH 3 (THREE) TIMES A DAY FOR 5 DAYS. 5 g 1     vitamin D2 (ERGOCALCIFEROL) 68380 units (1250 mcg) capsule Take 1 capsule (50,000 Units) by mouth once a week 12 capsule 1      No Known Allergies   ROS:   Gen- no fevers/chills   Rheum - no morning stiffness   Derm - no rash/ redness   Neuro - no numbness, no tingling   Remainder of ROS negative.     Exam:   Wt 61.7 kg (136 lb)   BMI 21.95 kg/m       L ELBOW:   Swelling: No   ROM: Flexion - Full/ extension - Full/ pronation - Full / supination- Full.   Strength: 5/5 w/ elbow flexion/ extension   Bony tenderness: No tenderness at medial epicondyle; +TTP at lateral epicondyle/ olecranon.   Neuro: Negative ulnar tinel test.   Maneuvers: No pain w/ resisted  wrist flexion/ pronation ; + pain w/ resisted wrist extension/ supination/ long finger extension; No pain w/ valgus stress             Ez Ball MD

## 2021-12-10 ENCOUNTER — THERAPY VISIT (OUTPATIENT)
Dept: OCCUPATIONAL THERAPY | Facility: CLINIC | Age: 66
End: 2021-12-10
Attending: FAMILY MEDICINE
Payer: COMMERCIAL

## 2021-12-10 DIAGNOSIS — M77.12 LATERAL EPICONDYLITIS OF LEFT ELBOW: ICD-10-CM

## 2021-12-10 DIAGNOSIS — M25.522 LEFT ELBOW PAIN: ICD-10-CM

## 2021-12-10 PROCEDURE — 97110 THERAPEUTIC EXERCISES: CPT | Mod: GO | Performed by: OCCUPATIONAL THERAPIST

## 2021-12-10 PROCEDURE — 97140 MANUAL THERAPY 1/> REGIONS: CPT | Mod: GO | Performed by: OCCUPATIONAL THERAPIST

## 2021-12-10 PROCEDURE — 97165 OT EVAL LOW COMPLEX 30 MIN: CPT | Mod: GO | Performed by: OCCUPATIONAL THERAPIST

## 2021-12-10 NOTE — PROGRESS NOTES
Hand Therapy Initial Evaluation    Current Date:  12/10/2021    Diagnosis: L LEP  DOI: 12/7/21 MD order (Has been noticing since summer)  DOS: NA  Procedure:  NA    Precautions: None    Subjective:  Esther Prince is a 66 year old female.    Patient reports symptoms of the left elbow which occurred due to unknown cause. Since onset symptoms are Gradually getting worse.  General health as reported by patient is excellent.  Pertinent medical history includes:None  Medical allergies:none.  Surgical history: orthopedic.  Medication history: None.    Current occupation is retired from school counsuler - prior volleyball, current biking and paddling in the summer, cross country skiing in winter.   Job Tasks: Computer Work, Driving, Lifting, Carrying, Repetitive Tasks    Occupational Profile Information:  Right hand dominant  Prior functional level:  no limitations  Patient reports symptoms of pain and weakness/loss of strength  Special tests:  MRI (-)  Previous treatment: None   Barriers include:none  Mobility: No difficulty  Transportation: drives  Currently retired  Leisure activities/hobbies: Cross country skiing, gardening, winter sports.   Other: Issues with flossing, prior R LEP ~10 years ago. 3 weeks of [place] [facility] [place]tra[place], etc) in the summer, then gardening continued flair. ~13 yo partial shoulder dislocation.     Functional Outcome Measure:   Upper Extremity Functional Index Score:  SCORE:   Column Totals: /80: (P) 60   (A lower score indicates greater disability.)    Objective:  Pain Level (Scale 0-10)   12/10/2021   At Rest 3   With Use 8.5     Pain Description  Date 12/10/2021   Location L elbow and just below dorsally, radiates to inner elbow    Pain Quality Gnawing, Nagging and Stabbing   Frequency constant     Pain is worst  daytime    Exacerbated by  Movement, flossing, weeding, shoveling snow    Relieved by heat and rest   Progression Getting worse   "    Posture  Normal    Sensation  WNL throughout all nerve distributions; per patient report    ROM  Pain Report: - none  + mild    ++ moderate    +++ severe   Elbow 12/10/2021 12/10/2021   AROM (PROM) R L   Extension 0 0   Flexion 154 152+   Supination WFL WFL   Pronation WFL WFL     Wrist 12/10/2021 12/10/2021   AROM (PROM) R L   Extension 95 75   Flexion 75 80   RD 25 30   UD 45 35     Resisted Testing  Pain Report:  - none    + mild    ++ moderate    +++ severe    12/10/2021   Long Finger Test +++     Neural Tension Testing  RNT: Radial Neurodynamic Test (based on DS Calderon's ULNT)   12/10/2021   0-5 Scale    Position:   0/5: Arm across abdomen in coronal plane  1/5: Depress shoulder, ER to neutral ABD shoulder to 45 degrees  2/5: IR shoulder to end range, keep elbow at 90 degrees  3/5: Extend elbow to 0 degrees  4/5: Fully pronate forearm  5/5: Flex wrist and fingers with UD  Notes:    (+) indicates beyond grade level but less than custodial to next level    (-) indicates over custodial to level    S1  onset/change of patient's symptoms    S2 definite stop point based on patient's discomfort level    Strength   (Measured in pounds)  Pain Report: - none  + mild    ++ moderate    +++ severe    12/10/2021 12/10/2021   Trials R L   1  2  3 90 35+++   Average         12/10/2021 12/10/2021    R L   Elbow Ext 85 30+++     Palpation  Pain Report:  - none    + mild    ++ moderate    +++ severe    12/10/2021   Pec Major \"Tired\"   Pec Minor \"Tired\"   Proximal Triceps -   Spiral Groove +   Distal Triceps -   Anconeus    ECRB at LEP ++   ECU at LEP ++   EDC at LEP +++   Radial Head +   Extensor Wad ++   PIN Site -     Assessment:  Patient presents with symptoms consistent with diagnosis of right elbow LEP, with conservative intervention.     Patient's limitations or Problem List includes:  Pain, Decreased ROM/motion, Decreased , Decreased pinch and Tightness in musculature of the left elbow which interferes with the " patient's ability to perform Self Care Tasks (dressing), Work Tasks, Sleep Patterns, Recreational Activities and Household Chores as compared to previous level of function.    Rehab Potential:  Excellent - Return to full activity, no limitations    Patient will benefit from skilled Occupational Therapy to increase ROM, flexibility, motion, overall strength,  strength, pinch strength, coordination and dexterity and decrease pain and edema to return to previous activity level and resume normal daily tasks and to reach their rehab potential.    Barriers to Learning:  No barrier    Communication Issues:  Patient appears to be able to clearly communicate and understand verbal and written communication and follow directions correctly.    Chart Review: Chart Review and Simple history review with patient    Identified Performance Deficits: dressing, driving and community mobility, health management and maintenance, home establishment and management, meal preparation and cleanup, sleep, work, volunteer activities and leisure activities    Assessment of Occupational Performance:  5 or more Performance Deficits    Clinical Decision Making (Complexity): Low complexity    Treatment Explanation:  The following has been discussed with the patient:  RX ordered/plan of care  Anticipated outcomes  Possible risks and side effects    Plan:  Frequency:  1 X week, once daily  Duration:  for 8 weeks    Treatment Plan:    Modalities:    US, Iontophoresis, Fluidotherapy, Paraffin, TENS and E-Stim   Therapeutic Exercise:    AROM, AAROM, PROM, Tendon Gliding, Blocking, Reverse Blocking, Place and Hold, Contract Relax, Extensor Tracking, Isotonics, Isometrics and Stabilization  Therapeutic Activities:  Functional activities   Neuromuscular re-ed:   Nerve Gliding, Coordination/Dexterity, Proprioceptive Training, Posture, Kinesiotaping, Strain Counter Strain, Isometrics and Stabilization  Manual Techniques:   Coordination/Dexterity, Joint  mobilization, Friction massage, Myofascial release and Manual edema mobilization  Orthotic Fabrication:    Static, Static progressive and Dynamic  Self Care:    Self Care Tasks, Ergonomic Considerations, Community Transportation and Work Tasks    Discharge Plan:  Achieve all LTG.  Independent in home treatment program.  Reach maximal therapeutic benefit.    Home Program:   Warmth for stiffness  Ice after activity for pain  PROM with stretch to wrist extensors and flexors  TFM to LEP  MFR to extensors, avoiding PIN site  Night wear of pre-мария wrist support   Avoid activities that exacerbate pain in the elbow  Lift with elbows at side and forearms in neutral/supinated position      Next Visit:  Assess HEP  Massage   K tape

## 2021-12-11 PROBLEM — M25.522 LEFT ELBOW PAIN: Status: ACTIVE | Noted: 2021-12-11

## 2021-12-11 NOTE — PROGRESS NOTES
[facility]    OUTPATIENT Occupational Therapy ORTHOPEDIC EVALUATION  PLAN OF TREATMENT FOR OUTPATIENT REHABILITATION  (COMPLETE FOR INITIAL CLAIMS ONLY)  Patient's Last Name, First Name, M.I.  YOB: 1955  Esther Prince    Provider s Name:  [facility]   Medical Record No.  [id number]   Start of Care Date:  12/10/21   Onset Date:   12/07/21 (MD order )   Type:     ___PT   __x_OT Medical Diagnosis:    Encounter Diagnoses   Name Primary?     Lateral epicondylitis of left elbow      Left elbow pain         Treatment Diagnosis:  L LEP        Goals:     12/10/21 0500   Goal #1   Goal #1 household chores   Previous Performance Level Independent   Current Functional Task   (Vacuuming, sweeping or racking )   Current Performance Level Quite a bit of difficulty    STG Target Perfomance   (Vacuuming, sweeping or racking )   STG Target Perform Level Moderate difficulty    Due Date 01/08/22   LTG Target Task/Performance No Difficulty       Therapy Frequency:  1x per week   Predicted Duration of Therapy Intervention:  8 weeks     HARLEY WALDEN OT                 I CERTIFY THE NEED FOR THESE SERVICES FURNISHED UNDER        THIS PLAN OF TREATMENT AND WHILE UNDER MY CARE     (Physician attestation of this document indicates review and certification of the therapy plan).                       Certification Date From:  12/10/21   Certification Date To:  02/07/22    Referring Provider:  Ez Ball    Initial Assessment        See Epic Evaluation SOC Date: 12/10/21

## 2021-12-17 ENCOUNTER — THERAPY VISIT (OUTPATIENT)
Dept: OCCUPATIONAL THERAPY | Facility: CLINIC | Age: 66
End: 2021-12-17
Payer: COMMERCIAL

## 2021-12-17 DIAGNOSIS — M25.522 LEFT ELBOW PAIN: ICD-10-CM

## 2021-12-17 PROCEDURE — 97110 THERAPEUTIC EXERCISES: CPT | Mod: GO | Performed by: OCCUPATIONAL THERAPIST

## 2021-12-17 PROCEDURE — 2894A PR NEUROMUSCULAR REEDUCATION,1+ AREAS, EA 15 MIN: CPT | Mod: GO | Performed by: OCCUPATIONAL THERAPIST

## 2021-12-17 PROCEDURE — 97140 MANUAL THERAPY 1/> REGIONS: CPT | Mod: GO | Performed by: OCCUPATIONAL THERAPIST

## 2021-12-24 ENCOUNTER — THERAPY VISIT (OUTPATIENT)
Dept: OCCUPATIONAL THERAPY | Facility: CLINIC | Age: 66
End: 2021-12-24
Payer: COMMERCIAL

## 2021-12-24 DIAGNOSIS — M25.522 LEFT ELBOW PAIN: ICD-10-CM

## 2021-12-24 PROCEDURE — 97140 MANUAL THERAPY 1/> REGIONS: CPT | Mod: GO | Performed by: OCCUPATIONAL THERAPIST

## 2021-12-24 PROCEDURE — 97110 THERAPEUTIC EXERCISES: CPT | Mod: GO | Performed by: OCCUPATIONAL THERAPIST

## 2022-01-06 ENCOUNTER — ANCILLARY PROCEDURE (OUTPATIENT)
Dept: MAMMOGRAPHY | Facility: CLINIC | Age: 67
End: 2022-01-06
Attending: INTERNAL MEDICINE
Payer: COMMERCIAL

## 2022-01-06 DIAGNOSIS — Z12.31 ENCOUNTER FOR SCREENING MAMMOGRAM FOR BREAST CANCER: ICD-10-CM

## 2022-01-06 PROCEDURE — 77063 BREAST TOMOSYNTHESIS BI: CPT | Mod: GC

## 2022-01-06 PROCEDURE — 77067 SCR MAMMO BI INCL CAD: CPT | Mod: GC

## 2022-01-07 ENCOUNTER — THERAPY VISIT (OUTPATIENT)
Dept: OCCUPATIONAL THERAPY | Facility: CLINIC | Age: 67
End: 2022-01-07
Payer: COMMERCIAL

## 2022-01-07 DIAGNOSIS — M25.522 LEFT ELBOW PAIN: ICD-10-CM

## 2022-01-07 PROCEDURE — 97110 THERAPEUTIC EXERCISES: CPT | Mod: GO | Performed by: OCCUPATIONAL THERAPIST

## 2022-01-07 PROCEDURE — 97140 MANUAL THERAPY 1/> REGIONS: CPT | Mod: GO | Performed by: OCCUPATIONAL THERAPIST

## 2022-01-21 ENCOUNTER — THERAPY VISIT (OUTPATIENT)
Dept: OCCUPATIONAL THERAPY | Facility: CLINIC | Age: 67
End: 2022-01-21
Payer: COMMERCIAL

## 2022-01-21 DIAGNOSIS — M25.522 LEFT ELBOW PAIN: ICD-10-CM

## 2022-01-21 PROCEDURE — 97110 THERAPEUTIC EXERCISES: CPT | Mod: GO | Performed by: OCCUPATIONAL THERAPIST

## 2022-01-21 PROCEDURE — 97035 APP MDLTY 1+ULTRASOUND EA 15: CPT | Mod: GO | Performed by: OCCUPATIONAL THERAPIST

## 2022-01-21 NOTE — PROGRESS NOTES
"Hand Therapy Progress Note    Current Date:  1/21/2022  Diagnosis: L LEP  DOI: 12/7/21 MD order (Has been noticing since summer)  DOS: NA  Procedure:  NA    Precautions: None    Reporting period is 12/10/21 to 1/21/2022    Subjective:   Subjective changes noted by patient:  Pt can now  toothbrush and comb hair. Still issues/pain gripping with resistance. Pt has been able to cross country ski.   Functional changes noted by patient:  Improvement in Self Care Tasks (hygiene/toileting)  Patient has noted adverse reaction to:  None    Functional Outcome Measure:  Not obtained d/t time restrictions.     Objective:    Please refer to the daily flowsheet for treatment provided today.   Pain Level (Scale 0-10)   12/10/2021 01/21/22   At Rest 3 0   With Use 8.5 1     Pain Description  Date 12/10/2021 01/21/22     Location L elbow and just below dorsally, radiates to inner elbow  L LEP   Pain Quality Gnawing, Nagging and Stabbing \"Hard soreness\"   Frequency constant   Intermittent    Pain is worst  daytime  Daytime    Exacerbated by  Movement, flossing, weeding, shoveling snow  Moving, gripping    Relieved by heat and rest Stretching, rest    Progression Getting worse  Getting better     Posture  Normal    Sensation  WNL throughout all nerve distributions; per patient report    ROM  Pain Report: - none  + mild    ++ moderate    +++ severe   Elbow 12/10/2021 12/10/2021 01/21/22     AROM (PROM) R L    Extension 0 0    Flexion 154 152+ No pain    Supination WFL WFL    Pronation WFL WFL      Wrist 12/10/2021 12/10/2021 01/21/22     AROM (PROM) R L    Extension 95 75 76   Flexion 75 80 80   RD 25 30 40   UD 45 35 40     Resisted Testing  Pain Report:  - none    + mild    ++ moderate    +++ severe    12/10/2021   Long Finger Test +++     Strength   (Measured in pounds)  Pain Report: - none  + mild    ++ moderate    +++ severe    12/10/2021 12/10/2021 01/21/22     Trials R L L   1  2  3 90 35+++ 40+   Average          " "12/10/2021 12/10/2021 01/21/22      R L L   Elbow Ext 85 30+++ 20++     Palpation  Pain Report:  - none    + mild    ++ moderate    +++ severe    12/10/2021   Pec Major \"Tired\"   Pec Minor \"Tired\"   Proximal Triceps -   Spiral Groove +   Distal Triceps -   Anconeus    ECRB at LEP ++   ECU at LEP ++   EDC at LEP +++   Radial Head +   Extensor Wad ++   PIN Site -     Assessment:  Response to therapy has been improvement to:  ROM of Wrist  Strength  Pain:  frequency is less and intensity of pain is decreased  Self Care Skills    Overall Assessment:  Patient would benefit from continued therapy to achieve rehab potential  STG/LTG:  STGoals have been reviewed and progress or achievement has occurred;  see goal sheet for details and updates.    Plan:  Frequency/Duration:  Recommend continuing with the current treatment plan. 1 X week, once daily  for 8 weeks  Appropriateness of Rx I have re-evaluated this patient and find that the nature, scope, duration and intensity of the therapy is appropriate for the medical condition of the patient.  Recommendations for Continued Therapy    Treatment Plan:  Modalities:    US, Iontophoresis, Fluidotherapy, Paraffin, TENS and E-Stim   Therapeutic Exercise:    AROM, AAROM, PROM, Tendon Gliding, Blocking, Reverse Blocking, Place and Hold, Contract Relax, Extensor Tracking, Isotonics, Isometrics and Stabilization  Therapeutic Activities:  Functional activities   Neuromuscular re-ed:   Nerve Gliding, Coordination/Dexterity, Proprioceptive Training, Posture, Kinesiotaping, Strain Counter Strain, Isometrics and Stabilization  Manual Techniques:   Coordination/Dexterity, Joint mobilization, Friction massage, Myofascial release and Manual edema mobilization  Orthotic Fabrication:    Static, Static progressive and Dynamic  Self Care:    Self Care Tasks, Ergonomic Considerations, Community Transportation and Work Tasks      Home Exercise Program:  PTRX Report  The following values have been " sent to Epic.  Friction Massage  EMR Notes  HEP - Sets  Reps  Sessions per day 2  Notes 5 minutes each  Ball Massage to Extensors  EMR Notes  HEP - Sets  Reps  Sessions per day 2  Notes 5 minutes each Can place two tennis balls together  Forearm Passive Range of Motion Extensor Stretch  EMR Notes  HEP - Sets  Reps 3-4  Sessions per day 2  Notes 15 second hold  Forearm Passive Range of Motion Flexor Stretch  EMR Notes  HEP - Sets  Reps 3  Sessions per day 2-3  Notes  Nerve Gliding Proximal Radial  EMR Notes  HEP - Sets  Reps 3-5  Sessions per day 2-3  Notes  Latissimus Dorsi Stretch Seated  EMR Notes  HEP - Sets  Reps 5-10  Sessions per day 2-3  Notes  Warmth  EMR Notes  HEP - Sets  Reps  Sessions per day  Notes Can use warm washcloth or water  TENNIS ELBOW PREVENTION  EMR Notes  HEP - Sets  Reps  Sessions per day  Notes  Education Sheet General  EMR Notes  HEP - Sets  Reps  Sessions per day  Notes Try wearing wrist support at night and during heavy activities.  Kinesiotape  EMR Notes  HEP - Sets  Reps  Sessions per day  Notes  Wrist Strengthening Eccentric Extension with Dumb Lal  EMR Notes  HEP - Sets  Reps 5-10  Sessions per day 1  Notes Water bottle or soup can  Nerve Flossing Highly Irritable Radial Nerve  EMR Notes  HEP - Sets  Reps 3  Sessions per day 3-4  Notes  Hand Strengthening Gripping  EMR Notes  HEP - Sets  Reps 5-10  Sessions per day 2-3  Notes Stable ball, hold for a few seconds      Next Visit:  Assess HEP  Progress as tolerated

## 2022-02-04 ENCOUNTER — THERAPY VISIT (OUTPATIENT)
Dept: OCCUPATIONAL THERAPY | Facility: CLINIC | Age: 67
End: 2022-02-04
Payer: COMMERCIAL

## 2022-02-04 DIAGNOSIS — M25.522 LEFT ELBOW PAIN: ICD-10-CM

## 2022-02-04 PROCEDURE — 97140 MANUAL THERAPY 1/> REGIONS: CPT | Mod: GO | Performed by: OCCUPATIONAL THERAPIST

## 2022-02-04 PROCEDURE — 97110 THERAPEUTIC EXERCISES: CPT | Mod: GO | Performed by: OCCUPATIONAL THERAPIST

## 2022-02-11 ENCOUNTER — OFFICE VISIT (OUTPATIENT)
Dept: ORTHOPEDICS | Facility: CLINIC | Age: 67
End: 2022-02-11
Payer: COMMERCIAL

## 2022-02-11 DIAGNOSIS — M77.12 LATERAL EPICONDYLITIS OF LEFT ELBOW: Primary | ICD-10-CM

## 2022-02-11 PROCEDURE — 20606 DRAIN/INJ JOINT/BURSA W/US: CPT | Mod: LT | Performed by: FAMILY MEDICINE

## 2022-02-11 PROCEDURE — 99207 PR DROP WITH A PROCEDURE: CPT | Performed by: FAMILY MEDICINE

## 2022-02-11 RX ORDER — LIDOCAINE HYDROCHLORIDE 10 MG/ML
0.5 INJECTION, SOLUTION EPIDURAL; INFILTRATION; INTRACAUDAL; PERINEURAL
Status: DISCONTINUED | OUTPATIENT
Start: 2022-02-11 | End: 2024-05-16

## 2022-02-11 RX ORDER — METHYLPREDNISOLONE ACETATE 40 MG/ML
20 INJECTION, SUSPENSION INTRA-ARTICULAR; INTRALESIONAL; INTRAMUSCULAR; SOFT TISSUE
Status: DISCONTINUED | OUTPATIENT
Start: 2022-02-11 | End: 2024-05-16

## 2022-02-11 RX ORDER — LIDOCAINE HYDROCHLORIDE 10 MG/ML
1 INJECTION, SOLUTION EPIDURAL; INFILTRATION; INTRACAUDAL; PERINEURAL
Status: DISCONTINUED | OUTPATIENT
Start: 2022-02-11 | End: 2024-05-16

## 2022-02-11 RX ADMIN — LIDOCAINE HYDROCHLORIDE 1 ML: 10 INJECTION, SOLUTION EPIDURAL; INFILTRATION; INTRACAUDAL; PERINEURAL at 07:49

## 2022-02-11 RX ADMIN — METHYLPREDNISOLONE ACETATE 20 MG: 40 INJECTION, SUSPENSION INTRA-ARTICULAR; INTRALESIONAL; INTRAMUSCULAR; SOFT TISSUE at 07:49

## 2022-02-11 RX ADMIN — LIDOCAINE HYDROCHLORIDE 0.5 ML: 10 INJECTION, SOLUTION EPIDURAL; INFILTRATION; INTRACAUDAL; PERINEURAL at 07:49

## 2022-02-11 NOTE — NURSING NOTE
[address] [address]  Dept: [phone]  ______________________________________________________________________________    Patient: Esther Prince   : 1955   MRN: [id number]   2022    INVASIVE PROCEDURE SAFETY CHECKLIST    Date: 22   Procedure:L lateral epicondylitis CSI with depomedrol US guided  Patient Name: Esther Prince  MRN: [id number]  YOB: 1955    Action: Complete sections as appropriate. Any discrepancy results in a HARD COPY until resolved.     PRE PROCEDURE:  Patient ID verified with 2 identifiers (name and  or MRN): Yes  Procedure and site verified with patient/designee (when able): Yes  Accurate consent documentation in medical record: Yes  H&P (or appropriate assessment) documented in medical record: Yes  H&P must be up to 20 days prior to procedure and updates within 24 hours of procedure as applicable: NA  Relevant diagnostic and radiology test results appropriately labeled and displayed as applicable: Yes  Procedure site(s) marked with provider initials: NA    TIMEOUT:  Time-Out performed immediately prior to starting procedure, including verbal and active participation of all team members addressing the following:Yes  * Correct patient identify  * Confirmed that the correct side and site are marked  * An accurate procedure consent form  * Agreement on the procedure to be done  * Correct patient position  * Relevant images and results are properly labeled and appropriately displayed  * The need to administer antibiotics or fluids for irrigation purposes during the procedure as applicable   * Safety precautions based on patient history or medication use    DURING PROCEDURE: Verification of correct person, site, and procedures any time the responsibility for care of the patient is transferred to another member of the care team.       Prior to injection, verified patient identity using patient's  name and date of birth.  Due to injection administration, patient instructed to remain in clinic for 15 minutes  afterwards, and to report any adverse reaction to me immediately.    Tendon sheath injection was performed.     Drug Amount Wasted:  Yes: .5 mg/ml   Vial/Syringe: Single dose vial  Expiration Date:  8/23 - depomedrol      Raffy Lange, ATC  February 11, 2022

## 2022-02-11 NOTE — PROGRESS NOTES
"PROCEDURE ENCOUNTER    [facility]  Orthopedics  John Chaves DO  2022     Name: Esther Prince  MRN: [id number]  Age: 66 year old  : 1955    Referring provider: Dr. Ez Ball MD  Diagnosis: Lateral epicondylitis of left elbow    Elbow Injection, Common extensor tendon sheath - Ultrasound Guided  The patient was informed of the risks and the benefits of the procedure and a written consent was signed.  The patient s left elbow was prepped with chlorhexidine in sterile fashion.   20 mg of methylprednisolone suspension was drawn up into a 3 mL syringe with 1.5 mL of 1% lidocaine.  Injection was performed using sterile technique.  Under ultrasound guidance a 1.5\" 22-gauge needle was used to enter the common extensor tendon sheath, left elbow.  Needle placement was visualized and documented with ultrasound.  Ultrasound visualization required to ensure injection material enters the tendon sheath and not the tendon itself.  Injection performed long axis to the probe.  Injection solution visualized within the tendon sheath.  Images were permanently stored for the patient's record.  There were no complications. The patient tolerated the procedure well. There was negligible bleeding.   The patient was instructed to ice the elbow upon leaving clinic and refrain from overuse over the next 3 days.   Continue with occupational therapist and HEP.  The patient was instructed to call or go to the emergency room with any unusual pain, swelling, redness, or if otherwise concerned.  Follow up Dr. Ball.  Medium Joint Injection/Arthrocentesis    Date/Time: 2022 7:49 AM  Performed by: John Chaves DO  Authorized by: John Chaves DO     Indications:  Pain  Needle Size:  25 G  Guidance: ultrasound    Approach:  Lateral  Location:  Elbow  Location comment:  L Lateral Epicondyle  Medications:  20 mg methylPREDNISolone 40 MG/ML; 0.5 mL lidocaine (PF) 1 %; 1 mL lidocaine (PF) 1 %  Outcome:  Tolerated well, no " immediate complications  Procedure discussed: discussed risks, benefits, and alternatives    Consent Given by:  Patient  Timeout: timeout called immediately prior to procedure    Prep: patient was prepped and draped in usual sterile fashion     Scribed by Raffy Lange ATC, ATC for Dr. Chaves on 2/11/22 at 7:40AM, based on the provider's statements to me.          John Chaves DO [facility]  Primary Care Sports Medicine  [facility]

## 2022-02-11 NOTE — LETTER
"  2022      RE: Esther Prince  [address] [place] [address]       PROCEDURE ENCOUNTER    [facility]  Orthopedics  John Chaves DO  2022     Name: Esther Prince  MRN: [id number]  Age: 66 year old  : 1955    Referring provider: Dr. Ez Ball MD  Diagnosis: Lateral epicondylitis of left elbow    Elbow Injection, Common extensor tendon sheath - Ultrasound Guided  The patient was informed of the risks and the benefits of the procedure and a written consent was signed.  The patient s left elbow was prepped with chlorhexidine in sterile fashion.   20 mg of methylprednisolone suspension was drawn up into a 3 mL syringe with 1.5 mL of 1% lidocaine.  Injection was performed using sterile technique.  Under ultrasound guidance a 1.5\" 22-gauge needle was used to enter the common extensor tendon sheath, left elbow.  Needle placement was visualized and documented with ultrasound.  Ultrasound visualization required to ensure injection material enters the tendon sheath and not the tendon itself.  Injection performed long axis to the probe.  Injection solution visualized within the tendon sheath.  Images were permanently stored for the patient's record.  There were no complications. The patient tolerated the procedure well. There was negligible bleeding.   The patient was instructed to ice the elbow upon leaving clinic and refrain from overuse over the next 3 days.   Continue with occupational therapist and HEP.  The patient was instructed to call or go to the emergency room with any unusual pain, swelling, redness, or if otherwise concerned.  Follow up Dr. Ball.  Medium Joint Injection/Arthrocentesis    Date/Time: 2022 7:49 AM  Performed by: John Chaves DO  Authorized by: John Chaves DO     Indications:  Pain  Needle Size:  25 G  Guidance: ultrasound    Approach:  Lateral  Location:  Elbow  Location comment:  L Lateral Epicondyle  Medications:  20 mg methylPREDNISolone 40 " MG/ML; 0.5 mL lidocaine (PF) 1 %; 1 mL lidocaine (PF) 1 %  Outcome:  Tolerated well, no immediate complications  Procedure discussed: discussed risks, benefits, and alternatives    Consent Given by:  Patient  Timeout: timeout called immediately prior to procedure    Prep: patient was prepped and draped in usual sterile fashion     Scribed by Raffy Lange ATC, ATC for Dr. Chaves on 2/11/22 at 7:40AM, based on the provider's statements to me.    John Chaves DO [facility]  Primary Care Sports Medicine  [facility]

## 2022-02-18 ENCOUNTER — THERAPY VISIT (OUTPATIENT)
Dept: OCCUPATIONAL THERAPY | Facility: CLINIC | Age: 67
End: 2022-02-18
Payer: COMMERCIAL

## 2022-02-18 DIAGNOSIS — M25.522 LEFT ELBOW PAIN: ICD-10-CM

## 2022-02-18 PROCEDURE — 97140 MANUAL THERAPY 1/> REGIONS: CPT | Mod: GO | Performed by: OCCUPATIONAL THERAPIST

## 2022-02-18 PROCEDURE — 97110 THERAPEUTIC EXERCISES: CPT | Mod: GO | Performed by: OCCUPATIONAL THERAPIST

## 2022-02-18 NOTE — PROGRESS NOTES
"SOAP note objective information for 2/18/2022.  Please refer to the daily flowsheet for treatment today, total treatment time and time spent performing 1:1 timed codes.       Subjective: Pt receiving cortisone injection 2/11/22. Reports pain as improved for upcoming 8 day ski trip.     Objective: Strength and pain measured. Pt further educated to avoid gripping/strengthening at this time. Manual massage of L FA extensors and distal triceps performed - tool assisted. Cortisone injection site avoided during massage for efficacy.     Please refer to the daily flowsheet for treatment provided today.     Pain Level (Scale 0-10)   12/10/2021 01/21/22 02/18/22     At Rest 3 0 0   With Use 8.5 1 3     Pain Description  Date 12/10/2021 01/21/22     Location L elbow and just below dorsally, radiates to inner elbow  L LEP   Pain Quality Gnawing, Nagging and Stabbing \"Hard soreness\"   Frequency constant   Intermittent    Pain is worst  daytime  Daytime    Exacerbated by  Movement, flossing, weeding, shoveling snow  Moving, gripping    Relieved by heat and rest Stretching, rest    Progression Getting worse  Getting better     Posture  Normal    Sensation  WNL throughout all nerve distributions; per patient report    ROM  Pain Report: - none  + mild    ++ moderate    +++ severe   Elbow 12/10/2021 12/10/2021 01/21/22     AROM (PROM) R L    Extension 0 0    Flexion 154 152+ No pain    Supination WFL WFL    Pronation WFL WFL      Wrist 12/10/2021 12/10/2021 01/21/22     AROM (PROM) R L    Extension 95 75 76   Flexion 75 80 80   RD 25 30 40   UD 45 35 40     Resisted Testing  Pain Report:  - none    + mild    ++ moderate    +++ severe    12/10/2021   Long Finger Test +++     Strength   (Measured in pounds)  Pain Report: - none  + mild    ++ moderate    +++ severe    12/10/2021 12/10/2021 01/21/22   02/18/22   02/18/22     Trials R L L R L   1  2  3 90 35+++ 40+ 75 45   Average            12/10/2021 12/10/2021  01/21/22      R " "L  L   Elbow Ext 85 30+++  20++     Palpation  Pain Report:  - none    + mild    ++ moderate    +++ severe    12/10/2021   Pec Major \"Tired\"   Pec Minor \"Tired\"   Proximal Triceps -   Spiral Groove +   Distal Triceps -   Anconeus    ECRB at LEP ++   ECU at LEP ++   EDC at LEP +++   Radial Head +   Extensor Wad ++   PIN Site -       " Mild

## 2022-02-24 ENCOUNTER — TELEPHONE (OUTPATIENT)
Dept: ONCOLOGY | Facility: HOSPITAL | Age: 67
End: 2022-02-24
Payer: COMMERCIAL

## 2022-02-24 NOTE — TELEPHONE ENCOUNTER
Patient calls in today stating that she would like to further look into getting the EVUSHELD medication for COVID prevention. She received a letter in the mail about this and is very interested.  I will forward this on to Dr Garsia so we can get orders and have someone guy l her back to schedule.    Joan Islas RN

## 2022-02-28 ENCOUNTER — TELEPHONE (OUTPATIENT)
Dept: ONCOLOGY | Facility: HOSPITAL | Age: 67
End: 2022-02-28
Payer: COMMERCIAL

## 2022-03-31 ENCOUNTER — IMMUNIZATION (OUTPATIENT)
Dept: NURSING | Facility: CLINIC | Age: 67
End: 2022-03-31
Payer: COMMERCIAL

## 2022-03-31 PROCEDURE — 0054A COVID-19,PF,PFIZER (12+ YRS): CPT

## 2022-03-31 PROCEDURE — 91305 COVID-19,PF,PFIZER (12+ YRS): CPT

## 2022-05-01 PROBLEM — M25.522 LEFT ELBOW PAIN: Status: RESOLVED | Noted: 2021-12-11 | Resolved: 2022-05-01

## 2022-05-01 NOTE — PROGRESS NOTES
Patient did not return to therapy.  Assume all goals were met to patient's satisfaction. Please refer to most recent progress note for objective measures. D/C from hand therapy.

## 2022-08-24 ENCOUNTER — MYC MEDICAL ADVICE (OUTPATIENT)
Dept: INTERNAL MEDICINE | Facility: CLINIC | Age: 67
End: 2022-08-24

## 2022-08-24 DIAGNOSIS — M79.662 PAIN OF LEFT LOWER LEG: ICD-10-CM

## 2022-08-26 RX ORDER — TRIAMCINOLONE ACETONIDE 0.1 %
PASTE (GRAM) DENTAL
Qty: 5 G | Refills: 1 | Status: SHIPPED | OUTPATIENT
Start: 2022-08-26 | End: 2023-06-07

## 2022-09-18 ENCOUNTER — HEALTH MAINTENANCE LETTER (OUTPATIENT)
Age: 67
End: 2022-09-18

## 2022-09-19 ENCOUNTER — MYC MEDICAL ADVICE (OUTPATIENT)
Dept: INTERNAL MEDICINE | Facility: CLINIC | Age: 67
End: 2022-09-19

## 2022-09-19 DIAGNOSIS — M25.552 HIP PAIN, LEFT: Primary | ICD-10-CM

## 2022-09-26 ENCOUNTER — HOSPITAL ENCOUNTER (OUTPATIENT)
Dept: PHYSICAL THERAPY | Facility: REHABILITATION | Age: 67
Discharge: HOME | End: 2022-09-26
Payer: COMMERCIAL

## 2022-09-26 DIAGNOSIS — M54.10 RADICULAR PAIN OF LEFT LOWER EXTREMITY: Primary | ICD-10-CM

## 2022-09-26 DIAGNOSIS — M25.552 LEFT HIP PAIN: ICD-10-CM

## 2022-09-26 DIAGNOSIS — M25.662 DECREASED RANGE OF MOTION OF LEFT LOWER EXTREMITY: ICD-10-CM

## 2022-09-26 DIAGNOSIS — M77.12 LATERAL EPICONDYLITIS OF LEFT ELBOW: ICD-10-CM

## 2022-09-26 PROCEDURE — 97140 MANUAL THERAPY 1/> REGIONS: CPT | Mod: GP | Performed by: PHYSICAL THERAPIST

## 2022-09-26 PROCEDURE — 97161 PT EVAL LOW COMPLEX 20 MIN: CPT | Mod: GP | Performed by: PHYSICAL THERAPIST

## 2022-09-26 PROCEDURE — 97110 THERAPEUTIC EXERCISES: CPT | Mod: GP | Performed by: PHYSICAL THERAPIST
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
## 2022-10-04 ENCOUNTER — HOSPITAL ENCOUNTER (OUTPATIENT)
Dept: PHYSICAL THERAPY | Facility: REHABILITATION | Age: 67
Discharge: HOME | End: 2022-10-04
Payer: COMMERCIAL

## 2022-10-04 DIAGNOSIS — M25.662 DECREASED RANGE OF MOTION OF LEFT LOWER EXTREMITY: ICD-10-CM

## 2022-10-04 DIAGNOSIS — M25.552 LEFT HIP PAIN: Primary | ICD-10-CM

## 2022-10-04 PROCEDURE — 97140 MANUAL THERAPY 1/> REGIONS: CPT | Mod: GP | Performed by: PHYSICAL THERAPIST

## 2022-10-04 PROCEDURE — 97110 THERAPEUTIC EXERCISES: CPT | Mod: GP | Performed by: PHYSICAL THERAPIST

## 2022-10-07 ENCOUNTER — HOSPITAL ENCOUNTER (OUTPATIENT)
Dept: PHYSICAL THERAPY | Facility: REHABILITATION | Age: 67
Discharge: HOME | End: 2022-10-07
Payer: COMMERCIAL

## 2022-10-07 DIAGNOSIS — M25.552 LEFT HIP PAIN: Primary | ICD-10-CM

## 2022-10-07 DIAGNOSIS — M25.662 DECREASED RANGE OF MOTION OF LEFT LOWER EXTREMITY: ICD-10-CM

## 2022-10-07 PROCEDURE — 97140 MANUAL THERAPY 1/> REGIONS: CPT | Mod: GP | Performed by: PHYSICAL THERAPIST

## 2022-10-07 PROCEDURE — 97110 THERAPEUTIC EXERCISES: CPT | Mod: GP | Performed by: PHYSICAL THERAPIST

## 2022-10-27 ENCOUNTER — OFFICE VISIT (OUTPATIENT)
Dept: INTERNAL MEDICINE | Facility: CLINIC | Age: 67
End: 2022-10-27
Payer: COMMERCIAL

## 2022-10-27 VITALS
SYSTOLIC BLOOD PRESSURE: 99 MMHG | HEART RATE: 69 BPM | TEMPERATURE: 98.6 F | WEIGHT: 138.3 LBS | BODY MASS INDEX: 21.71 KG/M2 | OXYGEN SATURATION: 99 % | HEIGHT: 67 IN | DIASTOLIC BLOOD PRESSURE: 67 MMHG

## 2022-10-27 DIAGNOSIS — Z00.00 WELLNESS EXAMINATION: ICD-10-CM

## 2022-10-27 DIAGNOSIS — C91.10 CHRONIC LYMPHOCYTIC LEUKEMIA (H): ICD-10-CM

## 2022-10-27 DIAGNOSIS — Z00.00 HEALTH MAINTENANCE EXAMINATION: Primary | ICD-10-CM

## 2022-10-27 DIAGNOSIS — Z78.0 MENOPAUSE: ICD-10-CM

## 2022-10-27 DIAGNOSIS — E78.2 MIXED HYPERLIPIDEMIA: ICD-10-CM

## 2022-10-27 DIAGNOSIS — Z00.00 ENCOUNTER FOR MEDICARE ANNUAL WELLNESS EXAM: ICD-10-CM

## 2022-10-27 DIAGNOSIS — R79.9 ABNORMAL FINDING OF BLOOD CHEMISTRY, UNSPECIFIED: ICD-10-CM

## 2022-10-27 LAB
ALBUMIN SERPL BCG-MCNC: 4.2 G/DL (ref 3.5–5.2)
ALP SERPL-CCNC: 69 U/L (ref 35–104)
ALT SERPL W P-5'-P-CCNC: 16 U/L (ref 10–35)
ANION GAP SERPL CALCULATED.3IONS-SCNC: 9 MMOL/L (ref 7–15)
AST SERPL W P-5'-P-CCNC: 29 U/L (ref 10–35)
BASOPHILS # BLD MANUAL: 0 10E3/UL (ref 0–0.2)
BASOPHILS NFR BLD MANUAL: 0 %
BILIRUB SERPL-MCNC: 0.4 MG/DL
BUN SERPL-MCNC: 18.8 MG/DL (ref 8–23)
CALCIUM SERPL-MCNC: 9.1 MG/DL (ref 8.8–10.2)
CHLORIDE SERPL-SCNC: 105 MMOL/L (ref 98–107)
CHOLEST SERPL-MCNC: 182 MG/DL
CREAT SERPL-MCNC: 0.84 MG/DL (ref 0.51–0.95)
DEPRECATED HCO3 PLAS-SCNC: 25 MMOL/L (ref 22–29)
EOSINOPHIL # BLD MANUAL: 0.2 10E3/UL (ref 0–0.7)
EOSINOPHIL NFR BLD MANUAL: 1 %
ERYTHROCYTE [DISTWIDTH] IN BLOOD BY AUTOMATED COUNT: 12.8 % (ref 10–15)
GFR SERPL CREATININE-BSD FRML MDRD: 75 ML/MIN/1.73M2
GLUCOSE SERPL-MCNC: 85 MG/DL (ref 70–99)
HCT VFR BLD AUTO: 42.2 % (ref 35–47)
HDLC SERPL-MCNC: 82 MG/DL
HGB BLD-MCNC: 13.3 G/DL (ref 11.7–15.7)
LDLC SERPL CALC-MCNC: 92 MG/DL
LYMPHOCYTES # BLD MANUAL: 12.1 10E3/UL (ref 0.8–5.3)
LYMPHOCYTES NFR BLD MANUAL: 78 %
MCH RBC QN AUTO: 29.8 PG (ref 26.5–33)
MCHC RBC AUTO-ENTMCNC: 31.5 G/DL (ref 31.5–36.5)
MCV RBC AUTO: 94 FL (ref 78–100)
MONOCYTES # BLD MANUAL: 1.1 10E3/UL (ref 0–1.3)
MONOCYTES NFR BLD MANUAL: 7 %
NEUTROPHILS # BLD MANUAL: 2.2 10E3/UL (ref 1.6–8.3)
NEUTROPHILS NFR BLD MANUAL: 14 %
NONHDLC SERPL-MCNC: 100 MG/DL
PLAT MORPH BLD: ABNORMAL
PLATELET # BLD AUTO: 217 10E3/UL (ref 150–450)
POTASSIUM SERPL-SCNC: 4 MMOL/L (ref 3.4–5.3)
PROT SERPL-MCNC: 6.4 G/DL (ref 6.4–8.3)
RBC # BLD AUTO: 4.47 10E6/UL (ref 3.8–5.2)
RBC MORPH BLD: ABNORMAL
SODIUM SERPL-SCNC: 139 MMOL/L (ref 136–145)
TRIGL SERPL-MCNC: 41 MG/DL
TSH SERPL DL<=0.005 MIU/L-ACNC: 1.78 UIU/ML (ref 0.3–4.2)
WBC # BLD AUTO: 15.5 10E3/UL (ref 4–11)

## 2022-10-27 PROCEDURE — 80053 COMPREHEN METABOLIC PANEL: CPT | Performed by: INTERNAL MEDICINE

## 2022-10-27 PROCEDURE — G0008 ADMIN INFLUENZA VIRUS VAC: HCPCS | Performed by: INTERNAL MEDICINE

## 2022-10-27 PROCEDURE — 84443 ASSAY THYROID STIM HORMONE: CPT | Performed by: INTERNAL MEDICINE

## 2022-10-27 PROCEDURE — 90662 IIV NO PRSV INCREASED AG IM: CPT | Performed by: INTERNAL MEDICINE

## 2022-10-27 PROCEDURE — G0438 PPPS, INITIAL VISIT: HCPCS | Performed by: INTERNAL MEDICINE

## 2022-10-27 PROCEDURE — 80061 LIPID PANEL: CPT | Performed by: INTERNAL MEDICINE

## 2022-10-27 PROCEDURE — 85025 COMPLETE CBC W/AUTO DIFF WBC: CPT | Performed by: INTERNAL MEDICINE

## 2022-10-27 PROCEDURE — 36415 COLL VENOUS BLD VENIPUNCTURE: CPT | Performed by: INTERNAL MEDICINE

## 2022-10-27 ASSESSMENT — ENCOUNTER SYMPTOMS
HEARTBURN: 0
DIARRHEA: 0
EYE PAIN: 0
NAUSEA: 0
HEADACHES: 0
SORE THROAT: 0
ARTHRALGIAS: 0
CONSTIPATION: 0
PARESTHESIAS: 0
NERVOUS/ANXIOUS: 0
FEVER: 0
JOINT SWELLING: 0
ABDOMINAL PAIN: 0
COUGH: 0
DIZZINESS: 0
PALPITATIONS: 0
HEMATOCHEZIA: 0
HEMATURIA: 0
WEAKNESS: 0
DYSURIA: 0
CHILLS: 0
FREQUENCY: 0
BREAST MASS: 0
MYALGIAS: 0

## 2022-10-27 ASSESSMENT — ACTIVITIES OF DAILY LIVING (ADL): CURRENT_FUNCTION: NO ASSISTANCE NEEDED

## 2022-10-27 NOTE — PATIENT INSTRUCTIONS
Vit d 2000 international unit(s) a day   Patient Education   Personalized Prevention Plan  You are due for the preventive services outlined below.  Your care team is available to assist you in scheduling these services.  If you have already completed any of these items, please share that information with your care team to update in your medical record.  Health Maintenance Due   Topic Date Due    COVID-19 Vaccine (5 - Booster for Moderna series) 05/26/2022    Flu Vaccine (1) 09/01/2022    ANNUAL REVIEW OF HM ORDERS  10/22/2022    Annual Wellness Visit  10/22/2022

## 2022-10-27 NOTE — PROGRESS NOTES
SUBJECTIVE:   Esther is a 67 year old very pleasant who presents for Preventive Visit.  She has CLL which is quiescent and stable on no treatment ,did recover from COVID uneventfully without taking any antivirals months ago.  Six Item Cognitive Impairment Test   (6CIT):      What year is it?                               Correct - 0 points    What month is it?                               Correct - 0 points      Give the patient an address to remember with five components:   Navdeep Spangler ( first and last name - 2 components)   [address]  (number and name of street - 2 components)   [place] ( city - 1 component)      About what time is it (within the hour)? Correct - 0 points    Count backwards from 20 to 1:   Correct - 0 points    Say the months of the year in reverse: Correct - 0 points    Repeat the address phrase:   1 error - 2 points    Total 6CIT Score:      1/28    Interpretation: The 6CIT uses an inverse score and questions are weighted to produce a total out of 28. Scores of 0-7 are considered normal and 8 or more significant.    Advantages The test has high sensitivity without compromising specificity even in mild dementia. It is easy to translate linguistically and culturally.  Disadvantages The main disadvantage is in the scoring and weighting of the test, which is initially confusing, however computer models have simplified this greatly.    Probability Statistics: At the 7/8 cut off: Overall figures sensitivity 90% specificity 100%, in mild dementia sensitivity = 78% , specificity = 100%    Copyright 2000 [facility], [facility], . Courtesy of Dr. Segundo Winslow      Positive covid septmeber   Current Outpatient Medications   Medication     acyclovir (ZOVIRAX) 400 MG tablet     calcium carbonate-vitamin D3 600 mg calcium- 200 unit cap     diclofenac sodium (VOLTAREN) 1 % Gel     EPINEPHrine (EPIPEN 2-DAVID) 0.3 mg/0.3 mL injection     ferrous sulfate 325 (65 FE) MG tablet      "fexofenadine (ALLEGRA ALLERGY) 180 MG tablet     fluticasone propionate (FLONASE) 50 mcg/actuation nasal spray     glucosam bernstein dip-chondroit-C-Mn 773-653-74-3 mg cap     multivitamin (ONE A DAY) per tablet     omega-3 acid ethyl esters (LOVAZA) 1 gram capsule     triamcinolone (KENALOG) 0.1 % paste     Current Facility-Administered Medications   Medication     lidocaine (PF) (XYLOCAINE) 1 % injection 0.5 mL     lidocaine (PF) (XYLOCAINE) 1 % injection 1 mL     methylPREDNISolone (DEPO-MEDROL) injection 20 mg     Colon 2008 no polyps 2019 negative next in 10 years   mammo every other year   dexa 2019   Pap 2019 normal .         Patient has been advised of split billing requirements and indicates understanding: Yes  Are you in the first 12 months of your Medicare coverage?  No    Healthy Habits:     In general, how would you rate your overall health?  Good    Frequency of exercise:  4-5 days/week    Duration of exercise:  Greater than 60 minutes    Do you usually eat at least 4 servings of fruit and vegetables a day, include whole grains    & fiber and avoid regularly eating high fat or \"junk\" foods?  Yes    Taking medications regularly:  Not Applicable    Medication side effects:  Not applicable    Ability to successfully perform activities of daily living:  No assistance needed    Home Safety:  No safety concerns identified    Hearing Impairment:  No hearing concerns    In the past 6 months, have you been bothered by leaking of urine?  No    In general, how would you rate your overall mental or emotional health?  Good      PHQ-2 Total Score: 0    Additional concerns today:  No    Do you feel safe in your environment? Yes    Have you ever done Advance Care Planning? (For example, a Health Directive, POLST, or a discussion with a medical provider or your loved ones about your wishes): Yes, advance care planning is on file.       Fall risk  Fallen 2 or more times in the past year?: No  Any fall with injury in the past " year?: No    Cognitive Screening completed see above    Do you have sleep apnea, excessive snoring or daytime drowsiness?: no    Reviewed and updated as needed this visit by clinical staff                  Reviewed and updated as needed this visit by Provider                 Social History     Tobacco Use     Smoking status: Never     Smokeless tobacco: Never   Substance Use Topics     Alcohol use: Yes     Alcohol/week: 7.0 standard drinks         Alcohol Use 10/27/2022   Prescreen: >3 drinks/day or >7 drinks/week? No               Current providers sharing in care for this patient include:   Patient Care Team:  Lashonda José MD as PCP - General  Lashonda José MD as Assigned PCP  Cassius Garsia MD as Assigned Cancer Care Provider  John Chaves DO as Assigned Musculoskeletal Provider  Lsahonda José as Referring Physician    The following health maintenance items are reviewed in Epic and correct as of today:  Health Maintenance   Topic Date Due     COVID-19 Vaccine (5 - Booster for Moderna series) 05/26/2022     INFLUENZA VACCINE (1) 09/01/2022     ANNUAL REVIEW OF HM ORDERS  10/22/2022     MEDICARE ANNUAL WELLNESS VISIT  10/22/2022     FALL RISK ASSESSMENT  10/27/2023     MAMMO SCREENING  01/06/2024     DTAP/TDAP/TD IMMUNIZATION (4 - Td or Tdap) 09/08/2024     LIPID  10/22/2026     ADVANCE CARE PLANNING  10/23/2026     COLORECTAL CANCER SCREENING  01/11/2028     DEXA  07/15/2034     HEPATITIS C SCREENING  Completed     PHQ-2 (once per calendar year)  Completed     Pneumococcal Vaccine: 65+ Years  Completed     ZOSTER IMMUNIZATION  Completed     HEPATITIS B IMMUNIZATION  Completed     IPV IMMUNIZATION  Aged Out     MENINGITIS IMMUNIZATION  Aged Out           Breast CA Risk Assessment (FHS-7) 10/27/2022   Do you have a family history of breast, colon, or ovarian cancer? No / Unknown           Pertinent mammograms are reviewed under the imaging tab.    Review of Systems   Constitutional: Negative for chills  "and fever.   HENT: Negative for congestion, ear pain, hearing loss and sore throat.    Eyes: Negative for pain and visual disturbance.   Respiratory: Negative for cough.    Cardiovascular: Negative for chest pain, palpitations and peripheral edema.   Gastrointestinal: Negative for abdominal pain, constipation, diarrhea, heartburn, hematochezia and nausea.   Breasts:  Negative for tenderness, breast mass and discharge.   Genitourinary: Negative for dysuria, frequency, genital sores, hematuria, pelvic pain, urgency, vaginal bleeding and vaginal discharge.   Musculoskeletal: Negative for arthralgias, joint swelling and myalgias.   Skin: Negative for rash.   Neurological: Negative for dizziness, weakness, headaches and paresthesias.   Psychiatric/Behavioral: Negative for mood changes. The patient is not nervous/anxious.      Patient Active Problem List   Diagnosis     Herpes labialis     Seasonal allergic rhinitis     Health maintenance examination     Chronic lymphocytic leukemia (H)     Esophageal reflux       no shortness of breath ,no  chest pain ,no  Falls, no urinary incontinence , no weight changes , sleep and moodhave been good . Independent in ADLS and IADLS       OBJECTIVE:   BP 99/67 (BP Location: Left arm, Patient Position: Sitting, Cuff Size: Adult Regular)   Pulse 69   Temp 98.6  F (37  C) (Tympanic)   Ht 1.689 m (5' 6.5\")   Wt 62.7 kg (138 lb 4.8 oz)   SpO2 99%   BMI 21.99 kg/m   Estimated body mass index is 21.99 kg/m  as calculated from the following:    Height as of this encounter: 1.689 m (5' 6.5\").    Weight as of this encounter: 62.7 kg (138 lb 4.8 oz).  Physical Exam  GENERAL: healthy, alert and no distress  EYES: Eyes grossly normal to inspection, PERRL and conjunctivae and sclerae normal  HENT: ear canals and TM's normal, nose and mouth without ulcers or lesions  NECK: no adenopathy, no asymmetry, masses, or scars and thyroid normal to palpation  RESP: lungs clear to auscultation - no " "rales, rhonchi or wheezes  BREAST: normal without masses, tenderness or nipple discharge and no palpable axillary masses or adenopathy  CV: regular rate and rhythm, normal S1 S2, no S3 or S4, no murmur, click or rub, no peripheral edema and peripheral pulses strong  ABDOMEN: soft, nontender, no hepatosplenomegaly, no masses and bowel sounds normal  MS: no gross musculoskeletal defects noted, no edema  SKIN: no suspicious lesions or rashes  NEURO: Normal strength and tone, mentation intact and speech normal  PSYCH: mentation appears normal, affect normal/bright    Romberg's negative heel lift normal    ASSESSMENT / PLAN:   (Z00.00) Health maintenance examination  (primary encounter diagnosis)  Comment:   Plan: Colon 2008 no polyps 2019 negative next in 10 years   mammo every other year   dexa 2019   Pap 2019 normal .   Immunization reviewed  We will get a repeat bone density scan.  (C91.10) Chronic lymphocytic leukemia (H)  Comment:   Plan: CBC with platelets, Comprehensive metabolic         panel        We will see Dr. Garsia later this year.    (Z00.00) Wellness examination  Comment:   Plan:     (Z00.00) Encounter for Medicare annual wellness exam  Comment:   Plan: Overall doing really well.  No concerns recovered uneventfully from COVID she can defer her booster shot to her later date we will get flu shot today.    (Z78.0) Menopause  Comment:   Plan: DX Hip/Pelvis/Spine            (R79.9) Abnormal finding of blood chemistry, unspecified  Comment: Talk about calcium and vitamin D.  Plan: Lipid panel reflex to direct LDL Fasting            (E78.2) Mixed hyperlipidemia  Comment:   Plan: Lipid panel reflex to direct LDL Fasting, TSH                    COUNSELING:  Reviewed preventive health counseling, as reflected in patient instructions    Estimated body mass index is 21.95 kg/m  as calculated from the following:    Height as of 10/19/20: 1.676 m (5' 6\").    Weight as of 12/7/21: 61.7 kg (136 lb).        She reports " that she has never smoked. She has never used smokeless tobacco.      Appropriate preventive services were discussed with this patient, including applicable screening as appropriate for cardiovascular disease, diabetes, osteopenia/osteoporosis, and glaucoma.  As appropriate for age/gender, discussed screening for colorectal cancer, prostate cancer, breast cancer, and cervical cancer. Checklist reviewing preventive services available has been given to the patient.    Reviewed patients plan of care and provided an AVS. The Basic Care Plan (routine screening as documented in Health Maintenance) for Esther meets the Care Plan requirement. This Care Plan has been established and reviewed with the Patient.    Counseling Resources:  ATP IV Guidelines  Pooled Cohorts Equation Calculator  Breast Cancer Risk Calculator  Breast Cancer: Medication to Reduce Risk  FRAX Risk Assessment  ICSI Preventive Guidelines  Dietary Guidelines for Americans, 2010  USDA's MyPlate  ASA Prophylaxis  Lung CA Screening    Lashonda José MD  [facility]    Identified Health Risks:

## 2022-10-28 ENCOUNTER — TELEPHONE (OUTPATIENT)
Dept: ONCOLOGY | Facility: HOSPITAL | Age: 67
End: 2022-10-28

## 2022-11-01 ENCOUNTER — TRANSFERRED RECORDS (OUTPATIENT)
Dept: HEALTH INFORMATION MANAGEMENT | Facility: CLINIC | Age: 67
End: 2022-11-01

## 2022-11-08 ENCOUNTER — HOSPITAL ENCOUNTER (OUTPATIENT)
Dept: PHYSICAL THERAPY | Facility: REHABILITATION | Age: 67
Discharge: HOME | End: 2022-11-08
Payer: COMMERCIAL

## 2022-11-08 DIAGNOSIS — M25.662 DECREASED RANGE OF MOTION OF LEFT LOWER EXTREMITY: ICD-10-CM

## 2022-11-08 DIAGNOSIS — M25.552 LEFT HIP PAIN: Primary | ICD-10-CM

## 2022-11-08 PROCEDURE — 97140 MANUAL THERAPY 1/> REGIONS: CPT | Mod: GP | Performed by: PHYSICAL THERAPIST

## 2022-11-08 PROCEDURE — 97110 THERAPEUTIC EXERCISES: CPT | Mod: GP | Performed by: PHYSICAL THERAPIST

## 2022-11-10 ENCOUNTER — ONCOLOGY VISIT (OUTPATIENT)
Dept: ONCOLOGY | Facility: HOSPITAL | Age: 67
End: 2022-11-10
Attending: NURSE PRACTITIONER
Payer: COMMERCIAL

## 2022-11-10 VITALS
TEMPERATURE: 98.6 F | HEART RATE: 70 BPM | OXYGEN SATURATION: 97 % | WEIGHT: 137.9 LBS | BODY MASS INDEX: 21.92 KG/M2 | RESPIRATION RATE: 18 BRPM | DIASTOLIC BLOOD PRESSURE: 62 MMHG | SYSTOLIC BLOOD PRESSURE: 102 MMHG

## 2022-11-10 DIAGNOSIS — C91.10 CHRONIC LYMPHOCYTIC LEUKEMIA (H): Primary | ICD-10-CM

## 2022-11-10 PROCEDURE — G0463 HOSPITAL OUTPT CLINIC VISIT: HCPCS

## 2022-11-10 PROCEDURE — 99214 OFFICE O/P EST MOD 30 MIN: CPT | Performed by: NURSE PRACTITIONER

## 2022-11-10 ASSESSMENT — PAIN SCALES - GENERAL: PAINLEVEL: NO PAIN (0)

## 2022-11-10 NOTE — PROGRESS NOTES
"Oncology Rooming Note    November 10, 2022 2:21 PM   Esther Prince is a 67 year old female who presents for:    Chief Complaint   Patient presents with     Oncology Clinic Visit     Chronic lymphocytic leukemia (H)     Initial Vitals: /62   Pulse 70   Temp 98.6  F (37  C)   Resp 18   Wt 62.6 kg (137 lb 14.4 oz)   SpO2 97%   BMI 21.92 kg/m   Estimated body mass index is 21.92 kg/m  as calculated from the following:    Height as of 10/27/22: 1.689 m (5' 6.5\").    Weight as of this encounter: 62.6 kg (137 lb 14.4 oz). Body surface area is 1.71 meters squared.  No Pain (0) Comment: Data Unavailable   No LMP recorded.  Allergies reviewed: Yes  Medications reviewed: Yes    Medications: Medication refills not needed today.  Pharmacy name entered into [employer]:    [id number] - [place], [place] - [address] [facility] - [place], [place] - [address]  [id number] - [place], [place] - [address]    Clinical concerns: wondering about testing for vit d. Levels.       Karolina Rivera LPN            "

## 2022-11-10 NOTE — PROGRESS NOTES
[facility] Hematology and Oncology Progress Note    Patient: Esther Prince  MRN: [id number]  Date of Service: Nov 10, 2022        Assessment and Plan:      1.  Chronic lymphocytic leukemia:   Absolute lymphocyte count is pretty stable 10-12K over last 3 yrs.   No cytopenias.   No symptoms from her CLL.      There is no indication for treatment at this time.  She has proven to have a very indolent course.    Plan:  -Return in 1 yr with CBC  -Call for reassessment sooner with new symptoms, reviewed these with patient    ECOG Performance  0    Diagnosis:    1.  Chronic lymphocytic leukemia: Diagnosed 2011.    Treatment:    Observation.    Interim History:    Esther returns today for a 1-year follow-up visit.  In general, she has been doing well with the past year.  No significant changes in her health.  She's very active and working towards a vegetarian diet.  No fevers, chills, night sweats, shortness of breath, abdominal pain, or unintentional weight loss.  No acute complaints today.      Physical Exam:    /62   Pulse 70   Temp 98.6  F (37  C)   Resp 18   Wt 62.6 kg (137 lb 14.4 oz)   SpO2 97%   BMI 21.92 kg/m      General: patient appears stated age of 67 year old. Nontoxic and in no distress.   HEENT: Head: atraumatic, normocephalic. Sclerae anicteric.  Lymph: No palpable cervical nor axillary adenopathy.  Chest:  Normal respiratory effort. Clear lung sounds bilaterally.  Cardiac:  No edema.   Abdomen: abdomen is non-distended. No splenomegaly.  Extremities: normal tone and muscle bulk.  Skin: no lesions or rash on visible skin. Warm and dry.   CNS: alert and oriented. Grossly non-focal.   Psychiatric: normal mood and affect.     Lab Results:    Hgb WNL  Platelets WNL  WBC 15.5/ ALC 12.1    Imaging:    No results found.     Total time 30 minutes, to include face to face visit, review of EMR, ordering, documentation and coordination of care on date of service      Signed by: Puja Ravi NP

## 2022-11-10 NOTE — LETTER
11/10/2022         RE: Esther Prince  [address]  [address]  [place] [address]        Dear Colleague,    Thank you for referring your patient, Esther Prince, to the [facility] [place]. Please see a copy of my visit note below.    [facility] Hematology and Oncology Progress Note    Patient: Esther Prince  MRN: [id number]  Date of Service: Nov 10, 2022        Assessment and Plan:      1.  Chronic lymphocytic leukemia:   Absolute lymphocyte count is pretty stable 10-12K over last 3 yrs.   No cytopenias.   No symptoms from her CLL.      There is no indication for treatment at this time.  She has proven to have a very indolent course.    Plan:  -Return in 1 yr with CBC  -Call for reassessment sooner with new symptoms, reviewed these with patient    ECOG Performance  0    Diagnosis:    1.  Chronic lymphocytic leukemia: Diagnosed 2011.    Treatment:    Observation.    Interim History:    Esther returns today for a 1-year follow-up visit.  In general, she has been doing well with the past year.  No significant changes in her health.  She's very active and working towards a vegetarian diet.  No fevers, chills, night sweats, shortness of breath, abdominal pain, or unintentional weight loss.  No acute complaints today.      Physical Exam:    /62   Pulse 70   Temp 98.6  F (37  C)   Resp 18   Wt 62.6 kg (137 lb 14.4 oz)   SpO2 97%   BMI 21.92 kg/m      General: patient appears stated age of 67 year old. Nontoxic and in no distress.   HEENT: Head: atraumatic, normocephalic. Sclerae anicteric.  Lymph: No palpable cervical nor axillary adenopathy.  Chest:  Normal respiratory effort. Clear lung sounds bilaterally.  Cardiac:  No edema.   Abdomen: abdomen is non-distended. No splenomegaly.  Extremities: normal tone and muscle bulk.  Skin: no lesions or rash on visible skin. Warm and dry.   CNS: alert and oriented. Grossly non-focal.   Psychiatric: normal mood and affect.     Lab  "Results:    Hgb WNL  Platelets WNL  WBC 15.5/ ALC 12.1    Imaging:    No results found.     Total time 30 minutes, to include face to face visit, review of EMR, ordering, documentation and coordination of care on date of service      Signed by: Puja Ravi NP      Oncology Rooming Note    November 10, 2022 2:21 PM   Esther Prince is a 67 year old female who presents for:    Chief Complaint   Patient presents with     Oncology Clinic Visit     Chronic lymphocytic leukemia (H)     Initial Vitals: /62   Pulse 70   Temp 98.6  F (37  C)   Resp 18   Wt 62.6 kg (137 lb 14.4 oz)   SpO2 97%   BMI 21.92 kg/m   Estimated body mass index is 21.92 kg/m  as calculated from the following:    Height as of 10/27/22: 1.689 m (5' 6.5\").    Weight as of this encounter: 62.6 kg (137 lb 14.4 oz). Body surface area is 1.71 meters squared.  No Pain (0) Comment: Data Unavailable   No LMP recorded.  Allergies reviewed: Yes  Medications reviewed: Yes    Medications: Medication refills not needed today.  Pharmacy name entered into [employer]:    [id number] - [place], [place] - [address] [facility] - [place], [place] - [address]  [id number] - [place], [place] - [address]    Clinical concerns: wondering about testing for vit d. Levels.       Karolina Rivera LPN                Again, thank you for allowing me to participate in the care of your patient.        Sincerely,        Puja Ravi NP    "

## 2022-11-15 ENCOUNTER — ANCILLARY PROCEDURE (OUTPATIENT)
Dept: BONE DENSITY | Facility: CLINIC | Age: 67
End: 2022-11-15
Attending: INTERNAL MEDICINE
Payer: COMMERCIAL

## 2022-11-15 DIAGNOSIS — Z78.0 MENOPAUSE: ICD-10-CM

## 2022-11-15 PROCEDURE — 77080 DXA BONE DENSITY AXIAL: CPT | Mod: TC | Performed by: RADIOLOGY

## 2022-11-21 ENCOUNTER — HOSPITAL ENCOUNTER (OUTPATIENT)
Dept: PHYSICAL THERAPY | Facility: REHABILITATION | Age: 67
Discharge: HOME | End: 2022-11-21
Payer: COMMERCIAL

## 2022-11-21 DIAGNOSIS — M25.662 DECREASED RANGE OF MOTION OF LEFT LOWER EXTREMITY: ICD-10-CM

## 2022-11-21 DIAGNOSIS — M25.552 LEFT HIP PAIN: Primary | ICD-10-CM

## 2022-11-21 PROCEDURE — 97140 MANUAL THERAPY 1/> REGIONS: CPT | Mod: GP | Performed by: PHYSICAL THERAPIST

## 2022-11-21 PROCEDURE — 97110 THERAPEUTIC EXERCISES: CPT | Mod: GP | Performed by: PHYSICAL THERAPIST

## 2022-11-21 NOTE — PROGRESS NOTES
[facility]    Outpatient Physical Therapy Discharge Note  Patient: Esther Prince  : 1955    Beginning/End Dates of Reporting Period:  22 to 22    Referring Provider: Dr. José    Therapy Diagnosis: L hip pain      Client Self Report: Had gotten better buy hip pain has leveled off.  As day goes on starts to hurts from time to time.  Self-mob has been helpful - if she does that she sleeps better and has less pain.    Objective Measurements:  Objective Measure: Counterstrain: positive LF and lymph/venous scans     Objective Measure: palpation: pain to lateral greater trochanter.  Pain and tightness in inguinal ligament - proximal rectus femoris                                 Outcome Measures (most recent score):  LEFS improved from 63 yo 73     Goals:  Goal Identifier HEP   Goal Description Pt will be independent with HEP to manage symtpoms   Target Date 22   Date Met  22   Progress (detail required for progress note): continuing to progress as appropriate - limit # of exercises as pt is already very active     Goal Identifier Sleeping on left side   Goal Description Pt will sleep on left side without pain to allow for full night sleep   Target Date 22   Date Met      Progress (detail required for progress note): unable to sleep on L side - will try from time to time.  But overall less difficulty sleeping.     Goal Identifier Sitting for prolonged period   Goal Description Pt can sit for 2 hours without pain >1/10 or need to stand up.   Target Date 22   Date Met      Progress (detail required for progress note): Pt can sit for 45 min before she starts getting uncomfortable.     Goal Identifier     Goal Description     Target Date     Date Met      Progress (detail required for progress note):       Goal Identifier     Goal Description     Target Date     Date Met       Progress (detail required for progress note):       Goal Identifier     Goal Description     Target Date     Date Met      Progress (detail required for progress note):       Goal Identifier     Goal Description     Target Date     Date Met      Progress (detail required for progress note):       Goal Identifier     Goal Description     Target Date     Date Met      Progress (detail required for progress note):             Plan:  Discharge from therapy.    Discharge:    Reason for Discharge: Patient has met all goals or is progressing.    Equipment Issued: [email or web address]     Discharge Plan: Patient to continue home program.

## 2022-11-21 NOTE — ADDENDUM NOTE
Encounter addended by: Natalia Hunter, PT on: 11/21/2022 8:02 AM   Actions taken: Charge Capture section accepted, Episode resolved

## 2023-02-15 ENCOUNTER — ANCILLARY PROCEDURE (OUTPATIENT)
Dept: MAMMOGRAPHY | Facility: CLINIC | Age: 68
End: 2023-02-15
Attending: INTERNAL MEDICINE
Payer: COMMERCIAL

## 2023-02-15 DIAGNOSIS — Z12.31 VISIT FOR SCREENING MAMMOGRAM: ICD-10-CM

## 2023-02-15 PROCEDURE — 77067 SCR MAMMO BI INCL CAD: CPT | Mod: TC | Performed by: RADIOLOGY

## 2023-02-15 PROCEDURE — 77063 BREAST TOMOSYNTHESIS BI: CPT | Mod: TC | Performed by: RADIOLOGY

## 2023-03-16 DIAGNOSIS — B00.1 HERPES LABIALIS: ICD-10-CM

## 2023-03-16 RX ORDER — ACYCLOVIR 400 MG/1
400 TABLET ORAL 2 TIMES DAILY
Qty: 60 TABLET | Refills: 3 | Status: SHIPPED | OUTPATIENT
Start: 2023-03-16 | End: 2023-04-05

## 2023-04-05 DIAGNOSIS — B00.1 HERPES LABIALIS: ICD-10-CM

## 2023-04-05 RX ORDER — ACYCLOVIR 400 MG/1
400 TABLET ORAL 2 TIMES DAILY
Qty: 180 TABLET | Refills: 3 | Status: SHIPPED | OUTPATIENT
Start: 2023-04-05 | End: 2023-12-13

## 2023-04-05 NOTE — TELEPHONE ENCOUNTER
Patient is requesting a 90 day supply - recent Rx sent was only 30 day     Rx pended for approval if agreeable

## 2023-06-07 DIAGNOSIS — M79.662 PAIN OF LEFT LOWER LEG: ICD-10-CM

## 2023-06-07 RX ORDER — TRIAMCINOLONE ACETONIDE 0.1 %
PASTE (GRAM) DENTAL
Qty: 5 G | Refills: 1 | Status: SHIPPED | OUTPATIENT
Start: 2023-06-07 | End: 2023-12-13

## 2023-07-12 ENCOUNTER — MYC MEDICAL ADVICE (OUTPATIENT)
Dept: INTERNAL MEDICINE | Facility: CLINIC | Age: 68
End: 2023-07-12
Payer: COMMERCIAL

## 2023-08-14 ENCOUNTER — MYC MEDICAL ADVICE (OUTPATIENT)
Dept: INTERNAL MEDICINE | Facility: CLINIC | Age: 68
End: 2023-08-14
Payer: COMMERCIAL

## 2023-08-14 DIAGNOSIS — J40 BRONCHITIS: Primary | ICD-10-CM

## 2023-08-17 ENCOUNTER — MYC MEDICAL ADVICE (OUTPATIENT)
Dept: INTERNAL MEDICINE | Facility: CLINIC | Age: 68
End: 2023-08-17
Payer: COMMERCIAL

## 2023-08-17 DIAGNOSIS — E78.2 MIXED HYPERLIPIDEMIA: Primary | ICD-10-CM

## 2023-08-17 DIAGNOSIS — R79.9 ABNORMAL BLOOD CHEMISTRY: ICD-10-CM

## 2023-08-17 DIAGNOSIS — E55.9 VITAMIN D INSUFFICIENCY: ICD-10-CM

## 2023-08-18 ENCOUNTER — NURSE TRIAGE (OUTPATIENT)
Dept: INTERNAL MEDICINE | Facility: CLINIC | Age: 68
End: 2023-08-18

## 2023-08-18 ENCOUNTER — OFFICE VISIT (OUTPATIENT)
Dept: FAMILY MEDICINE | Facility: CLINIC | Age: 68
End: 2023-08-18
Payer: COMMERCIAL

## 2023-08-18 VITALS
TEMPERATURE: 97.6 F | BODY MASS INDEX: 22.26 KG/M2 | DIASTOLIC BLOOD PRESSURE: 60 MMHG | OXYGEN SATURATION: 99 % | SYSTOLIC BLOOD PRESSURE: 103 MMHG | HEIGHT: 66 IN | RESPIRATION RATE: 16 BRPM | HEART RATE: 58 BPM

## 2023-08-18 DIAGNOSIS — R53.83 OTHER FATIGUE: ICD-10-CM

## 2023-08-18 DIAGNOSIS — R05.2 SUBACUTE COUGH: Primary | ICD-10-CM

## 2023-08-18 DIAGNOSIS — C91.10 CLL (CHRONIC LYMPHOCYTIC LEUKEMIA) (H): ICD-10-CM

## 2023-08-18 DIAGNOSIS — J02.9 SORE THROAT: ICD-10-CM

## 2023-08-18 LAB
ERYTHROCYTE [DISTWIDTH] IN BLOOD BY AUTOMATED COUNT: 12.6 % (ref 10–15)
HCT VFR BLD AUTO: 40.4 % (ref 35–47)
HGB BLD-MCNC: 12.8 G/DL (ref 11.7–15.7)
MCH RBC QN AUTO: 29.6 PG (ref 26.5–33)
MCHC RBC AUTO-ENTMCNC: 31.7 G/DL (ref 31.5–36.5)
MCV RBC AUTO: 93 FL (ref 78–100)
PLATELET # BLD AUTO: 188 10E3/UL (ref 150–450)
RBC # BLD AUTO: 4.33 10E6/UL (ref 3.8–5.2)
WBC # BLD AUTO: 17.4 10E3/UL (ref 4–11)

## 2023-08-18 PROCEDURE — 85027 COMPLETE CBC AUTOMATED: CPT | Performed by: FAMILY MEDICINE

## 2023-08-18 PROCEDURE — 36415 COLL VENOUS BLD VENIPUNCTURE: CPT | Performed by: FAMILY MEDICINE

## 2023-08-18 PROCEDURE — 99214 OFFICE O/P EST MOD 30 MIN: CPT | Performed by: FAMILY MEDICINE

## 2023-08-18 PROCEDURE — 87635 SARS-COV-2 COVID-19 AMP PRB: CPT | Performed by: FAMILY MEDICINE

## 2023-08-18 RX ORDER — PREDNISONE 20 MG/1
20 TABLET ORAL DAILY
Qty: 5 TABLET | Refills: 0 | Status: SHIPPED | OUTPATIENT
Start: 2023-08-18 | End: 2023-08-29

## 2023-08-18 ASSESSMENT — PATIENT HEALTH QUESTIONNAIRE - PHQ9
SUM OF ALL RESPONSES TO PHQ QUESTIONS 1-9: 10
10. IF YOU CHECKED OFF ANY PROBLEMS, HOW DIFFICULT HAVE THESE PROBLEMS MADE IT FOR YOU TO DO YOUR WORK, TAKE CARE OF THINGS AT HOME, OR GET ALONG WITH OTHER PEOPLE: NOT DIFFICULT AT ALL
SUM OF ALL RESPONSES TO PHQ QUESTIONS 1-9: 10

## 2023-08-18 NOTE — PROGRESS NOTES
Assessment & Plan     Subacute cough / Other fatigue / Sore throat  Esther has been treated twice with antibiotics, first doxycycline, then Augmentin.  Slight moderating with antibiotics but then symptoms come back.  Current symptoms are mainly frequent dry cough, sore throat, and fatigue.  Her primary care provider Dr. José responded to her message this morning by giving her a trial of prednisone 20 mg a day for 5 days to treat possible wheezy bronchitis.  - Symptomatic COVID-19 Virus (Coronavirus) by PCR Nasopharyngeal  - CT Chest w/o Contrast      CLL (chronic lymphocytic leukemia) (H)  Just make sure she is not having a flare   - CBC with platelets  Lab Results   Component Value Date    WBC 17.4 08/18/2023     Lab Results   Component Value Date    RBC 4.33 08/18/2023     Lab Results   Component Value Date    HGB 12.8 08/18/2023     Lab Results   Component Value Date    HCT 40.4 08/18/2023     No components found for: MCT  Lab Results   Component Value Date    MCV 93 08/18/2023     Lab Results   Component Value Date    MCH 29.6 08/18/2023     Lab Results   Component Value Date    MCHC 31.7 08/18/2023     Lab Results   Component Value Date    RDW 12.6 08/18/2023     Lab Results   Component Value Date     08/18/2023       Return for pending test results.        Ria Tsang MD  [facility]   Esther is a 68 year old, presenting for the following health issues:  office visit and Recheck Medication (Pt reports that she has not been feeling good for about 2 weeks. Pt  has a sore throat, fatigued, hurts to swallow, cough.okay appetite. Pt also reports that left ear has pain as well.)      8/18/2023     1:06 PM   Additional Questions   Roomed by floridalma   Accompanied by          8/18/2023     1:06 PM   Patient Reported Additional Medications   Patient reports taking the following new medications none       History of Present Illness       Reason for visit:   "Cough, sore throat, fatigue , since July 2. Treated for bronchitis    She eats 4 or more servings of fruits and vegetables daily.She consumes 0 sweetened beverage(s) daily.She exercises with enough effort to increase her heart rate 30 to 60 minutes per day.  She exercises with enough effort to increase her heart rate 6 days per week.   She is taking medications regularly.      got something early July -Esther picked it up. Many negative home  tests, and negative PCR for  .  recovered from his illness and Esther started to get worse - July 7 got bad and couldn't sleep through night due to coughing.     They  to a clinic in [place] July 14 - provider at the clinic did CXR - diagnosis  bronchitis and an ear infection, treated with doxycycline. Got a little better -\"I could sleep in a bed.\" Cough medicine and an inhaler helped - inhaler made her jittery    Got home 3rd week in July. Went to [facility] urgent care July 29th - - CXR  normal/   PA thought bronchitis - Augmentin BID 10 days - finished August 9th . Eshter felt a little better on antibiotics, but once she was done she felt worse again    What is better? - can sleep through the night propped  What is not better?  - when it starts getting worse again throat is sore and it hurts to swallow - no energy - then ear starts to hurt with swallowing    ROS  -  dry cough - except a little yesterday   - chest feels heavy   - very tired   - \"I think my respiration is faster than usual\"   - both times providers have heard something on exam   - no smoking   - feels shaky though not visibly shaky    Has had Covid in the past and has been completely vaccinated      Ongoing issues: Has CLL - has not caused issues for ever. Usual wbc count 15 -19          Objective    /60 (BP Location: Left arm, Patient Position: Sitting, Cuff Size: Adult Regular)   Pulse 58   Temp 97.6  F (36.4  C) (Tympanic)   Resp 16   Ht 1.676 m (5' 6\")   LMP  (LMP Unknown)  "  SpO2 99%   Breastfeeding No   BMI 22.26 kg/m    Body mass index is 22.26 kg/m .  Physical Exam   General appearance - tired, frequent dry cough  Mental status - normal mood, behavior, speech, dress, motor activity, and thought processes  Eyes - sclera anicteric, conjunctiva clear  Ears - bilateral TM's and external ear canals normal  Mouth - mucous membranes moist, pharynx normal without lesions and tonsils normal  Neck - adenopathy noted small left anterior only  Chest - clear to auscultation, no wheezes, rales or rhonchi, symmetric air entry  Heart - normal rate, regular rhythm, normal S1, S2, no murmurs, rubs, clicks or gallops  Abdomen - soft, no organomegaly, normal bowel sounds, very mild RLQ tenderness

## 2023-08-18 NOTE — TELEPHONE ENCOUNTER
Nurse Triage SBAR    Is this a 2nd Level Triage? No.    Situation: Patient is having a really bad sore throat.    Background: Patient was seen at urgent care on 07/29/2023. Covid tests at home have been negative.     Assessment: Patient has been feeling ill since July 2nd. Patient has been coughing up some green phlegm and having post nasal drip. Patient able to eat/drink normally. No fevers. No breathing difficulties. Patient reports feel shaky at times. Patient currently out of town with , but will be back in the cities this afternoon.    Protocol Recommended Disposition:   See in Office Today    Recommendation: Please plan to go to your appointment today. If you are unable to make your appointment, please head to your local urgent care.     Routed to provider    Does the patient meet one of the following criteria for ADS visit consideration? 16+ years old, with an [facility] PCP     TIP  Providers, please consider if this condition is appropriate for management at one of our Acute and Diagnostic Services sites.     If patient is a good candidate, please use dotphrase <dot>triageresponse and select Refer to ADS to document.  Reason for Disposition   SEVERE sore throat pain    Additional Information   Negative: SEVERE difficulty breathing (e.g., struggling for each breath, speaks in single words)   Negative: Sounds like a life-threatening emergency to the triager   Negative: Throat culture results, call about   Negative: Productive cough is main symptom   Negative: Runny nose is main symptom   Negative: Drooling or spitting out saliva (because can't swallow)   Negative: Unable to open mouth completely   Negative: Drinking very little and has signs of dehydration (e.g., no urine > 12 hours, very dry mouth, very lightheaded)   Negative: Patient sounds very sick or weak to the triager   Negative: Difficulty breathing (per caller) but not severe   Negative: Fever > 103 F (39.4 C)   Negative: Refuses to drink anything  "for > 12 hours    Answer Assessment - Initial Assessment Questions  1. ONSET: \"When did the throat start hurting?\" (Hours or days ago)       July 2nd  2. SEVERITY: \"How bad is the sore throat?\" (Scale 1-10; mild, moderate or severe)    - MILD (1-3):  doesn't interfere with eating or normal activities    - MODERATE (4-7): interferes with eating some solids and normal activities    - SEVERE (8-10):  excruciating pain, interferes with most normal activities    - SEVERE DYSPHAGIA: can't swallow liquids, drooling      6  3. STREP EXPOSURE: \"Has there been any exposure to strep within the past week?\" If Yes, ask: \"What type of contact occurred?\"       No.  4.  VIRAL SYMPTOMS: \"Are there any symptoms of a cold, such as a runny nose, cough, hoarse voice or red eyes?\"       Cough, post nasal drip,  5. FEVER: \"Do you have a fever?\" If Yes, ask: \"What is your temperature, how was it measured, and when did it start?\"      No.  6. PUS ON THE TONSILS: \"Is there pus on the tonsils in the back of your throat?\"      No.  7. OTHER SYMPTOMS: \"Do you have any other symptoms?\" (e.g., difficulty breathing, headache, rash)      No.  8. PREGNANCY: \"Is there any chance you are pregnant?\" \"When was your last menstrual period?\"      N/A    Protocols used: Sore Throat-A-OH    "

## 2023-08-19 LAB — SARS-COV-2 RNA RESP QL NAA+PROBE: NEGATIVE

## 2023-08-22 ENCOUNTER — HOSPITAL ENCOUNTER (OUTPATIENT)
Dept: CT IMAGING | Facility: HOSPITAL | Age: 68
Discharge: HOME | End: 2023-08-22
Attending: FAMILY MEDICINE | Admitting: FAMILY MEDICINE
Payer: COMMERCIAL

## 2023-08-22 DIAGNOSIS — R05.2 SUBACUTE COUGH: ICD-10-CM

## 2023-08-22 PROCEDURE — 71250 CT THORAX DX C-: CPT

## 2023-08-29 ENCOUNTER — OFFICE VISIT (OUTPATIENT)
Dept: INTERNAL MEDICINE | Facility: CLINIC | Age: 68
End: 2023-08-29
Payer: COMMERCIAL

## 2023-08-29 VITALS
HEART RATE: 68 BPM | OXYGEN SATURATION: 99 % | DIASTOLIC BLOOD PRESSURE: 60 MMHG | RESPIRATION RATE: 11 BRPM | SYSTOLIC BLOOD PRESSURE: 120 MMHG | TEMPERATURE: 98.1 F

## 2023-08-29 DIAGNOSIS — R79.9 ABNORMAL BLOOD CHEMISTRY: ICD-10-CM

## 2023-08-29 DIAGNOSIS — R09.82 POSTNASAL DRIP: ICD-10-CM

## 2023-08-29 DIAGNOSIS — Z00.00 HEALTH MAINTENANCE EXAMINATION: Primary | ICD-10-CM

## 2023-08-29 DIAGNOSIS — E78.2 MIXED HYPERLIPIDEMIA: ICD-10-CM

## 2023-08-29 DIAGNOSIS — J37.0 CHRONIC LARYNGITIS: ICD-10-CM

## 2023-08-29 DIAGNOSIS — C91.10 CHRONIC LYMPHOCYTIC LEUKEMIA (H): ICD-10-CM

## 2023-08-29 DIAGNOSIS — E55.9 VITAMIN D INSUFFICIENCY: ICD-10-CM

## 2023-08-29 LAB
ALBUMIN SERPL BCG-MCNC: 4.3 G/DL (ref 3.5–5.2)
ALP SERPL-CCNC: 67 U/L (ref 35–104)
ALT SERPL W P-5'-P-CCNC: 12 U/L (ref 0–50)
ANION GAP SERPL CALCULATED.3IONS-SCNC: 11 MMOL/L (ref 7–15)
AST SERPL W P-5'-P-CCNC: 29 U/L (ref 0–45)
BILIRUB SERPL-MCNC: 0.4 MG/DL
BUN SERPL-MCNC: 15.1 MG/DL (ref 8–23)
CALCIUM SERPL-MCNC: 9.5 MG/DL (ref 8.8–10.2)
CHLORIDE SERPL-SCNC: 105 MMOL/L (ref 98–107)
CHOLEST SERPL-MCNC: 205 MG/DL
CREAT SERPL-MCNC: 0.97 MG/DL (ref 0.51–0.95)
DEPRECATED HCO3 PLAS-SCNC: 24 MMOL/L (ref 22–29)
GFR SERPL CREATININE-BSD FRML MDRD: 63 ML/MIN/1.73M2
GLUCOSE SERPL-MCNC: 81 MG/DL (ref 70–99)
HBA1C MFR BLD: 5.9 % (ref 0–5.6)
HDLC SERPL-MCNC: 77 MG/DL
LDLC SERPL CALC-MCNC: 116 MG/DL
NONHDLC SERPL-MCNC: 128 MG/DL
POTASSIUM SERPL-SCNC: 4.2 MMOL/L (ref 3.4–5.3)
PROT SERPL-MCNC: 6.6 G/DL (ref 6.4–8.3)
SODIUM SERPL-SCNC: 140 MMOL/L (ref 136–145)
TRIGL SERPL-MCNC: 59 MG/DL
TSH SERPL DL<=0.005 MIU/L-ACNC: 1.24 UIU/ML (ref 0.3–4.2)

## 2023-08-29 PROCEDURE — 36415 COLL VENOUS BLD VENIPUNCTURE: CPT | Performed by: INTERNAL MEDICINE

## 2023-08-29 PROCEDURE — 80061 LIPID PANEL: CPT | Performed by: INTERNAL MEDICINE

## 2023-08-29 PROCEDURE — 99214 OFFICE O/P EST MOD 30 MIN: CPT | Performed by: INTERNAL MEDICINE

## 2023-08-29 PROCEDURE — 83036 HEMOGLOBIN GLYCOSYLATED A1C: CPT | Performed by: INTERNAL MEDICINE

## 2023-08-29 PROCEDURE — 80053 COMPREHEN METABOLIC PANEL: CPT | Performed by: INTERNAL MEDICINE

## 2023-08-29 PROCEDURE — 84443 ASSAY THYROID STIM HORMONE: CPT | Performed by: INTERNAL MEDICINE

## 2023-08-29 PROCEDURE — 82306 VITAMIN D 25 HYDROXY: CPT | Performed by: INTERNAL MEDICINE

## 2023-08-29 RX ORDER — FEXOFENADINE HCL AND PSEUDOEPHEDRINE HCL 180; 240 MG/1; MG/1
1 TABLET, EXTENDED RELEASE ORAL DAILY
Qty: 314 TABLET | Refills: 1 | Status: SHIPPED | OUTPATIENT
Start: 2023-08-29

## 2023-08-29 ASSESSMENT — PAIN SCALES - GENERAL: PAINLEVEL: SEVERE PAIN (6)

## 2023-08-29 NOTE — PROGRESS NOTES
Assessment & Plan     Health maintenance examination    Colon 2008 no polyps 2019 negative next in 10 years   mammo every other year   dexa 2019 , 2022  Pap 2019 normal .     Chronic lymphocytic leukemia (H)  Stable white count with no systemic signs or overt lymphadenopathy     Chronic laryngitis  For more than 4 weeks and getting worse . Could be due to postnasal drip.  Unlikely laryngeal esophageal reflux/are a separate laryngitis that she may have acquired with a laryngeal polyp.  Recommend antihistamine with the Flonase salt water gargles conservative care and ENT check if not improved in another few weeks.  - Adult ENT  Referral; Future    Postnasal drip  Postviral pneumonia, bronchiectasis significant bronchitis and other chronic lung disease ruled out by CT scan chest.  Because she has clear drainage even with the Jackie pot unlikely to be a bacterial or chronic sinusitis.  Recommend adding Allegra-D for his stronger decongestant did not support short-term use and then persisting with Allegra plain and again if no improvement consider ENT for nasopharyngoscopy.  - fexofenadine-pseudoePHEDrine (ALLEGRA-D 24) 180-240 MG 24 hr tablet; Take 1 tablet by mouth daily    Mixed hyperlipidemia    - Comprehensive metabolic panel  - Lipid panel reflex to direct LDL Fasting  - TSH    Abnormal blood chemistry    - Vitamin D Deficiency  - Hemoglobin A1c    Vitamin D insufficiency    - Vitamin D Deficiency      Labs reviewed vaccinations reviewed health maintenance reviewed.           Lashonda José MD  [facility]    Felix Santana is a 68 year old, presenting for the following health issues:  8 weeks go   had hacking cough covid ngative then she got it , was given doxycyline in [place] ( was traveling )  ,took for  ten days . Chest xray negative for pneumonia ut reported as bronchitis , started to get worse , another chest xray at urgent care in [place] ,  treated with augmentin and inhlaers that helped for a little wile , then prednisone was given  . She feels otherwise fine but has persistent hoarseness and post nasal drip ,  hurts to swaloow , fullness in back of mouth lieall wose when she lies down . Doing flonase , ketotifn eye drops , no trouble breathing   OFFICE VISIT  and Recheck Medication (Pt is here due to some symptoms losing voice , sore throat and headache  )      8/29/2023     8:00 AM   Additional Questions   Roomed by ermelinda   Accompanied by spouse         8/29/2023     8:00 AM   Patient Reported Additional Medications   Patient reports taking the following new medications no       HPI   Current Outpatient Medications   Medication    acyclovir (ZOVIRAX) 400 MG tablet    calcium carbonate-vitamin D3 600 mg calcium- 200 unit cap    diclofenac sodium (VOLTAREN) 1 % Gel    EPINEPHrine (EPIPEN 2-DAVID) 0.3 mg/0.3 mL injection    ferrous sulfate 325 (65 FE) MG tablet            fluticasone propionate (FLONASE) 50 mcg/actuation nasal spray    glucosam bernstein dip-chondroit-C-Mn 900-899-99-3 mg cap    multivitamin (ONE A DAY) per tablet    omega-3 acid ethyl esters (LOVAZA) 1 gram capsule    triamcinolone (KENALOG) 0.1 % paste     Current Facility-Administered Medications   Medication    lidocaine (PF) (XYLOCAINE) 1 % injection 0.5 mL    lidocaine (PF) (XYLOCAINE) 1 % injection 1 mL    methylPREDNISolone (DEPO-MEDROL) injection 20 mg       Pt is here due to cold and bronchitis symptoms          Review of Systems         Objective    /60 (BP Location: Left arm, Patient Position: Sitting, Cuff Size: Adult Regular)   Pulse 68   Temp 98.1  F (36.7  C) (Oral)   Resp 11   LMP  (LMP Unknown)   SpO2 99%   There is no height or weight on file to calculate BMI.  Physical Exam   GENERAL: healthy, alert and no distress  HENT: normal cephalic/atraumatic, ear canals and some wax TM's normal, nose and mouth without ulcers or lesions, oropharynx clear, and oral  mucous membranes moist  NECK: no adenopathy, no asymmetry, masses, or scars and thyroid normal to palpation  RESP: lungs clear to auscultation - no rales, rhonchi or wheezes  CV: regular rate and rhythm, normal S1 S2, no S3 or S4, no murmur, click or rub, no peripheral edema and peripheral pulses strong  ABDOMEN: soft, nontender, no hepatosplenomegaly, no masses and bowel sounds normal  MS: no gross musculoskeletal defects noted, no edema

## 2023-08-30 LAB — DEPRECATED CALCIDIOL+CALCIFEROL SERPL-MC: 40 UG/L (ref 20–75)

## 2023-09-11 ENCOUNTER — TRANSFERRED RECORDS (OUTPATIENT)
Dept: HEALTH INFORMATION MANAGEMENT | Facility: CLINIC | Age: 68
End: 2023-09-11
Payer: COMMERCIAL

## 2023-09-27 ENCOUNTER — PATIENT OUTREACH (OUTPATIENT)
Dept: CARE COORDINATION | Facility: CLINIC | Age: 68
End: 2023-09-27
Payer: COMMERCIAL

## 2023-09-28 ENCOUNTER — MYC MEDICAL ADVICE (OUTPATIENT)
Dept: INTERNAL MEDICINE | Facility: CLINIC | Age: 68
End: 2023-09-28
Payer: COMMERCIAL

## 2023-09-28 DIAGNOSIS — Z23 NEED FOR TDAP VACCINATION: ICD-10-CM

## 2023-09-28 DIAGNOSIS — Z29.89 NEED FOR MALARIA PROPHYLAXIS: Primary | ICD-10-CM

## 2023-09-29 RX ORDER — AZITHROMYCIN 250 MG/1
TABLET, FILM COATED ORAL
Qty: 6 TABLET | Refills: 0 | Status: SHIPPED | OUTPATIENT
Start: 2023-09-29 | End: 2023-10-04

## 2023-09-29 RX ORDER — ATOVAQUONE AND PROGUANIL HYDROCHLORIDE 250; 100 MG/1; MG/1
1 TABLET, FILM COATED ORAL DAILY
Qty: 50 TABLET | Refills: 0 | Status: SHIPPED | OUTPATIENT
Start: 2023-09-29 | End: 2023-12-13

## 2023-10-11 ENCOUNTER — PATIENT OUTREACH (OUTPATIENT)
Dept: CARE COORDINATION | Facility: CLINIC | Age: 68
End: 2023-10-11
Payer: COMMERCIAL

## 2023-10-14 ENCOUNTER — MYC MEDICAL ADVICE (OUTPATIENT)
Dept: INTERNAL MEDICINE | Facility: CLINIC | Age: 68
End: 2023-10-14
Payer: COMMERCIAL

## 2023-12-05 ENCOUNTER — TRANSFERRED RECORDS (OUTPATIENT)
Dept: HEALTH INFORMATION MANAGEMENT | Facility: CLINIC | Age: 68
End: 2023-12-05
Payer: COMMERCIAL

## 2023-12-11 ENCOUNTER — TRANSFERRED RECORDS (OUTPATIENT)
Dept: HEALTH INFORMATION MANAGEMENT | Facility: CLINIC | Age: 68
End: 2023-12-11
Payer: COMMERCIAL

## 2023-12-13 ENCOUNTER — OFFICE VISIT (OUTPATIENT)
Dept: INTERNAL MEDICINE | Facility: CLINIC | Age: 68
End: 2023-12-13
Payer: COMMERCIAL

## 2023-12-13 VITALS
RESPIRATION RATE: 16 BRPM | HEART RATE: 61 BPM | HEIGHT: 66 IN | TEMPERATURE: 97.7 F | SYSTOLIC BLOOD PRESSURE: 113 MMHG | DIASTOLIC BLOOD PRESSURE: 60 MMHG | WEIGHT: 136.1 LBS | OXYGEN SATURATION: 99 % | BODY MASS INDEX: 21.87 KG/M2

## 2023-12-13 DIAGNOSIS — M79.662 PAIN OF LEFT LOWER LEG: ICD-10-CM

## 2023-12-13 DIAGNOSIS — B00.1 HERPES LABIALIS: ICD-10-CM

## 2023-12-13 DIAGNOSIS — Z00.00 ENCOUNTER FOR MEDICARE ANNUAL WELLNESS EXAM: ICD-10-CM

## 2023-12-13 DIAGNOSIS — K21.9 GASTROESOPHAGEAL REFLUX DISEASE WITHOUT ESOPHAGITIS: ICD-10-CM

## 2023-12-13 DIAGNOSIS — C91.10 CHRONIC LYMPHOCYTIC LEUKEMIA (H): ICD-10-CM

## 2023-12-13 DIAGNOSIS — Z00.00 HEALTH MAINTENANCE EXAMINATION: Primary | ICD-10-CM

## 2023-12-13 LAB
ANION GAP SERPL CALCULATED.3IONS-SCNC: 8 MMOL/L (ref 7–15)
BASOPHILS # BLD AUTO: ABNORMAL 10*3/UL
BASOPHILS # BLD MANUAL: 0 10E3/UL (ref 0–0.2)
BASOPHILS NFR BLD AUTO: ABNORMAL %
BASOPHILS NFR BLD MANUAL: 0 %
BUN SERPL-MCNC: 14.7 MG/DL (ref 8–23)
CALCIUM SERPL-MCNC: 10 MG/DL (ref 8.8–10.2)
CHLORIDE SERPL-SCNC: 105 MMOL/L (ref 98–107)
CREAT SERPL-MCNC: 0.94 MG/DL (ref 0.51–0.95)
DEPRECATED HCO3 PLAS-SCNC: 27 MMOL/L (ref 22–29)
EGFRCR SERPLBLD CKD-EPI 2021: 65 ML/MIN/1.73M2
EOSINOPHIL # BLD AUTO: ABNORMAL 10*3/UL
EOSINOPHIL # BLD MANUAL: 0.2 10E3/UL (ref 0–0.7)
EOSINOPHIL NFR BLD AUTO: ABNORMAL %
EOSINOPHIL NFR BLD MANUAL: 1 %
ERYTHROCYTE [DISTWIDTH] IN BLOOD BY AUTOMATED COUNT: 12.5 % (ref 10–15)
GLUCOSE SERPL-MCNC: 88 MG/DL (ref 70–99)
HBA1C MFR BLD: 5.6 % (ref 0–5.6)
HCT VFR BLD AUTO: 41.9 % (ref 35–47)
HGB BLD-MCNC: 13.5 G/DL (ref 11.7–15.7)
IMM GRANULOCYTES # BLD: ABNORMAL 10*3/UL
IMM GRANULOCYTES NFR BLD: ABNORMAL %
LYMPHOCYTES # BLD AUTO: ABNORMAL 10*3/UL
LYMPHOCYTES # BLD MANUAL: 13.1 10E3/UL (ref 0.8–5.3)
LYMPHOCYTES NFR BLD AUTO: ABNORMAL %
LYMPHOCYTES NFR BLD MANUAL: 72 %
MCH RBC QN AUTO: 30.4 PG (ref 26.5–33)
MCHC RBC AUTO-ENTMCNC: 32.2 G/DL (ref 31.5–36.5)
MCV RBC AUTO: 94 FL (ref 78–100)
MONOCYTES # BLD AUTO: ABNORMAL 10*3/UL
MONOCYTES # BLD MANUAL: 0.9 10E3/UL (ref 0–1.3)
MONOCYTES NFR BLD AUTO: ABNORMAL %
MONOCYTES NFR BLD MANUAL: 5 %
NEUTROPHILS # BLD AUTO: ABNORMAL 10*3/UL
NEUTROPHILS # BLD MANUAL: 4 10E3/UL (ref 1.6–8.3)
NEUTROPHILS NFR BLD AUTO: ABNORMAL %
NEUTROPHILS NFR BLD MANUAL: 22 %
NRBC # BLD AUTO: 0 10E3/UL
NRBC BLD AUTO-RTO: 0 /100
PLAT MORPH BLD: ABNORMAL
PLATELET # BLD AUTO: 165 10E3/UL (ref 150–450)
POTASSIUM SERPL-SCNC: 4.5 MMOL/L (ref 3.4–5.3)
RBC # BLD AUTO: 4.44 10E6/UL (ref 3.8–5.2)
RBC MORPH BLD: ABNORMAL
SMUDGE CELLS BLD QL SMEAR: PRESENT
SODIUM SERPL-SCNC: 140 MMOL/L (ref 135–145)
VARIANT LYMPHS BLD QL SMEAR: PRESENT
WBC # BLD AUTO: 18.2 10E3/UL (ref 4–11)

## 2023-12-13 PROCEDURE — 82043 UR ALBUMIN QUANTITATIVE: CPT | Performed by: INTERNAL MEDICINE

## 2023-12-13 PROCEDURE — G0439 PPPS, SUBSEQ VISIT: HCPCS | Performed by: INTERNAL MEDICINE

## 2023-12-13 PROCEDURE — 99214 OFFICE O/P EST MOD 30 MIN: CPT | Mod: 25 | Performed by: INTERNAL MEDICINE

## 2023-12-13 PROCEDURE — 82570 ASSAY OF URINE CREATININE: CPT | Performed by: INTERNAL MEDICINE

## 2023-12-13 PROCEDURE — 85027 COMPLETE CBC AUTOMATED: CPT | Performed by: INTERNAL MEDICINE

## 2023-12-13 PROCEDURE — 83036 HEMOGLOBIN GLYCOSYLATED A1C: CPT | Mod: GZ | Performed by: INTERNAL MEDICINE

## 2023-12-13 PROCEDURE — 36415 COLL VENOUS BLD VENIPUNCTURE: CPT | Performed by: INTERNAL MEDICINE

## 2023-12-13 PROCEDURE — 85007 BL SMEAR W/DIFF WBC COUNT: CPT | Performed by: INTERNAL MEDICINE

## 2023-12-13 PROCEDURE — 80048 BASIC METABOLIC PNL TOTAL CA: CPT | Performed by: INTERNAL MEDICINE

## 2023-12-13 RX ORDER — TRIAMCINOLONE ACETONIDE 0.1 %
PASTE (GRAM) DENTAL
Qty: 5 G | Refills: 1 | Status: SHIPPED | OUTPATIENT
Start: 2023-12-13 | End: 2024-05-16

## 2023-12-13 RX ORDER — ACYCLOVIR 400 MG/1
400 TABLET ORAL 2 TIMES DAILY
Qty: 180 TABLET | Refills: 3 | Status: SHIPPED | OUTPATIENT
Start: 2023-12-13

## 2023-12-13 ASSESSMENT — ENCOUNTER SYMPTOMS
EYE PAIN: 0
ARTHRALGIAS: 0
PARESTHESIAS: 0
NERVOUS/ANXIOUS: 0
PALPITATIONS: 0
HEMATURIA: 0
FEVER: 0
JOINT SWELLING: 0
DYSURIA: 0
BREAST MASS: 0
SORE THROAT: 0
SHORTNESS OF BREATH: 0
HEMATOCHEZIA: 0
MYALGIAS: 0
FREQUENCY: 0
COUGH: 0
DIARRHEA: 0
CONSTIPATION: 0
HEADACHES: 0
NAUSEA: 0
ABDOMINAL PAIN: 0
HEARTBURN: 0
DIZZINESS: 0
WEAKNESS: 0
CHILLS: 0

## 2023-12-13 ASSESSMENT — PAIN SCALES - GENERAL: PAINLEVEL: NO PAIN (0)

## 2023-12-13 ASSESSMENT — ACTIVITIES OF DAILY LIVING (ADL): CURRENT_FUNCTION: NO ASSISTANCE NEEDED

## 2023-12-13 NOTE — PROGRESS NOTES
"SUBJECTIVE:   Esther is a 68 year old, presenting for the following:  Very pleasant patient accompanied by    Wellness Visit and Recheck Medication (PT REPORTS THAT SHE IS HERE FOR HER ANNUAL WELLNESS VISIT.)  IN JUNE HAD A VERY BAD CASE  of bronchitis was travelling in [place] ,received steroids and antibiotics complicated by an ear infection , and loss of voice   Saw ENT and diagnosed of LE reflux .  The 10-year ASCVD risk score (Maria Teresa THOMAS, et al., 2019) is: 7.1%    Values used to calculate the score:      Age: 68 years      Sex: Female      Is Non- : No      Diabetic: No      Tobacco smoker: No      Systolic Blood Pressure: 113 mmHg      Is BP treated: No      HDL Cholesterol: 77 mg/dL      Total Cholesterol: 205 mg/dL        12/13/2023    12:51 PM   Additional Questions   Roomed by SHELBIE   Accompanied by - CHIP         12/13/2023    12:51 PM   Patient Reported Additional Medications   Patient reports taking the following new medications NONE       Are you in the first 12 months of your Medicare coverage?  No    Healthy Habits:     In general, how would you rate your overall health?  Good    Duration of exercise:  45-60 minutes    Do you usually eat at least 4 servings of fruit and vegetables a day, include whole grains    & fiber and avoid regularly eating high fat or \"junk\" foods?  Yes    Taking medications regularly:  Yes    Medication side effects:  Not applicable    Ability to successfully perform activities of daily living:  No assistance needed    Home Safety:  No safety concerns identified    Hearing Impairment:  No hearing concerns    In the past 6 months, have you been bothered by leaking of urine?  No    In general, how would you rate your overall mental or emotional health?  Fair    Additional concerns today:  Yes      Today's PHQ-2 Score:       12/13/2023     7:47 AM   PHQ-2 ( 1999 Pf[employer])   Q1: Little interest or pleasure in doing things 0   Q2: Feeling down, " depressed or hopeless 0   PHQ-2 Score 0   Q1: Little interest or pleasure in doing things Not at all   Q2: Feeling down, depressed or hopeless Not at all   PHQ-2 Score 0           Have you ever done Advance Care Planning? (For example, a Health Directive, POLST, or a discussion with a medical provider or your loved ones about your wishes): Yes, advance care planning is on file.       Fall risk  Fallen 2 or more times in the past year?: No  Any fall with injury in the past year?: No    Cognitive Screening   1) Repeat 3 items (Leader, Season, Table)    2) Clock draw: NORMAL  3) 3 item recall: Recalls 3 objects  Results: 3 items recalled: COGNITIVE IMPAIRMENT LESS LIKELY    Mini-CogTM Copyright S Halle. Licensed by the author for use in [facility]; reprinted with permission (lois@[facility]). All rights reserved.      Do you have sleep apnea, excessive snoring or daytime drowsiness? : no    Reviewed and updated as needed this visit by clinical staff   Tobacco  Allergies  Meds              Reviewed and updated as needed this visit by Provider                 Social History     Tobacco Use    Smoking status: Never    Smokeless tobacco: Never   Substance Use Topics    Alcohol use: Yes     Alcohol/week: 7.0 standard drinks of alcohol             12/13/2023     7:46 AM   Alcohol Use   Prescreen: >3 drinks/day or >7 drinks/week? Not Applicable     Do you have a current opioid prescription? No  Do you use any other controlled substances or medications that are not prescribed by a provider? None              Current providers sharing in care for this patient include:   Patient Care Team:  Lashonda José MD as PCP - General  Lashonda José MD as Assigned PCP  Lashonda José as Referring Physician  Ton López, RN as Specialty Care Coordinator (Hematology & Oncology)  Puja Ravi NP as Assigned Cancer Care Provider  Thomas Soni MD as MD (Otolaryngology)    The following health maintenance items are  reviewed in Epic and correct as of today:  Health Maintenance   Topic Date Due    MEDICARE ANNUAL WELLNESS VISIT  10/27/2023    ANNUAL REVIEW OF HM ORDERS  08/29/2024    FALL RISK ASSESSMENT  12/13/2024    MAMMO SCREENING  02/15/2025    ADVANCE CARE PLANNING  10/27/2027    COLORECTAL CANCER SCREENING  01/11/2028    LIPID  08/29/2028    DTAP/TDAP/TD IMMUNIZATION (5 - Td or Tdap) 10/01/2033    DEXA  11/15/2037    HEPATITIS C SCREENING  Completed    PHQ-2 (once per calendar year)  Completed    INFLUENZA VACCINE  Completed    Pneumococcal Vaccine: 65+ Years  Completed    ZOSTER IMMUNIZATION  Completed    RSV VACCINE (Pregnancy & 60+)  Completed    COVID-19 Vaccine  Completed    IPV IMMUNIZATION  Aged Out    HPV IMMUNIZATION  Aged Out    MENINGITIS IMMUNIZATION  Aged Out    RSV MONOCLONAL ANTIBODY  Aged Out       Current Outpatient Medications   Medication    acyclovir (ZOVIRAX) 400 MG tablet    calcium carbonate-vitamin D3 600 mg calcium- 200 unit cap    diclofenac sodium (VOLTAREN) 1 % Gel    EPINEPHrine (EPIPEN 2-DAVID) 0.3 mg/0.3 mL injection    ferrous sulfate 325 (65 FE) MG tablet    fexofenadine (ALLEGRA ALLERGY) 180 MG tablet    fexofenadine-pseudoePHEDrine (ALLEGRA-D 24) 180-240 MG 24 hr tablet    fluticasone propionate (FLONASE) 50 mcg/actuation nasal spray    glucosam bernstein dip-chondroit-C-Mn 609-251-50-3 mg cap    multivitamin (ONE A DAY) per tablet    omega-3 acid ethyl esters (LOVAZA) 1 gram capsule    triamcinolone (KENALOG) 0.1 % paste     Current Facility-Administered Medications   Medication    lidocaine (PF) (XYLOCAINE) 1 % injection 0.5 mL    lidocaine (PF) (XYLOCAINE) 1 % injection 1 mL    methylPREDNISolone (DEPO-MEDROL) injection 20 mg             Review of Systems   Constitutional:  Negative for chills and fever.   HENT:  Negative for congestion, ear pain, hearing loss and sore throat.    Eyes:  Negative for pain and visual disturbance.   Respiratory:  Negative for cough and shortness of breath.   "  Cardiovascular:  Negative for chest pain, palpitations and peripheral edema.   Gastrointestinal:  Negative for abdominal pain, constipation, diarrhea, heartburn, hematochezia and nausea.   Breasts:  Negative for tenderness, breast mass and discharge.   Genitourinary:  Negative for dysuria, frequency, genital sores, hematuria, pelvic pain, urgency, vaginal bleeding and vaginal discharge.   Musculoskeletal:  Negative for arthralgias, joint swelling and myalgias.   Skin:  Negative for rash.   Neurological:  Negative for dizziness, weakness, headaches and paresthesias.   Psychiatric/Behavioral:  Negative for mood changes. The patient is not nervous/anxious.          OBJECTIVE:   /60 (BP Location: Left arm, Patient Position: Sitting, Cuff Size: Adult Regular)   Pulse 61   Temp 97.7  F (36.5  C) (Tympanic)   Resp 16   Ht 1.664 m (5' 5.5\")   Wt 61.7 kg (136 lb 1.6 oz)   LMP  (LMP Unknown)   SpO2 99%   Breastfeeding No   BMI 22.30 kg/m   Estimated body mass index is 22.3 kg/m  as calculated from the following:    Height as of this encounter: 1.664 m (5' 5.5\").    Weight as of this encounter: 61.7 kg (136 lb 1.6 oz).  Physical Exam  GENERAL: healthy, alert and no distress  NECK: no adenopathy, no asymmetry, masses, or scars and thyroid normal to palpation  RESP: lungs clear to auscultation - no rales, rhonchi or wheezes  CV: regular rate and rhythm, normal S1 S2, no S3 or S4, no murmur, click or rub, no peripheral edema and peripheral pulses strong  ABDOMEN: soft, nontender, no hepatosplenomegaly, no masses and bowel sounds normal  MS: no gross musculoskeletal defects noted, no edema        ASSESSMENT / PLAN:   (Z00.00) Health maintenance examination  (primary encounter diagnosis)  Comment: dexa 2022  Colon due 2028   Mammo annual   Immunization complete   Plan:     (C91.10) Chronic lymphocytic leukemia (H)  Comment: stable white counts. Dos see hematology   Plan: Basic metabolic panel  (Ca, Cl, CO2, Creat, "         Gluc, K, Na, BUN), CBC with platelets and         differential, Hemoglobin A1c, Albumin Random         Urine Quantitative with Creat Ratio            (Z00.00) Encounter for Medicare annual wellness exam  Comment:   Plan: The 10-year ASCVD risk score (Maria Teresa THOMAS, et al., 2019) is: 7.1%    Values used to calculate the score:      Age: 68 years      Sex: Female      Is Non- : No      Diabetic: No      Tobacco smoker: No      Systolic Blood Pressure: 113 mmHg      Is BP treated: No      HDL Cholesterol: 77 mg/dL      Total Cholesterol: 205 mg/dL  Discussed currnetly ct chest showed no coronary calcification   Risk less than 7.5 %   No statin indication as of now   (K21.9) Gastroesophageal reflux disease without esophagitis  Comment:   Plan: can use famotidine as needed . Hoarseness has resolved     (M79.662) Pain of left lower leg  Comment:   Plan: triamcinolone (KENALOG) 0.1 % paste            (B00.1) Herpes labialis  Comment:   Plan: acyclovir (ZOVIRAX) 400 MG tablet                    COUNSELING:  Reviewed preventive health counseling, as reflected in patient instructions        She reports that she has never smoked. She has never used smokeless tobacco.      Appropriate preventive services were discussed with this patient, including applicable screening as appropriate for fall prevention, nutrition, physical activity, Tobacco-use cessation, weight loss and cognition.  Checklist reviewing preventive services available has been given to the patient.    Reviewed patients plan of care and provided an AVS. The Basic Care Plan (routine screening as documented in Health Maintenance) for Esther meets the Care Plan requirement. This Care Plan has been established and reviewed with the Patient.        Lashonda José MD  [facility]    Identified Health Risks:

## 2023-12-13 NOTE — PROGRESS NOTES
"The patient was provided with suggestions to help her develop a healthy emotional lifestyle.  Answers submitted by the patient for this visit:  Annual Preventive Visit (Submitted on 12/13/2023)  Chief Complaint: Annual Exam:  In general, how would you rate your overall physical health?: good  Do you usually eat at least 4 servings of fruit and vegetables a day, include whole grains & fiber, and avoid regularly eating high fat or \"junk\" foods? : Yes  Taking medications regularly:: Yes  Medication side effects:: Not applicable  Activities of Daily Living: no assistance needed  Home safety: no safety concerns identified  Hearing Impairment:: no hearing concerns  In the past 6 months, have you been bothered by leaking of urine?: No  abdominal pain: No  Blood in stool: No  Blood in urine: No  chest pain: No  chills: No  congestion: No  constipation: No  cough: No  diarrhea: No  dizziness: No  ear pain: No  eye pain: No  nervous/anxious: No  fever: No  frequency: No  genital sores: No  headaches: No  hearing loss: No  heartburn: No  arthralgias: No  joint swelling: No  peripheral edema: No  mood changes: No  myalgias: No  nausea: No  dysuria: No  palpitations: No  Skin sensation changes: No  sore throat: No  urgency: No  rash: No  shortness of breath: No  visual disturbance: No  weakness: No  pelvic pain: No  vaginal bleeding: No  vaginal discharge: No  tenderness: No  breast mass: No  breast discharge: No  In general, how would you rate your overall mental or emotional health?: fair  Additional concerns today:: Yes  Exercise outside of work (Submitted on 12/13/2023)  Chief Complaint: Annual Exam:  Duration of exercise:: 45-60 minutes    "

## 2023-12-13 NOTE — PATIENT INSTRUCTIONS
2025 bone density due   Colonoscopy due 2028        Patient Education   Personalized Prevention Plan  You are due for the preventive services outlined below.  Your care team is available to assist you in scheduling these services.  If you have already completed any of these items, please share that information with your care team to update in your medical record.  Health Maintenance Due   Topic Date Due    Annual Wellness Visit  10/27/2023     Your Health Risk Assessment indicates you feel you are not in good emotional health.    Recreation   Recreation is not limited to sports and team events. It includes any activity that provides relaxation, interest, enjoyment, and exercise. Recreation provides an outlet for physical, mental, and social energy. It can give a sense of worth and achievement. It can help you stay healthy.    Mental Exercise and Social Involvement  Mental and emotional health is as important as physical health. Keep in touch with friends and family. Stay as active as possible. Continue to learn and challenge yourself.   Things you can do to stay mentally active are:  Learn something new, like a foreign language or musical instrument.   Play SCRABBLE or do crossword puzzles. If you cannot find people to play these games with you at home, you can play them with others on your computer through the Internet.   Join a games club--anything from card games to chess or checkers or lawn bowling.   Start a new hobby.   Go back to school.   Volunteer.   Read.   Keep up with world events.

## 2023-12-14 LAB
CREAT UR-MCNC: 93.7 MG/DL
MICROALBUMIN UR-MCNC: <12 MG/L
MICROALBUMIN/CREAT UR: NORMAL MG/G{CREAT}

## 2024-01-22 ENCOUNTER — TELEPHONE (OUTPATIENT)
Dept: ONCOLOGY | Facility: HOSPITAL | Age: 69
End: 2024-01-22
Payer: COMMERCIAL

## 2024-02-28 ENCOUNTER — ONCOLOGY VISIT (OUTPATIENT)
Dept: ONCOLOGY | Facility: HOSPITAL | Age: 69
End: 2024-02-28
Attending: INTERNAL MEDICINE
Payer: COMMERCIAL

## 2024-02-28 ENCOUNTER — LAB (OUTPATIENT)
Dept: INFUSION THERAPY | Facility: HOSPITAL | Age: 69
End: 2024-02-28
Attending: INTERNAL MEDICINE
Payer: COMMERCIAL

## 2024-02-28 VITALS
TEMPERATURE: 98.9 F | HEIGHT: 66 IN | RESPIRATION RATE: 18 BRPM | BODY MASS INDEX: 21.73 KG/M2 | OXYGEN SATURATION: 98 % | SYSTOLIC BLOOD PRESSURE: 110 MMHG | HEART RATE: 55 BPM | WEIGHT: 135.2 LBS | DIASTOLIC BLOOD PRESSURE: 61 MMHG

## 2024-02-28 DIAGNOSIS — C91.10 CHRONIC LYMPHOCYTIC LEUKEMIA (H): ICD-10-CM

## 2024-02-28 DIAGNOSIS — C91.10 CHRONIC LYMPHOCYTIC LEUKEMIA (H): Primary | ICD-10-CM

## 2024-02-28 LAB
BASOPHILS # BLD AUTO: ABNORMAL 10*3/UL
BASOPHILS # BLD MANUAL: 0 10E3/UL (ref 0–0.2)
BASOPHILS NFR BLD AUTO: ABNORMAL %
BASOPHILS NFR BLD MANUAL: 0 %
EOSINOPHIL # BLD AUTO: ABNORMAL 10*3/UL
EOSINOPHIL # BLD MANUAL: 0 10E3/UL (ref 0–0.7)
EOSINOPHIL NFR BLD AUTO: ABNORMAL %
EOSINOPHIL NFR BLD MANUAL: 0 %
ERYTHROCYTE [DISTWIDTH] IN BLOOD BY AUTOMATED COUNT: 13.4 % (ref 10–15)
HCT VFR BLD AUTO: 39.7 % (ref 35–47)
HGB BLD-MCNC: 12.5 G/DL (ref 11.7–15.7)
IMM GRANULOCYTES # BLD: ABNORMAL 10*3/UL
IMM GRANULOCYTES NFR BLD: ABNORMAL %
LYMPHOCYTES # BLD AUTO: ABNORMAL 10*3/UL
LYMPHOCYTES # BLD MANUAL: 10.4 10E3/UL (ref 0.8–5.3)
LYMPHOCYTES NFR BLD AUTO: ABNORMAL %
LYMPHOCYTES NFR BLD MANUAL: 67 %
MCH RBC QN AUTO: 29.6 PG (ref 26.5–33)
MCHC RBC AUTO-ENTMCNC: 31.5 G/DL (ref 31.5–36.5)
MCV RBC AUTO: 94 FL (ref 78–100)
MONOCYTES # BLD AUTO: ABNORMAL 10*3/UL
MONOCYTES # BLD MANUAL: 0.3 10E3/UL (ref 0–1.3)
MONOCYTES NFR BLD AUTO: ABNORMAL %
MONOCYTES NFR BLD MANUAL: 2 %
NEUTROPHILS # BLD AUTO: ABNORMAL 10*3/UL
NEUTROPHILS # BLD MANUAL: 4.8 10E3/UL (ref 1.6–8.3)
NEUTROPHILS NFR BLD AUTO: ABNORMAL %
NEUTROPHILS NFR BLD MANUAL: 31 %
NRBC # BLD AUTO: 0 10E3/UL
NRBC BLD AUTO-RTO: 0 /100
PLAT MORPH BLD: ABNORMAL
PLATELET # BLD AUTO: 231 10E3/UL (ref 150–450)
RBC # BLD AUTO: 4.22 10E6/UL (ref 3.8–5.2)
RBC MORPH BLD: ABNORMAL
SMUDGE CELLS BLD QL SMEAR: PRESENT
WBC # BLD AUTO: 15.5 10E3/UL (ref 4–11)

## 2024-02-28 PROCEDURE — 99213 OFFICE O/P EST LOW 20 MIN: CPT | Performed by: INTERNAL MEDICINE

## 2024-02-28 PROCEDURE — 85027 COMPLETE CBC AUTOMATED: CPT

## 2024-02-28 PROCEDURE — 85007 BL SMEAR W/DIFF WBC COUNT: CPT

## 2024-02-28 PROCEDURE — G2211 COMPLEX E/M VISIT ADD ON: HCPCS | Performed by: INTERNAL MEDICINE

## 2024-02-28 PROCEDURE — G0463 HOSPITAL OUTPT CLINIC VISIT: HCPCS | Performed by: INTERNAL MEDICINE

## 2024-02-28 PROCEDURE — 36415 COLL VENOUS BLD VENIPUNCTURE: CPT

## 2024-02-28 ASSESSMENT — PAIN SCALES - GENERAL: PAINLEVEL: NO PAIN (0)

## 2024-02-28 NOTE — PROGRESS NOTES
"Oncology Rooming Note    February 28, 2024 3:06 PM   Esther Prince is a 69 year old female who presents for:    Chief Complaint   Patient presents with    Oncology Clinic Visit     Chronic lymphocytic leukemia (H)       Initial Vitals: /61   Pulse 55   Temp 98.9  F (37.2  C)   Resp 18   Ht 1.664 m (5' 5.5\")   Wt 61.3 kg (135 lb 3.2 oz)   LMP  (LMP Unknown)   SpO2 98%   BMI 22.16 kg/m   Estimated body mass index is 22.16 kg/m  as calculated from the following:    Height as of this encounter: 1.664 m (5' 5.5\").    Weight as of this encounter: 61.3 kg (135 lb 3.2 oz). Body surface area is 1.68 meters squared.  No Pain (0) Comment: Data Unavailable   No LMP recorded (lmp unknown). Patient is postmenopausal.  Allergies reviewed: Yes  Medications reviewed: Yes    Medications: Medication refills not needed today.  Pharmacy name entered into [employer]:    [id number] - [place], [place] - [address] [facility] - [place], [place] - [address]  [id number] - [place], [place] - [address]    Frailty Screening:   Is the patient here for a new oncology consult visit in cancer care? 2. No      Clinical concerns: L underarm smells different she states.       Karolina Rivera LPN             "

## 2024-02-28 NOTE — LETTER
"    2/28/2024         RE: Esther Prince  [address]  [address]  [place] [address]        Dear Colleague,    Thank you for referring your patient, Esther Prince, to the [facility]. Please see a copy of my visit note below.    Oncology Rooming Note    February 28, 2024 3:06 PM   Esther Prince is a 69 year old female who presents for:    Chief Complaint   Patient presents with     Oncology Clinic Visit     Chronic lymphocytic leukemia (H)       Initial Vitals: /61   Pulse 55   Temp 98.9  F (37.2  C)   Resp 18   Ht 1.664 m (5' 5.5\")   Wt 61.3 kg (135 lb 3.2 oz)   LMP  (LMP Unknown)   SpO2 98%   BMI 22.16 kg/m   Estimated body mass index is 22.16 kg/m  as calculated from the following:    Height as of this encounter: 1.664 m (5' 5.5\").    Weight as of this encounter: 61.3 kg (135 lb 3.2 oz). Body surface area is 1.68 meters squared.  No Pain (0) Comment: Data Unavailable   No LMP recorded (lmp unknown). Patient is postmenopausal.  Allergies reviewed: Yes  Medications reviewed: Yes    Medications: Medication refills not needed today.  Pharmacy name entered into [employer]:    [id number] - [place], [place] - [address] [facility] - [place], [place] - [address]  [id number] [place] [place] [address]    Frailty Screening:   Is the patient here for a new oncology consult visit in cancer care? 2. No      Clinical concerns: L underarm smells different she states.       Karolina Rivera LPN               [facility] Hematology and Oncology Progress Note    Patient: Esther Prince  MRN: [id number]  Date of Service: Feb 28, 2024        Assessment and Plan:    1.  Chronic lymphocytic leukemia: CBC from today is reviewed and shows a white count of 5.5, hemoglobin 12.5 and platelets 231,000.  Total white blood cell count has been stable is actually slightly less than her most recent clinic visit.  We will " "continue to see her yearly.  She will let us know in the interim if she develops any new symptoms.    The longitudinal plan of care for the diagnosis(es)/condition(s) as documented were addressed during this visit. Due to the added complexity in care, I will continue to support Esther in the subsequent management and with ongoing continuity of care.      ECOG Performance  0    Diagnosis:    1.  Chronic lymphocytic leukemia: Diagnosed 2011.    Treatment:    Observation.    Interim History:    Esther returns today for a follow-up visit.  She was last in clinic a little over a year ago.  In the interim she has been doing okay.  No acute complaints today.  No fevers, chills, night sweats.    Review of Systems:    As above in the history.     Review of Systems otherwise Negative for:  General: chills, fever or night sweats  Psychological: anxiety or depression  Ophthalmic: blurry vision, double vision or loss of vision, vision change  ENT: epistaxis, oral lesions, hearing changes  Hematological and Lymphatic: bleeding, bruising, jaundice, swollen lymph nodes  Endocrine: hot flashes, unexpected weight changes  Respiratory: cough, hemoptysis, orthopnea or shortness of breath/MITCHELL  Cardiovascular: chest pain, edema, palpitations or PND  Gastrointestinal: abdominal pain, blood in stools, change in bowel habits, constipation, diarrhea or nausea/vomiting  Genito-Urinary: change in urinary stream, incontinence, frequency/urgency  Musculoskeletal: joint pain, stiffness, swelling, muscle pain  Neurological: dizziness, headaches, numbness/tingling  Dermatological: lumps and rash    Past History:    Past Medical History:   Diagnosis Date     Tick borne fever 2018     Physical Exam:    /61   Pulse 55   Temp 98.9  F (37.2  C)   Resp 18   Ht 1.664 m (5' 5.5\")   Wt 61.3 kg (135 lb 3.2 oz)   LMP  (LMP Unknown)   SpO2 98%   BMI 22.16 kg/m      General: patient appears stated age of 66 year old. Nontoxic and in no distress. "   HEENT: Head: atraumatic, normocephalic. Sclerae anicteric.  Chest:  Normal respiratory effort  Cardiac:  No edema.   Abdomen: abdomen is non-distended  Extremities: normal tone and muscle bulk.  Skin: no lesions or rash on visible skin. Warm and dry.   CNS: alert and oriented. Grossly non-focal.   Psychiatric: normal mood and affect.     Lab Results:    Recent Results (from the past 168 hour(s))   CBC with platelets and differential   Result Value Ref Range    WBC Count 15.5 (H) 4.0 - 11.0 10e3/uL    RBC Count 4.22 3.80 - 5.20 10e6/uL    Hemoglobin 12.5 11.7 - 15.7 g/dL    Hematocrit 39.7 35.0 - 47.0 %    MCV 94 78 - 100 fL    MCH 29.6 26.5 - 33.0 pg    MCHC 31.5 31.5 - 36.5 g/dL    RDW 13.4 10.0 - 15.0 %    Platelet Count 231 150 - 450 10e3/uL    % Neutrophils      % Lymphocytes      % Monocytes      % Eosinophils      % Basophils      % Immature Granulocytes      Absolute Neutrophils      Absolute Lymphocytes      Absolute Monocytes      Absolute Eosinophils      Absolute Basophils      Absolute Immature Granulocytes       Imaging:    No results found.      Signed by: Cassius Garsia MD      Again, thank you for allowing me to participate in the care of your patient.        Sincerely,        Cassius Garsia MD

## 2024-02-28 NOTE — PROGRESS NOTES
[facility]    Patient: Esther Prince  MRN: [id number]  Date of Service: Feb 28, 2024        Assessment and Plan:    1.  Chronic lymphocytic leukemia: CBC from today is reviewed and shows a white count of 5.5, hemoglobin 12.5 and platelets 231,000.  Total white blood cell count has been stable is actually slightly less than her most recent clinic visit.  We will continue to see her yearly.  She will let us know in the interim if she develops any new symptoms.    The longitudinal plan of care for the diagnosis(es)/condition(s) as documented were addressed during this visit. Due to the added complexity in care, I will continue to support Esther in the subsequent management and with ongoing continuity of care.      ECOG Performance  0    Diagnosis:    1.  Chronic lymphocytic leukemia: Diagnosed 2011.    Treatment:    Observation.    Interim History:    Esther returns today for a follow-up visit.  She was last in clinic a little over a year ago.  In the interim she has been doing okay.  No acute complaints today.  No fevers, chills, night sweats.    Review of Systems:    As above in the history.     Review of Systems otherwise Negative for:  General: chills, fever or night sweats  Psychological: anxiety or depression  Ophthalmic: blurry vision, double vision or loss of vision, vision change  ENT: epistaxis, oral lesions, hearing changes  Hematological and Lymphatic: bleeding, bruising, jaundice, swollen lymph nodes  Endocrine: hot flashes, unexpected weight changes  Respiratory: cough, hemoptysis, orthopnea or shortness of breath/MITCHELL  Cardiovascular: chest pain, edema, palpitations or PND  Gastrointestinal: abdominal pain, blood in stools, change in bowel habits, constipation, diarrhea or nausea/vomiting  Genito-Urinary: change in urinary stream, incontinence, frequency/urgency  Musculoskeletal: joint pain, stiffness, swelling, muscle pain  Neurological: dizziness, headaches,  "numbness/tingling  Dermatological: lumps and rash    Past History:    Past Medical History:   Diagnosis Date    Tick borne fever 2018     Physical Exam:    /61   Pulse 55   Temp 98.9  F (37.2  C)   Resp 18   Ht 1.664 m (5' 5.5\")   Wt 61.3 kg (135 lb 3.2 oz)   LMP  (LMP Unknown)   SpO2 98%   BMI 22.16 kg/m      General: patient appears stated age of 66 year old. Nontoxic and in no distress.   HEENT: Head: atraumatic, normocephalic. Sclerae anicteric.  Chest:  Normal respiratory effort  Cardiac:  No edema.   Abdomen: abdomen is non-distended  Extremities: normal tone and muscle bulk.  Skin: no lesions or rash on visible skin. Warm and dry.   CNS: alert and oriented. Grossly non-focal.   Psychiatric: normal mood and affect.     Lab Results:    Recent Results (from the past 168 hour(s))   CBC with platelets and differential   Result Value Ref Range    WBC Count 15.5 (H) 4.0 - 11.0 10e3/uL    RBC Count 4.22 3.80 - 5.20 10e6/uL    Hemoglobin 12.5 11.7 - 15.7 g/dL    Hematocrit 39.7 35.0 - 47.0 %    MCV 94 78 - 100 fL    MCH 29.6 26.5 - 33.0 pg    MCHC 31.5 31.5 - 36.5 g/dL    RDW 13.4 10.0 - 15.0 %    Platelet Count 231 150 - 450 10e3/uL    % Neutrophils      % Lymphocytes      % Monocytes      % Eosinophils      % Basophils      % Immature Granulocytes      Absolute Neutrophils      Absolute Lymphocytes      Absolute Monocytes      Absolute Eosinophils      Absolute Basophils      Absolute Immature Granulocytes       Imaging:    No results found.      Signed by: Cassius Garsia MD    "

## 2024-03-07 ENCOUNTER — ANCILLARY PROCEDURE (OUTPATIENT)
Dept: MAMMOGRAPHY | Facility: CLINIC | Age: 69
End: 2024-03-07
Payer: COMMERCIAL

## 2024-03-07 DIAGNOSIS — Z12.31 VISIT FOR SCREENING MAMMOGRAM: ICD-10-CM

## 2024-03-07 PROCEDURE — 77067 SCR MAMMO BI INCL CAD: CPT | Mod: TC | Performed by: RADIOLOGY

## 2024-03-07 PROCEDURE — 77063 BREAST TOMOSYNTHESIS BI: CPT | Mod: TC | Performed by: RADIOLOGY

## 2024-03-28 ENCOUNTER — MYC MEDICAL ADVICE (OUTPATIENT)
Dept: INTERNAL MEDICINE | Facility: CLINIC | Age: 69
End: 2024-03-28
Payer: COMMERCIAL

## 2024-03-28 DIAGNOSIS — T75.3XXA MOTION SICKNESS, INITIAL ENCOUNTER: Primary | ICD-10-CM

## 2024-03-29 ENCOUNTER — TELEPHONE (OUTPATIENT)
Dept: INTERNAL MEDICINE | Facility: CLINIC | Age: 69
End: 2024-03-29
Payer: COMMERCIAL

## 2024-03-29 RX ORDER — SCOLOPAMINE TRANSDERMAL SYSTEM 1 MG/1
1 PATCH, EXTENDED RELEASE TRANSDERMAL
Qty: 5 PATCH | Refills: 2 | Status: SHIPPED | OUTPATIENT
Start: 2024-03-29 | End: 2024-05-16

## 2024-03-29 NOTE — TELEPHONE ENCOUNTER
Would you like to start a prior auth or call in alternative.    Please advise.     Disp Refills Start End KERRY   scopolamine (TRANSDERM) 1 MG/3DAYS 72 hr patch 5 patch 2 3/29/2024 -- No   Sig - Route: Place 1 patch onto the skin every 72 hours - Transdermal   Sent to pharmacy as: Scopolamine 1 MG/3DAYS Transdermal Patch 72 Hour (TRANSDERM)   Class: E-Prescribe   Order: [id number]   E-Prescribing Status: Receipt confirmed by pharmacy (3/29/2024  1:27 PM CDT)     Printout Tracking    External Result Report     Pharmacy    [id number] - [place], [place] - [address]     Associated Diagnoses    Motion sickness, initial encounter [T75.3XXA]  - Primary

## 2024-05-16 ENCOUNTER — ANCILLARY PROCEDURE (OUTPATIENT)
Dept: GENERAL RADIOLOGY | Facility: CLINIC | Age: 69
End: 2024-05-16
Attending: INTERNAL MEDICINE
Payer: COMMERCIAL

## 2024-05-16 ENCOUNTER — OFFICE VISIT (OUTPATIENT)
Dept: INTERNAL MEDICINE | Facility: CLINIC | Age: 69
End: 2024-05-16
Payer: COMMERCIAL

## 2024-05-16 VITALS
SYSTOLIC BLOOD PRESSURE: 106 MMHG | WEIGHT: 133 LBS | TEMPERATURE: 97.9 F | OXYGEN SATURATION: 100 % | HEIGHT: 66 IN | RESPIRATION RATE: 16 BRPM | BODY MASS INDEX: 21.38 KG/M2 | HEART RATE: 71 BPM | DIASTOLIC BLOOD PRESSURE: 60 MMHG

## 2024-05-16 DIAGNOSIS — M25.552 HIP PAIN, LEFT: Primary | ICD-10-CM

## 2024-05-16 DIAGNOSIS — M79.662 PAIN OF LEFT LOWER LEG: ICD-10-CM

## 2024-05-16 DIAGNOSIS — M25.552 HIP PAIN, LEFT: ICD-10-CM

## 2024-05-16 PROCEDURE — 73502 X-RAY EXAM HIP UNI 2-3 VIEWS: CPT | Mod: TC | Performed by: RADIOLOGY

## 2024-05-16 PROCEDURE — 99213 OFFICE O/P EST LOW 20 MIN: CPT | Performed by: INTERNAL MEDICINE

## 2024-05-16 RX ORDER — TRIAMCINOLONE ACETONIDE 0.1 %
PASTE (GRAM) DENTAL
Qty: 5 G | Refills: 1 | Status: SHIPPED | OUTPATIENT
Start: 2024-05-16

## 2024-05-16 ASSESSMENT — ENCOUNTER SYMPTOMS: HIP PAIN: 1

## 2024-05-16 NOTE — PROGRESS NOTES
Assessment & Plan     Pain of left lower leg    - triamcinolone (KENALOG) 0.1 % paste; APPLY TO TEETH 3 (THREE) TIMES A DAY FOR 5 DAYS.    Hip pain, left  Symptoms/ exam suggestive of trochanteric bursitis , gluteal strain. Will get baseline xray ( did tell her xray doesn't check for bursitis )  no indication for MRI/US at this point   Conservative care with NSAID , heat ,ice and PT recommended . Because this has been happening for a long time earlier appointment with ortho for possible steroid shot /US joint   - XR Hip Left 2-3 Views; Future  - Orthopedic  Referral; Future  - Physical Therapy  Referral; Future                Subjective   Esther is a 69 year old, presenting for the following health issues:  Had a  holiday and did a lot of biking and walking , sailing .   Mostly at night when she is sleeping or if she sits too long , burning pain ,  No falls , is not limping but pain is making it less comfortable to walk long distances   Hip Pain (Left hip pains that radiates down to knees x 1 month (no recent falls or injuries) )      5/16/2024     7:56 AM   Additional Questions   Roomed by VARSHA Kruse   Accompanied by alone         5/16/2024     7:56 AM   Patient Reported Additional Medications   Patient reports taking the following new medications none     Hip Pain    History of Present Illness       Reason for visit:  Pain in left hip that travels down to knee  Symptom onset:  More than a month  Symptoms include:  Pain in left hip and along the outside of leg. Goes down to knee.  Symptom progression:  Staying the same  Had these symptoms before:  No  What makes it worse:  Sitting too long.  What makes it better:  Mild stretching    She eats 4 or more servings of fruits and vegetables daily.She consumes 0 sweetened beverage(s) daily.She exercises with enough effort to increase her heart rate 30 to 60 minutes per day.  She exercises with enough effort to increase her heart rate 6 days per  "week.   She is taking medications regularly.               Current Outpatient Medications   Medication Sig Dispense Refill    acyclovir (ZOVIRAX) 400 MG tablet Take 1 tablet (400 mg) by mouth 2 times daily 180 tablet 3    calcium carbonate-vitamin D3 600 mg calcium- 200 unit cap [CALCIUM CARBONATE-VITAMIN D3 600 MG CALCIUM- 200 UNIT CAP] Take by mouth.      diclofenac sodium (VOLTAREN) 1 % Gel [DICLOFENAC SODIUM (VOLTAREN) 1 % GEL] APPLY 2 G TOPICALLY EVERY 6 (SIX) HOURS AS NEEDED. APPLY 2 GRAMS PER APPLICATION.  1    ferrous sulfate 325 (65 FE) MG tablet [FERROUS SULFATE 325 (65 FE) MG TABLET] Take 1 tablet by mouth daily with breakfast.      fexofenadine (ALLEGRA ALLERGY) 180 MG tablet [FEXOFENADINE (ALLEGRA ALLERGY) 180 MG TABLET] Take 180 mg by mouth.      fexofenadine-pseudoePHEDrine (ALLEGRA-D 24) 180-240 MG 24 hr tablet Take 1 tablet by mouth daily 314 tablet 1    fluticasone propionate (FLONASE) 50 mcg/actuation nasal spray [FLUTICASONE PROPIONATE (FLONASE) 50 MCG/ACTUATION NASAL SPRAY] 1 spray.      glucosam bernstein dip-chondroit-C-Mn 086-051-91-3 mg cap [GLUCOSAM BERNSTEIN DIP-CHONDROIT-C--678-36-3 MG CAP] Take 1 capsule by mouth.      multivitamin (ONE A DAY) per tablet [MULTIVITAMIN (ONE A DAY) PER TABLET] Take 1 tablet by mouth.      omega-3 acid ethyl esters (LOVAZA) 1 gram capsule [OMEGA-3 ACID ETHYL ESTERS (LOVAZA) 1 GRAM CAPSULE] Take 1,000 mg by mouth.      triamcinolone (KENALOG) 0.1 % paste APPLY TO TEETH 3 (THREE) TIMES A DAY FOR 5 DAYS. 5 g 1     No current facility-administered medications for this visit.           Objective    /60 (BP Location: Left arm, Patient Position: Sitting, Cuff Size: Adult Regular)   Pulse 71   Temp 97.9  F (36.6  C) (Tympanic)   Resp 16   Ht 1.664 m (5' 5.5\")   Wt 60.3 kg (133 lb)   LMP  (LMP Unknown)   SpO2 100%   BMI 21.80 kg/m    Body mass index is 21.8 kg/m .  Physical Exam   GENERAL: alert and no distress  NECK: no adenopathy, no asymmetry, masses, or " scars  RESP: lungs clear to auscultation - no rales, rhonchi or wheezes  CV: regular rate and rhythm, normal S1 S2, no S3 or S4, no murmur, click or rub, no peripheral edema  ABDOMEN: soft, nontender, no hepatosplenomegaly, no masses and bowel sounds normal  MS: no gross musculoskeletal defects noted, no edema  Gait , slight limp   Hip rotation normal both sides    Focal point tenderness lower left buttock posteriorly   Straight leg negative         Signed Electronically by: Lashonda José MD

## 2024-05-22 ENCOUNTER — THERAPY VISIT (OUTPATIENT)
Dept: PHYSICAL THERAPY | Facility: CLINIC | Age: 69
End: 2024-05-22
Payer: COMMERCIAL

## 2024-05-22 DIAGNOSIS — M25.552 HIP PAIN, LEFT: Primary | ICD-10-CM

## 2024-05-22 PROCEDURE — 97161 PT EVAL LOW COMPLEX 20 MIN: CPT | Mod: GP

## 2024-05-22 PROCEDURE — 97110 THERAPEUTIC EXERCISES: CPT | Mod: GP

## 2024-05-22 ASSESSMENT — ACTIVITIES OF DAILY LIVING (ADL)
ABILITY_TO_PARTICIPATE_IN_YOUR_DESIRED_SPORT_AS_LONG_AS_YOU_WOULD_LIKE: SLIGHT DIFFICULTY
HOW_WOULD_YOU_RATE_YOUR_CURRENT_LEVEL_OF_FUNCTION_DURING_YOUR_USUAL_ACTIVITIES_OF_DAILY_LIVING_FROM_0_TO_100_WITH_100_BEING_YOUR_LEVEL_OF_FUNCTION_PRIOR_TO_YOUR_HIP_PROBLEM_AND_0_BEING_THE_INABILITY_TO_PERFORM_ANY_OF_YOUR_USUAL_DAILY_ACTIVITIES?: 80
ADL_SCORE(%): INCOMPLETE
SPORTS_TOTAL_ITEM_SCORE: 0
LANDING: SLIGHT DIFFICULTY
STANDING_FOR_15_MINUTES: SLIGHT DIFFICULTY
HOS_ADL_HIGHEST_POTENTIAL_SCORE: 4
PLEASE_INDICATE_YOR_PRIMARY_REASON_FOR_REFERRAL_TO_THERAPY:: HIP
LOW_IMPACT_ACTIVITIES_LIKE_FAST_WALKING: MODERATE DIFFICULTY
SPORTS_SCORE(%): 0
JUMPING: SLIGHT DIFFICULTY
STARTING_AND_STOPPING_QUICKLY: MODERATE DIFFICULTY
HOW_WOULD_YOU_RATE_YOUR_CURRENT_LEVEL_OF_FUNCTION_DURING_YOUR_SPORTS_RELATED_ACTIVITIES_FROM_0_TO_100_WITH_100_BEING_YOUR_LEVEL_OF_FUNCTION_PRIOR_TO_YOUR_HIP_PROBLEM_AND_0_BEING_THE_INABILITY_TO_PERFORM_ANY_OF_YOUR_USUAL_DAILY_ACTIVITIES?: 70
SPORTS_HIGHEST_POTENTIAL_SCORE: 36
ADL_HIGHEST_POTENTIAL_SCORE: 4
STANDING FOR 15 MINUTES: SLIGHT DIFFICULTY
HOW_WOULD_YOU_RATE_YOUR_CURRENT_LEVEL_OF_FUNCTION_DURING_YOUR_USUAL_ACTIVITIES_OF_DAILY_LIVING_FROM_0_TO_100_WITH_100_BEING_YOUR_LEVEL_OF_FUNCTION_PRIOR_TO_YOUR_HIP_PROBLEM_AND_0_BEING_THE_INABILITY_TO_PERFORM_ANY_OF_YOUR_USUAL_DAILY_ACTIVITIES?: 80
SWINGING_OBJECTS_LIKE_A_GOLF_CLUB: NO DIFFICULTY AT ALL
CUTTING/LATERAL_MOVEMENTS: EXTREME DIFFICULTY
SPORTS_COUNT: 9
HOW_WOULD_YOU_RATE_YOUR_CURRENT_LEVEL_OF_FUNCTION?: NEARLY NORMAL
ADL_TOTAL_ITEM_SCORE: INCOMPLETE
ABILITY_TO_PERFORM_ACTIVITY_WITH_YOUR_NORMAL_TECHNIQUE: SLIGHT DIFFICULTY
RUNNING_ONE_MILE: EXTREME DIFFICULTY
HOS_ADL_ITEM_SCORE_TOTAL: 3
ADL_COUNT: 1

## 2024-05-22 NOTE — PROGRESS NOTES
PHYSICAL THERAPY EVALUATION  Type of Visit: Evaluation    See electronic medical record for Abuse and Falls Screening details.    Subjective       Presenting condition or subjective complaint: left hip burning pain Pt reports L hip pain started 3 months ago and was worsened one month ago. She describes the pain as burning in the buttock and pain can radiate down the thigh usually not past the knee. Walking on flat surfaces seems to be worse than walking on trails that more elevation change.   Date of onset: 02/22/24    Relevant medical history:   Past Medical History:   Diagnosis Date    Tick borne fever 2018          Dates & types of surgery: hip replacement right side  Past Surgical History:   Procedure Laterality Date    TOTAL HIP ARTHROPLASTY Right 2015    TUBAL LIGATION           Prior diagnostic imaging/testing results: X-ray     Prior therapy history for the same diagnosis, illness or injury: Yes 2010        Living Environment  Social support: With a significant other or spouse   Type of home: Apartment/condo; 1 level   Stairs to enter the home: No       Ramp: Yes   Stairs inside the home: No       Help at home: None  Equipment owned:       Employment: No    Hobbies/Interests: [employer] bicycling    Patient goals for therapy: sleep and walk more than 1mile    Pain assessment: See objective evaluation for additional pain details     Objective   HIP EVALUATION  PAIN: Pain is Exacerbated By: sitting for long durations, sleeping, walking on flat surfaces for long duration  INTEGUMENTARY (edema, incisions): WNL  POSTURE: WNL  GAIT: B Trendelenburg   BALANCE/PROPRIOCEPTION:  SL stance WNL and pain free B  ROM:  L hip ER limited to 45 deg passively, all other ROM WNL, R hip mobility WNL  PELVIC/SI SCREEN: WNL  STRENGTH:  B hip strength 5/5 throughout  LE FLEXIBILITY:  PSLR to 85 deg B  SPECIAL TESTS:  lumbar compression and quadrant negative, L slump test mildly creates buttock pain, PSLR negative  FUNCTIONAL  TESTS:  squat- quad dominant, no valgus present  PALPATION:  TTP L piriformis near sacrum  JOINT MOBILITY: decreased L hip ER at 90 deg flex    Assessment & Plan   CLINICAL IMPRESSIONS  Medical Diagnosis: L hip pain    Treatment Diagnosis: L piriformis syndrome   Impression/Assessment: Patient is a 69 year old female with L hip pain complaints.  The following significant findings have been identified: Pain, Decreased ROM/flexibility, Decreased joint mobility, Impaired gait, Impaired muscle performance, and Decreased activity tolerance. These impairments interfere with their ability to perform recreational activities, driving , and community mobility as compared to previous level of function.     Clinical Decision Making (Complexity):  Clinical Presentation: Stable/Uncomplicated  Clinical Presentation Rationale: based on medical and personal factors listed in PT evaluation  Clinical Decision Making (Complexity): Low complexity    PLAN OF CARE  Treatment Interventions:  Interventions: Manual Therapy, Neuromuscular Re-education, Therapeutic Activity, Therapeutic Exercise    Long Term Goals     PT Goal 1  Goal Identifier: LTG  Goal Description: Pt will have decreased L hip pain with to 0/10 with sitting for 20 minutes.  Target Date: 07/17/24      Frequency of Treatment: 1x/week  Duration of Treatment: 12 weeks    Recommended Referrals to Other Professionals: none  Education Assessment:   Learner/Method: Patient  Education Comments: Shows great knowledge of exercise and mechanics    Risks and benefits of evaluation/treatment have been explained.   Patient/Family/caregiver agrees with Plan of Care.     Evaluation Time:     PT Eval, Low Complexity Minutes (67291): 25     Signing Clinician: Ronnell Duff PT      [facility]                                                                                   OUTPATIENT PHYSICAL THERAPY      PLAN OF TREATMENT FOR OUTPATIENT REHABILITATION   Patient's  Last Name, First Name, PAULINA PrinceEsther MORAES YOB: 1955   Provider's Name   [facility]   Medical Record No.  [id number]     Onset Date: 02/22/24  Start of Care Date: 05/22/24     Medical Diagnosis:  L hip pain      PT Treatment Diagnosis:  L piriformis syndrome Plan of Treatment  Frequency/Duration: 1x/week/ 12 weeks    Certification date from 05/22/24 to 08/14/24         See note for plan of treatment details and functional goals     Ronnell Duff, PT                         I CERTIFY THE NEED FOR THESE SERVICES FURNISHED UNDER        THIS PLAN OF TREATMENT AND WHILE UNDER MY CARE     (Physician attestation of this document indicates review and certification of the therapy plan).              Referring Provider:  Lashonda José    Initial Assessment  See Epic Evaluation- Start of Care Date: 05/22/24

## 2024-06-04 ENCOUNTER — OFFICE VISIT (OUTPATIENT)
Dept: ORTHOPEDICS | Facility: CLINIC | Age: 69
End: 2024-06-04
Payer: COMMERCIAL

## 2024-06-04 ENCOUNTER — PRE VISIT (OUTPATIENT)
Dept: ORTHOPEDICS | Facility: CLINIC | Age: 69
End: 2024-06-04

## 2024-06-04 DIAGNOSIS — M25.552 ACUTE PAIN OF LEFT HIP: ICD-10-CM

## 2024-06-04 DIAGNOSIS — G57.02 PIRIFORMIS SYNDROME OF LEFT SIDE: Primary | ICD-10-CM

## 2024-06-04 PROCEDURE — 99204 OFFICE O/P NEW MOD 45 MIN: CPT | Performed by: FAMILY MEDICINE

## 2024-06-04 NOTE — PROGRESS NOTES
CHIEF COMPLAINT:  Pain of the Left Hip     HISTORY OF PRESENT ILLNESS  Ms. Prince is a pleasant 69 year old year old female who presents to clinic today with left hip pain.  Esther explains that she has had left hip since April 2024. She did go on a walking tour shortly after onset of the pain, she would walk at least 5 miles/day. Pain has stayed mostly the same since the onset. She did begin physical therapy, which she has already noticed some improvement from x 2 visits. Pain is located over the buttocks. Pain will rarely radiate down to the knee, but nothing below. Pain is worse with sitting, walking. Improved with laying down especially knees bent.    Onset: gradual  Location: left hip  Quality:  aching and stabbing  Duration: 2 months   Severity: 4/10 at worst  Timing:intermittent episodes   Modifying factors:  resting and non-use makes it better, movement and use makes it worse  Associated signs & symptoms: pain  Previous similar pain: No  Treatments to date:Physical therapy    Additional history: as documented    Review of Systems:  Have you recently had a a fever, chills, weight loss? No  Do you have any vision problems? No  Do you have any chest pain or edema? No  Do you have any shortness of breath or wheezing?  No  Do you have stomach problems? No  Do you have any numbness or focal weakness? No  Do you have diabetes? No  Do you have problems with bleeding or clotting? No  Do you have an rashes or other skin lesions? No    MEDICAL HISTORY  Patient Active Problem List   Diagnosis    Herpes labialis    Seasonal allergic rhinitis    Health maintenance examination    Chronic lymphocytic leukemia (H)       Current Outpatient Medications   Medication Sig Dispense Refill    acyclovir (ZOVIRAX) 400 MG tablet Take 1 tablet (400 mg) by mouth 2 times daily 180 tablet 3    calcium carbonate-vitamin D3 600 mg calcium- 200 unit cap [CALCIUM CARBONATE-VITAMIN D3 600 MG CALCIUM- 200 UNIT CAP] Take by mouth.      diclofenac  sodium (VOLTAREN) 1 % Gel [DICLOFENAC SODIUM (VOLTAREN) 1 % GEL] APPLY 2 G TOPICALLY EVERY 6 (SIX) HOURS AS NEEDED. APPLY 2 GRAMS PER APPLICATION.  1    ferrous sulfate 325 (65 FE) MG tablet [FERROUS SULFATE 325 (65 FE) MG TABLET] Take 1 tablet by mouth daily with breakfast.      fexofenadine (ALLEGRA ALLERGY) 180 MG tablet [FEXOFENADINE (ALLEGRA ALLERGY) 180 MG TABLET] Take 180 mg by mouth.      fexofenadine-pseudoePHEDrine (ALLEGRA-D 24) 180-240 MG 24 hr tablet Take 1 tablet by mouth daily 314 tablet 1    fluticasone propionate (FLONASE) 50 mcg/actuation nasal spray [FLUTICASONE PROPIONATE (FLONASE) 50 MCG/ACTUATION NASAL SPRAY] 1 spray.      glucosam bernstein dip-chondroit-C-Mn 023-175-13-3 mg cap [GLUCOSAM BERNSTEIN DIP-CHONDROIT-C--232-79-3 MG CAP] Take 1 capsule by mouth.      multivitamin (ONE A DAY) per tablet [MULTIVITAMIN (ONE A DAY) PER TABLET] Take 1 tablet by mouth.      omega-3 acid ethyl esters (LOVAZA) 1 gram capsule [OMEGA-3 ACID ETHYL ESTERS (LOVAZA) 1 GRAM CAPSULE] Take 1,000 mg by mouth.      triamcinolone (KENALOG) 0.1 % paste APPLY TO TEETH 3 (THREE) TIMES A DAY FOR 5 DAYS. 5 g 1       No Known Allergies    Family History   Problem Relation Age of Onset    Arthritis Mother     Dementia Mother     Diabetes Type 2  Mother     Cerebrovascular Disease Mother     Heart Disease Father     Cerebrovascular Disease Father     Arthritis Sister     Alcoholism Brother     Cerebrovascular Disease Paternal Grandfather     Early Death Paternal Grandfather     Diabetes Type 1 Maternal Aunt     Early Death Maternal Aunt     Cerebrovascular Disease Maternal Aunt     Heart Disease Paternal Aunt     Cerebrovascular Disease Paternal Aunt     Cerebrovascular Disease Paternal Uncle     Breast Cancer No family hx of        Additional medical/Social/Surgical histories reviewed in [facility] and updated as appropriate.       PHYSICAL EXAM  LMP  (LMP Unknown)     General  - normal appearance, in no obvious distress  Musculoskeletal -  Left hip  - stance: normal gait without limp, normal single leg squat, no obvious leg length discrepancy, normal heel and toe walk  - inspection: no swelling or effusion,  normal bone and joint alignment, no obvious deformity  - palpation: no lateral or anterior hip tenderness. Tenderness at piriformis posterior central buttock. Tender sciatic notch.  - ROM: Decrease ER 45, IR 25 degrees full, flexion and extension full.  - strength: 5/5 in all planes. No pain with resisted ER of hip.  - special tests:  (-) ELI  (-) FADIR  no pain with axial femoral load  Neuro  - no sensory or motor deficit, grossly normal coordination, normal muscle tone    IMAGING : XR hip left 2 views. Final results and radiologist's interpretation, available in the [facility] health record. Images were reviewed with the patient/family members in the office today. My personal interpretation of the performed imaging is no acute osseous abnormality or significant degenerative changes of joint.     ASSESSMENT & PLAN  Ms. Prince is a 69 year old year old female who presents to clinic today with subacute left hip pain.    Clinical examination today as well as reassuring radiographs most consistent with piriformis syndrome of left posterior hip.  Additionally discussed possibility of lumbar degenerative disc disease/lumbar radiculitis referring pain to the central buttock.  However she is quite tender along the entire origin through insertion of piriformis.    Diagnosis: Piriformis syndrome of left hip, acute pain of left hip    Treatment options discussed including ongoing physical therapy, oral as well as additional injections, as well as advanced imaging.  Given improvement to date with physical therapy I would like her to continue on with stretching and strengthening.  I emphasize use of foam roller to target piriformis in isolation.  Additionally we discussed if no improvement is noted over the next 4 weeks, we can then review consideration for  piriformis injection.  We discussed muscle relaxant such as tizanidine and ultimately decided against it.  She can continue to use ibuprofen as needed as well as Tylenol.        It was a pleasure seeing Esther today.    John Chaves DO, CAM  Primary Care Sports Medicine

## 2024-06-04 NOTE — LETTER
6/4/2024      RE: Esther Prince  [address]  [place] [address]     Dear Colleague,    Thank you for referring your patient, Esther Prince, to the [facility] [place]. Please see a copy of my visit note below.    CHIEF COMPLAINT:  Pain of the Left Hip     HISTORY OF PRESENT ILLNESS  Ms. Prince is a pleasant 69 year old year old female who presents to clinic today with left hip pain.  Esther explains that she has had left hip since April 2024. She did go on a walking tour shortly after onset of the pain, she would walk at least 5 miles/day. Pain has stayed mostly the same since the onset. She did begin physical therapy, which she has already noticed some improvement from x 2 visits. Pain is located over the buttocks. Pain will rarely radiate down to the knee, but nothing below. Pain is worse with sitting, walking. Improved with laying down especially knees bent.    Onset: gradual  Location: left hip  Quality:  aching and stabbing  Duration: 2 months   Severity: 4/10 at worst  Timing:intermittent episodes   Modifying factors:  resting and non-use makes it better, movement and use makes it worse  Associated signs & symptoms: pain  Previous similar pain: No  Treatments to date:Physical therapy    Additional history: as documented    Review of Systems:  Have you recently had a a fever, chills, weight loss? No  Do you have any vision problems? No  Do you have any chest pain or edema? No  Do you have any shortness of breath or wheezing?  No  Do you have stomach problems? No  Do you have any numbness or focal weakness? No  Do you have diabetes? No  Do you have problems with bleeding or clotting? No  Do you have an rashes or other skin lesions? No    MEDICAL HISTORY  Patient Active Problem List   Diagnosis     Herpes labialis     Seasonal allergic rhinitis     Health maintenance examination     Chronic lymphocytic leukemia (H)       Current Outpatient Medications   Medication  Sig Dispense Refill     acyclovir (ZOVIRAX) 400 MG tablet Take 1 tablet (400 mg) by mouth 2 times daily 180 tablet 3     calcium carbonate-vitamin D3 600 mg calcium- 200 unit cap [CALCIUM CARBONATE-VITAMIN D3 600 MG CALCIUM- 200 UNIT CAP] Take by mouth.       diclofenac sodium (VOLTAREN) 1 % Gel [DICLOFENAC SODIUM (VOLTAREN) 1 % GEL] APPLY 2 G TOPICALLY EVERY 6 (SIX) HOURS AS NEEDED. APPLY 2 GRAMS PER APPLICATION.  1     ferrous sulfate 325 (65 FE) MG tablet [FERROUS SULFATE 325 (65 FE) MG TABLET] Take 1 tablet by mouth daily with breakfast.       fexofenadine (ALLEGRA ALLERGY) 180 MG tablet [FEXOFENADINE (ALLEGRA ALLERGY) 180 MG TABLET] Take 180 mg by mouth.       fexofenadine-pseudoePHEDrine (ALLEGRA-D 24) 180-240 MG 24 hr tablet Take 1 tablet by mouth daily 314 tablet 1     fluticasone propionate (FLONASE) 50 mcg/actuation nasal spray [FLUTICASONE PROPIONATE (FLONASE) 50 MCG/ACTUATION NASAL SPRAY] 1 spray.       glucosam bernstein dip-chondroit-C-Mn 406-820-36-3 mg cap [GLUCOSAM BERNSTEIN DIP-CHONDROIT-C--605-44-3 MG CAP] Take 1 capsule by mouth.       multivitamin (ONE A DAY) per tablet [MULTIVITAMIN (ONE A DAY) PER TABLET] Take 1 tablet by mouth.       omega-3 acid ethyl esters (LOVAZA) 1 gram capsule [OMEGA-3 ACID ETHYL ESTERS (LOVAZA) 1 GRAM CAPSULE] Take 1,000 mg by mouth.       triamcinolone (KENALOG) 0.1 % paste APPLY TO TEETH 3 (THREE) TIMES A DAY FOR 5 DAYS. 5 g 1       No Known Allergies    Family History   Problem Relation Age of Onset     Arthritis Mother      Dementia Mother      Diabetes Type 2  Mother      Cerebrovascular Disease Mother      Heart Disease Father      Cerebrovascular Disease Father      Arthritis Sister      Alcoholism Brother      Cerebrovascular Disease Paternal Grandfather      Early Death Paternal Grandfather      Diabetes Type 1 Maternal Aunt      Early Death Maternal Aunt      Cerebrovascular Disease Maternal Aunt      Heart Disease Paternal Aunt      Cerebrovascular Disease Paternal  Aunt      Cerebrovascular Disease Paternal Uncle      Breast Cancer No family hx of        Additional medical/Social/Surgical histories reviewed in [facility] and updated as appropriate.       PHYSICAL EXAM  LMP  (LMP Unknown)     General  - normal appearance, in no obvious distress  Musculoskeletal - Left hip  - stance: normal gait without limp, normal single leg squat, no obvious leg length discrepancy, normal heel and toe walk  - inspection: no swelling or effusion,  normal bone and joint alignment, no obvious deformity  - palpation: no lateral or anterior hip tenderness. Tenderness at piriformis posterior central buttock. Tender sciatic notch.  - ROM: Decrease ER 45, IR 25 degrees full, flexion and extension full.  - strength: 5/5 in all planes. No pain with resisted ER of hip.  - special tests:  (-) ELI  (-) FADIR  no pain with axial femoral load  Neuro  - no sensory or motor deficit, grossly normal coordination, normal muscle tone    IMAGING : XR hip left 2 views. Final results and radiologist's interpretation, available in the [facility] health record. Images were reviewed with the patient/family members in the office today. My personal interpretation of the performed imaging is no acute osseous abnormality or significant degenerative changes of joint.     ASSESSMENT & PLAN  Ms. Prince is a 69 year old year old female who presents to clinic today with subacute left hip pain.    Clinical examination today as well as reassuring radiographs most consistent with piriformis syndrome of left posterior hip.  Additionally discussed possibility of lumbar degenerative disc disease/lumbar radiculitis referring pain to the central buttock.  However she is quite tender along the entire origin through insertion of piriformis.    Diagnosis: Piriformis syndrome of left hip, acute pain of left hip    Treatment options discussed including ongoing physical therapy, oral as well as additional injections, as well as advanced imaging.  Given  improvement to date with physical therapy I would like her to continue on with stretching and strengthening.  I emphasize use of foam roller to target piriformis in isolation.  Additionally we discussed if no improvement is noted over the next 4 weeks, we can then review consideration for piriformis injection.  We discussed muscle relaxant such as tizanidine and ultimately decided against it.  She can continue to use ibuprofen as needed as well as Tylenol.        It was a pleasure seeing Esther today.    John Chaves DO, CAQSM  Primary Care Sports Medicine

## 2024-06-04 NOTE — TELEPHONE ENCOUNTER
DIAGNOSIS: XR 5/16/24. LEFT hip pain / Lashonda José MD in [facility] INTERNAL MEDICINE / [employer] Medicare / Imaging ON FILE / PTO ON FILE     APPOINTMENT DATE: 6.4.24   NOTES STATUS DETAILS   OFFICE NOTE from referring provider Internal 5.16.24  Arnav  IM   MEDICATION LIST Internal    XRAYS (IMAGES & REPORTS) Internal 5.16.24  XR Hip Left

## 2024-06-12 ENCOUNTER — THERAPY VISIT (OUTPATIENT)
Dept: PHYSICAL THERAPY | Facility: CLINIC | Age: 69
End: 2024-06-12
Payer: COMMERCIAL

## 2024-06-12 DIAGNOSIS — M25.552 HIP PAIN, LEFT: Primary | ICD-10-CM

## 2024-06-12 PROCEDURE — 97110 THERAPEUTIC EXERCISES: CPT | Mod: GP

## 2024-06-12 PROCEDURE — 97140 MANUAL THERAPY 1/> REGIONS: CPT | Mod: GP

## 2024-06-26 ENCOUNTER — THERAPY VISIT (OUTPATIENT)
Dept: PHYSICAL THERAPY | Facility: CLINIC | Age: 69
End: 2024-06-26
Payer: COMMERCIAL

## 2024-06-26 DIAGNOSIS — M25.552 HIP PAIN, LEFT: Primary | ICD-10-CM

## 2024-06-26 PROCEDURE — 97140 MANUAL THERAPY 1/> REGIONS: CPT | Mod: GP

## 2024-06-26 PROCEDURE — 97110 THERAPEUTIC EXERCISES: CPT | Mod: GP

## 2024-06-27 ENCOUNTER — MYC MEDICAL ADVICE (OUTPATIENT)
Dept: INTERNAL MEDICINE | Facility: CLINIC | Age: 69
End: 2024-06-27
Payer: COMMERCIAL

## 2024-10-17 ENCOUNTER — OFFICE VISIT (OUTPATIENT)
Dept: INTERNAL MEDICINE | Facility: CLINIC | Age: 69
End: 2024-10-17
Payer: COMMERCIAL

## 2024-10-17 VITALS
BODY MASS INDEX: 21.92 KG/M2 | WEIGHT: 136.4 LBS | HEIGHT: 66 IN | TEMPERATURE: 97.7 F | OXYGEN SATURATION: 93 % | RESPIRATION RATE: 14 BRPM | SYSTOLIC BLOOD PRESSURE: 103 MMHG | HEART RATE: 69 BPM | DIASTOLIC BLOOD PRESSURE: 66 MMHG

## 2024-10-17 DIAGNOSIS — Z00.00 HEALTH MAINTENANCE EXAMINATION: Primary | ICD-10-CM

## 2024-10-17 DIAGNOSIS — H61.23 BILATERAL IMPACTED CERUMEN: ICD-10-CM

## 2024-10-17 DIAGNOSIS — C91.10 CLL (CHRONIC LYMPHOCYTIC LEUKEMIA) (H): ICD-10-CM

## 2024-10-17 DIAGNOSIS — E55.9 VITAMIN D DEFICIENCY: ICD-10-CM

## 2024-10-17 DIAGNOSIS — R73.03 PREDIABETES: ICD-10-CM

## 2024-10-17 LAB
ALBUMIN SERPL BCG-MCNC: 4.4 G/DL (ref 3.5–5.2)
ALP SERPL-CCNC: 61 U/L (ref 40–150)
ALT SERPL W P-5'-P-CCNC: 10 U/L (ref 0–50)
ANION GAP SERPL CALCULATED.3IONS-SCNC: 11 MMOL/L (ref 7–15)
AST SERPL W P-5'-P-CCNC: 20 U/L (ref 0–45)
BASOPHILS # BLD AUTO: 0.1 10E3/UL (ref 0–0.2)
BASOPHILS NFR BLD AUTO: 0 %
BILIRUB SERPL-MCNC: 0.6 MG/DL
BUN SERPL-MCNC: 15.7 MG/DL (ref 8–23)
CALCIUM SERPL-MCNC: 9.8 MG/DL (ref 8.8–10.4)
CHLORIDE SERPL-SCNC: 102 MMOL/L (ref 98–107)
CHOLEST SERPL-MCNC: 207 MG/DL
CREAT SERPL-MCNC: 0.95 MG/DL (ref 0.51–0.95)
EGFRCR SERPLBLD CKD-EPI 2021: 64 ML/MIN/1.73M2
EOSINOPHIL # BLD AUTO: 0.1 10E3/UL (ref 0–0.7)
EOSINOPHIL NFR BLD AUTO: 0 %
ERYTHROCYTE [DISTWIDTH] IN BLOOD BY AUTOMATED COUNT: 12.1 % (ref 10–15)
EST. AVERAGE GLUCOSE BLD GHB EST-MCNC: 117 MG/DL
FASTING STATUS PATIENT QL REPORTED: YES
FASTING STATUS PATIENT QL REPORTED: YES
GLUCOSE SERPL-MCNC: 93 MG/DL (ref 70–99)
HBA1C MFR BLD: 5.7 % (ref 0–5.6)
HCO3 SERPL-SCNC: 25 MMOL/L (ref 22–29)
HCT VFR BLD AUTO: 40.9 % (ref 35–47)
HDLC SERPL-MCNC: 78 MG/DL
HGB BLD-MCNC: 13.4 G/DL (ref 11.7–15.7)
IMM GRANULOCYTES # BLD: 0.1 10E3/UL
IMM GRANULOCYTES NFR BLD: 1 %
LDLC SERPL CALC-MCNC: 118 MG/DL
LYMPHOCYTES # BLD AUTO: 15.9 10E3/UL (ref 0.8–5.3)
LYMPHOCYTES NFR BLD AUTO: 74 %
MCH RBC QN AUTO: 30.6 PG (ref 26.5–33)
MCHC RBC AUTO-ENTMCNC: 32.8 G/DL (ref 31.5–36.5)
MCV RBC AUTO: 93 FL (ref 78–100)
MONOCYTES # BLD AUTO: 0.7 10E3/UL (ref 0–1.3)
MONOCYTES NFR BLD AUTO: 3 %
NEUTROPHILS # BLD AUTO: 4.6 10E3/UL (ref 1.6–8.3)
NEUTROPHILS NFR BLD AUTO: 21 %
NONHDLC SERPL-MCNC: 129 MG/DL
NRBC # BLD AUTO: 0 10E3/UL
NRBC BLD AUTO-RTO: 0 /100
PLAT MORPH BLD: ABNORMAL
PLATELET # BLD AUTO: 249 10E3/UL (ref 150–450)
POTASSIUM SERPL-SCNC: 5 MMOL/L (ref 3.4–5.3)
PROT SERPL-MCNC: 6.9 G/DL (ref 6.4–8.3)
RBC # BLD AUTO: 4.38 10E6/UL (ref 3.8–5.2)
RBC MORPH BLD: ABNORMAL
SMUDGE CELLS BLD QL SMEAR: PRESENT
SODIUM SERPL-SCNC: 138 MMOL/L (ref 135–145)
TRIGL SERPL-MCNC: 54 MG/DL
TSH SERPL DL<=0.005 MIU/L-ACNC: 1.32 UIU/ML (ref 0.3–4.2)
VARIANT LYMPHS BLD QL SMEAR: PRESENT
VIT D+METAB SERPL-MCNC: 37 NG/ML (ref 20–50)
WBC # BLD AUTO: 21.4 10E3/UL (ref 4–11)

## 2024-10-17 PROCEDURE — 36415 COLL VENOUS BLD VENIPUNCTURE: CPT | Performed by: INTERNAL MEDICINE

## 2024-10-17 PROCEDURE — 99214 OFFICE O/P EST MOD 30 MIN: CPT | Mod: 25 | Performed by: INTERNAL MEDICINE

## 2024-10-17 PROCEDURE — 99397 PER PM REEVAL EST PAT 65+ YR: CPT | Performed by: INTERNAL MEDICINE

## 2024-10-17 PROCEDURE — 80061 LIPID PANEL: CPT | Performed by: INTERNAL MEDICINE

## 2024-10-17 PROCEDURE — 84443 ASSAY THYROID STIM HORMONE: CPT | Performed by: INTERNAL MEDICINE

## 2024-10-17 PROCEDURE — 69209 REMOVE IMPACTED EAR WAX UNI: CPT | Mod: 50 | Performed by: INTERNAL MEDICINE

## 2024-10-17 PROCEDURE — 83036 HEMOGLOBIN GLYCOSYLATED A1C: CPT | Performed by: INTERNAL MEDICINE

## 2024-10-17 PROCEDURE — 80053 COMPREHEN METABOLIC PANEL: CPT | Performed by: INTERNAL MEDICINE

## 2024-10-17 PROCEDURE — 82306 VITAMIN D 25 HYDROXY: CPT | Performed by: INTERNAL MEDICINE

## 2024-10-17 PROCEDURE — 85025 COMPLETE CBC W/AUTO DIFF WBC: CPT | Performed by: INTERNAL MEDICINE

## 2024-10-17 SDOH — HEALTH STABILITY: PHYSICAL HEALTH: ON AVERAGE, HOW MANY MINUTES DO YOU ENGAGE IN EXERCISE AT THIS LEVEL?: 60 MIN

## 2024-10-17 SDOH — HEALTH STABILITY: PHYSICAL HEALTH: ON AVERAGE, HOW MANY DAYS PER WEEK DO YOU ENGAGE IN MODERATE TO STRENUOUS EXERCISE (LIKE A BRISK WALK)?: 6 DAYS

## 2024-10-17 ASSESSMENT — PAIN SCALES - GENERAL: PAINLEVEL: MODERATE PAIN (4)

## 2024-10-17 ASSESSMENT — SOCIAL DETERMINANTS OF HEALTH (SDOH): HOW OFTEN DO YOU GET TOGETHER WITH FRIENDS OR RELATIVES?: ONCE A WEEK

## 2024-10-17 NOTE — PROGRESS NOTES
Preventive Care Visit  [facility]  Lashonda José MD, Internal Medicine  Oct 17, 2024      Assessment & Plan     Health maintenance examination  Colon 2008 no polyps 2019 negative next in 10 years   mammo annual   dexa 2019 , 2022  Pap 2019 normal .     CL L (chronic lymphocytic leukemia) (H)  Monitoring  no treatment wbc count 15-18     Prediabetes  Lab Results   Component Value Date    A1C 5.7 10/17/2024    A1C 5.6 12/13/2023    A1C 5.9 08/29/2023    A1C 5.5 10/22/2021     Stable   - Hemoglobin A1c; Future  - TSH; Future  - Comprehensive metabolic panel; Future  - CBC with platelets and differential; Future  - Lipid panel reflex to direct LDL Fasting; Future  - Hemoglobin A1c  - TSH  - Comprehensive metabolic panel  - CBC with platelets and differential  - Lipid panel reflex to direct LDL Fasting    Vitamin D deficiency    - Vitamin D Deficiency; Future  - Lipid panel reflex to direct LDL Fasting; Future  - Vitamin D Deficiency  - Lipid panel reflex to direct LDL Fasting    Bilateral impacted cerumen   Ear wash today no hearing loss   Overall in excellent health no concerns     Excellent lipids   chestCT 2023 showed no coronary calcificaton         Counseling  Appropriate preventive services were addressed with this patient via screening, questionnaire, or discussion as appropriate for fall prevention, nutrition, physical activity, Tobacco-use cessation, social engagement, weight loss and cognition.  Checklist reviewing preventive services available has been given to the patient.  Reviewed patient's diet, addressing concerns and/or questions.           Felix Santana is a 69 year old, presenting for the following:  Physical  Augmentin caused diarrhea ( tooth related )       10/17/2024     7:59 AM   Additional Questions   Roomed by MIGEL BARRETT         10/17/2024     8:04 AM   Patient Reported Additional Medications   Patient reports taking the following new medications PEPTO EVERYOTHER EVENING          Health Care Directive  Patient does not have a Health Care Directive or Living Will: Patient states has Advance Directive and will bring in a copy to clinic.    HPI    Current Outpatient Medications   Medication Sig Dispense Refill    acyclovir (ZOVIRAX) 400 MG tablet Take 1 tablet (400 mg) by mouth 2 times daily 180 tablet 3    calcium carbonate-vitamin D3 600 mg calcium- 200 unit cap [CALCIUM CARBONATE-VITAMIN D3 600 MG CALCIUM- 200 UNIT CAP] Take by mouth.      diclofenac sodium (VOLTAREN) 1 % Gel [DICLOFENAC SODIUM (VOLTAREN) 1 % GEL] APPLY 2 G TOPICALLY EVERY 6 (SIX) HOURS AS NEEDED. APPLY 2 GRAMS PER APPLICATION.  1    ferrous sulfate 325 (65 FE) MG tablet [FERROUS SULFATE 325 (65 FE) MG TABLET] Take 1 tablet by mouth daily with breakfast.      fexofenadine (ALLEGRA ALLERGY) 180 MG tablet [FEXOFENADINE (ALLEGRA ALLERGY) 180 MG TABLET] Take 180 mg by mouth.      fexofenadine-pseudoePHEDrine (ALLEGRA-D 24) 180-240 MG 24 hr tablet Take 1 tablet by mouth daily 314 tablet 1    fluticasone propionate (FLONASE) 50 mcg/actuation nasal spray [FLUTICASONE PROPIONATE (FLONASE) 50 MCG/ACTUATION NASAL SPRAY] 1 spray.      glucosam bernstein dip-chondroit-C-Mn 168-964-29-3 mg cap [GLUCOSAM BERNSTEIN DIP-CHONDROIT-C--698-26-3 MG CAP] Take 1 capsule by mouth.      multivitamin (ONE A DAY) per tablet [MULTIVITAMIN (ONE A DAY) PER TABLET] Take 1 tablet by mouth.      omega-3 acid ethyl esters (LOVAZA) 1 gram capsule [OMEGA-3 ACID ETHYL ESTERS (LOVAZA) 1 GRAM CAPSULE] Take 1,000 mg by mouth.      triamcinolone (KENALOG) 0.1 % paste APPLY TO TEETH 3 (THREE) TIMES A DAY FOR 5 DAYS. 5 g 1     No current facility-administered medications for this visit.                   10/17/2024   General Health   How would you rate your overall physical health? Good   Feel stress (tense, anxious, or unable to sleep) To some extent      (!) STRESS CONCERN      10/17/2024   Nutrition   Diet: Regular (no restrictions)            10/17/2024    Exercise   Days per week of moderate/strenous exercise 6 days   Average minutes spent exercising at this level 60 min            10/17/2024   Social Factors   Frequency of gathering with friends or relatives Once a week   Worry food won't last until get money to buy more No   Food not last or not have enough money for food? No   Do you have housing? (Housing is defined as stable permanent housing and does not include staying ouside in a car, in a tent, in an abandoned building, in an overnight shelter, or couch-surfing.) Yes   Are you worried about losing your housing? No   Lack of transportation? No   Unable to get utilities (heat,electricity)? No            10/17/2024   Fall Risk   Fallen 2 or more times in the past year? No   Trouble with walking or balance? No             10/17/2024   Activities of Daily Living- Home Safety   Needs help with the following daily activites None of the above   Safety concerns in the home None of the above            10/17/2024   Dental   Dentist two times every year? Yes            10/17/2024   Hearing Screening   Hearing concerns? None of the above            10/17/2024   Driving Risk Screening   Patient/family members have concerns about driving No            10/17/2024   General Alertness/Fatigue Screening   Have you been more tired than usual lately? No            10/17/2024   Urinary Incontinence Screening   Bothered by leaking urine in past 6 months No            10/17/2024   TB Screening   Were you born outside of the [place]? No            Today's PHQ-2 Score:       10/17/2024     7:05 AM   PHQ-2 ( 1999 [employer])   Q1: Little interest or pleasure in doing things 0   Q2: Feeling down, depressed or hopeless 0   PHQ-2 Score 0   Q1: Little interest or pleasure in doing things Not at all   Q2: Feeling down, depressed or hopeless Not at all   PHQ-2 Score 0           10/17/2024   Substance Use   Alcohol more than 3/day or more than 7/wk No   Do you have a current opioid prescription?  No   How severe/bad is pain from 1 to 10? 2/10   Do you use any other substances recreationally? No        Social History     Tobacco Use    Smoking status: Never    Smokeless tobacco: Never   Vaping Use    Vaping status: Never Used   Substance Use Topics    Alcohol use: Yes     Alcohol/week: 7.0 standard drinks of alcohol    Drug use: Never           3/7/2024   LAST FHS-7 RESULTS   1st degree relative breast or ovarian cancer No   Any relative bilateral breast cancer No   Any male have breast cancer No   Any ONE woman have BOTH breast AND ovarian cancer No   Any woman with breast cancer before 50yrs No   2 or more relatives with breast AND/OR ovarian cancer No   2 or more relatives with breast AND/OR bowel cancer No               ASCVD Risk   The 10-year ASCVD risk score (Maria Teresa THOMAS, et al., 2019) is: 6.7%    Values used to calculate the score:      Age: 69 years      Sex: Female      Is Non- : No      Diabetic: No      Tobacco smoker: No      Systolic Blood Pressure: 103 mmHg      Is BP treated: No      HDL Cholesterol: 77 mg/dL      Total Cholesterol: 205 mg/dL            Reviewed and updated as needed this visit by Provider                      Current providers sharing in care for this patient include:  Patient Care Team:  Lashonda José MD as PCP - General (Internal Medicine)  Lashonda José MD as Assigned PCP  Thomas Soni MD as MD (Otolaryngology)  Cassius Garsia MD as Assigned Cancer Care Provider  John Chaves DO as Assigned Musculoskeletal Provider    The following health maintenance items are reviewed in Epic and correct as of today:  Health Maintenance   Topic Date Due    ANNUAL REVIEW OF HM ORDERS  08/29/2024    INFLUENZA VACCINE (1) 09/01/2024    COVID-19 Vaccine (9 - 2024-25 season) 10/23/2024    MEDICARE ANNUAL WELLNESS VISIT  12/13/2024    FALL RISK ASSESSMENT  10/17/2025    MAMMO SCREENING  03/07/2026    GLUCOSE  12/13/2026    COLORECTAL CANCER SCREENING   01/11/2028    LIPID  08/29/2028    ADVANCE CARE PLANNING  12/13/2028    DTAP/TDAP/TD IMMUNIZATION (5 - Td or Tdap) 10/01/2033    DEXA  11/15/2037    HEPATITIS C SCREENING  Completed    PHQ-2 (once per calendar year)  Completed    Pneumococcal Vaccine: 65+ Years  Completed    ZOSTER IMMUNIZATION  Completed    RSV VACCINE  Completed    HPV IMMUNIZATION  Aged Out    MENINGITIS IMMUNIZATION  Aged Out    RSV MONOCLONAL ANTIBODY  Aged Out       Current Outpatient Medications   Medication Sig Dispense Refill    acyclovir (ZOVIRAX) 400 MG tablet Take 1 tablet (400 mg) by mouth 2 times daily 180 tablet 3    calcium carbonate-vitamin D3 600 mg calcium- 200 unit cap [CALCIUM CARBONATE-VITAMIN D3 600 MG CALCIUM- 200 UNIT CAP] Take by mouth.      diclofenac sodium (VOLTAREN) 1 % Gel [DICLOFENAC SODIUM (VOLTAREN) 1 % GEL] APPLY 2 G TOPICALLY EVERY 6 (SIX) HOURS AS NEEDED. APPLY 2 GRAMS PER APPLICATION.  1    ferrous sulfate 325 (65 FE) MG tablet [FERROUS SULFATE 325 (65 FE) MG TABLET] Take 1 tablet by mouth daily with breakfast.      fexofenadine (ALLEGRA ALLERGY) 180 MG tablet [FEXOFENADINE (ALLEGRA ALLERGY) 180 MG TABLET] Take 180 mg by mouth.      fexofenadine-pseudoePHEDrine (ALLEGRA-D 24) 180-240 MG 24 hr tablet Take 1 tablet by mouth daily 314 tablet 1    fluticasone propionate (FLONASE) 50 mcg/actuation nasal spray [FLUTICASONE PROPIONATE (FLONASE) 50 MCG/ACTUATION NASAL SPRAY] 1 spray.      glucosam bernstein dip-chondroit-C-Mn 625-378-63-3 mg cap [GLUCOSAM BERNSTEIN DIP-CHONDROIT-C--357-32-3 MG CAP] Take 1 capsule by mouth.      multivitamin (ONE A DAY) per tablet [MULTIVITAMIN (ONE A DAY) PER TABLET] Take 1 tablet by mouth.      omega-3 acid ethyl esters (LOVAZA) 1 gram capsule [OMEGA-3 ACID ETHYL ESTERS (LOVAZA) 1 GRAM CAPSULE] Take 1,000 mg by mouth.      triamcinolone (KENALOG) 0.1 % paste APPLY TO TEETH 3 (THREE) TIMES A DAY FOR 5 DAYS. 5 g 1     No current facility-administered medications for this visit.         "  Objective    Exam  /66 (BP Location: Left arm, Patient Position: Sitting, Cuff Size: Adult Regular)   Pulse 69   Temp 97.7  F (36.5  C)   Resp 14   Ht 1.664 m (5' 5.5\")   Wt 61.9 kg (136 lb 6.4 oz)   LMP  (LMP Unknown)   SpO2 93%   BMI 22.35 kg/m     Estimated body mass index is 22.35 kg/m  as calculated from the following:    Height as of this encounter: 1.664 m (5' 5.5\").    Weight as of this encounter: 61.9 kg (136 lb 6.4 oz).    Physical Exam  GENERAL: alert and no distress  NECK: no adenopathy, no asymmetry, masses, or scars  RESP: lungs clear to auscultation - no rales, rhonchi or wheezes  BREAST: normal without masses, tenderness or nipple discharge and no palpable axillary masses or adenopathy  CV: regular rate and rhythm, normal S1 S2, no S3 or S4, no murmur, click or rub, no peripheral edema  ABDOMEN: soft, nontender, no hepatosplenomegaly, no masses and bowel sounds normal  MS: no gross musculoskeletal defects noted, no edema        10/17/2024   Mini Cog   Clock Draw Score 2 Normal   3 Item Recall 3 objects recalled   Mini Cog Total Score 5             Patient identified using two patient identifiers.  Ear exam showing wax occlusion completed by VARSHA Kruse.  Solution: warm water was placed in the bilateral ear(s) via irrigation tool: elephant ear. Impacted cerumen on right ear removed successfully with ear curette and sterile cotton swap. Impacted cerumen on left ear removed successfully with ear irrigation, ear curette, and sterile cotton swap. Patient tolerated procedure well without any complications and no bleeding noted post irrigation.       Signed Electronically by: Lashonda José MD    " 05-Jan-2020 15:32

## 2024-10-29 ENCOUNTER — DOCUMENTATION ONLY (OUTPATIENT)
Dept: OTHER | Facility: CLINIC | Age: 69
End: 2024-10-29
Payer: COMMERCIAL

## 2025-01-07 ENCOUNTER — E-VISIT (OUTPATIENT)
Dept: URGENT CARE | Facility: CLINIC | Age: 70
End: 2025-01-07
Payer: COMMERCIAL

## 2025-01-07 DIAGNOSIS — R30.0 DIFFICULT OR PAINFUL URINATION: Primary | ICD-10-CM

## 2025-01-07 NOTE — PATIENT INSTRUCTIONS
Dear Esther Prince,     After reviewing your responses, I would like you to come in for a urine test to make sure we treat you correctly. This urine test is to evaluate you for a possible urinary tract infection, and should be scheduled for today or tomorrow. Schedule a Lab Only appointment here.     Lab appointments are not available at most locations on the weekends. If no Lab Only appointment is available, you should be seen in any of our convenient Walk-in or Urgent Care Centers, which can be found on our website here.     You will receive instructions with your results in [employer] once they are available.     If your symptoms worsen, you develop pain in your back or stomach, develop fevers, or are not improving in 5 days, please contact your primary care provider for an appointment or visit a Walk-in or Urgent Care Center to be seen.     Thanks again for choosing us as your health care partner,     PETRA Guardado CNP

## 2025-01-08 ENCOUNTER — LAB (OUTPATIENT)
Dept: LAB | Facility: CLINIC | Age: 70
End: 2025-01-08
Payer: COMMERCIAL

## 2025-01-08 DIAGNOSIS — R30.0 DIFFICULT OR PAINFUL URINATION: ICD-10-CM

## 2025-01-08 LAB
ALBUMIN UR-MCNC: NEGATIVE MG/DL
APPEARANCE UR: CLEAR
BILIRUB UR QL STRIP: NEGATIVE
CLUE CELLS: ABNORMAL
COLOR UR AUTO: YELLOW
GLUCOSE UR STRIP-MCNC: NEGATIVE MG/DL
HGB UR QL STRIP: NEGATIVE
KETONES UR STRIP-MCNC: NEGATIVE MG/DL
LEUKOCYTE ESTERASE UR QL STRIP: NEGATIVE
NITRATE UR QL: NEGATIVE
PH UR STRIP: 5.5 [PH] (ref 5–8)
SP GR UR STRIP: 1.02 (ref 1–1.03)
TRICHOMONAS, WET PREP: ABNORMAL
UROBILINOGEN UR STRIP-ACNC: 0.2 E.U./DL
WBC'S/HIGH POWER FIELD, WET PREP: ABNORMAL
YEAST, WET PREP: ABNORMAL

## 2025-01-08 PROCEDURE — 87210 SMEAR WET MOUNT SALINE/INK: CPT

## 2025-01-08 PROCEDURE — 81003 URINALYSIS AUTO W/O SCOPE: CPT

## 2025-02-18 ENCOUNTER — LAB (OUTPATIENT)
Dept: LAB | Facility: CLINIC | Age: 70
End: 2025-02-18
Payer: COMMERCIAL

## 2025-02-18 DIAGNOSIS — C91.10 CHRONIC LYMPHOCYTIC LEUKEMIA (H): ICD-10-CM

## 2025-02-18 LAB
ALBUMIN SERPL BCG-MCNC: 4.5 G/DL (ref 3.5–5.2)
ALP SERPL-CCNC: 68 U/L (ref 40–150)
ALT SERPL W P-5'-P-CCNC: 22 U/L (ref 0–50)
ANION GAP SERPL CALCULATED.3IONS-SCNC: 10 MMOL/L (ref 7–15)
AST SERPL W P-5'-P-CCNC: 30 U/L (ref 0–45)
BASOPHILS # BLD AUTO: 0.1 10E3/UL (ref 0–0.2)
BASOPHILS NFR BLD AUTO: 0 %
BILIRUB SERPL-MCNC: 0.6 MG/DL
BUN SERPL-MCNC: 16.8 MG/DL (ref 8–23)
CALCIUM SERPL-MCNC: 9.6 MG/DL (ref 8.8–10.4)
CHLORIDE SERPL-SCNC: 106 MMOL/L (ref 98–107)
CREAT SERPL-MCNC: 0.96 MG/DL (ref 0.51–0.95)
EGFRCR SERPLBLD CKD-EPI 2021: 63 ML/MIN/1.73M2
EOSINOPHIL # BLD AUTO: 0.1 10E3/UL (ref 0–0.7)
EOSINOPHIL NFR BLD AUTO: 1 %
ERYTHROCYTE [DISTWIDTH] IN BLOOD BY AUTOMATED COUNT: 12.6 % (ref 10–15)
GLUCOSE SERPL-MCNC: 86 MG/DL (ref 70–99)
HCO3 SERPL-SCNC: 26 MMOL/L (ref 22–29)
HCT VFR BLD AUTO: 41.3 % (ref 35–47)
HGB BLD-MCNC: 13.4 G/DL (ref 11.7–15.7)
IMM GRANULOCYTES # BLD: 0.1 10E3/UL
IMM GRANULOCYTES NFR BLD: 0 %
LYMPHOCYTES # BLD AUTO: 12.5 10E3/UL (ref 0.8–5.3)
LYMPHOCYTES NFR BLD AUTO: 79 %
MCH RBC QN AUTO: 30 PG (ref 26.5–33)
MCHC RBC AUTO-ENTMCNC: 32.4 G/DL (ref 31.5–36.5)
MCV RBC AUTO: 93 FL (ref 78–100)
MONOCYTES # BLD AUTO: 0.5 10E3/UL (ref 0–1.3)
MONOCYTES NFR BLD AUTO: 3 %
NEUTROPHILS # BLD AUTO: 2.6 10E3/UL (ref 1.6–8.3)
NEUTROPHILS NFR BLD AUTO: 17 %
NRBC # BLD AUTO: 0 10E3/UL
NRBC BLD AUTO-RTO: 0 /100
PLAT MORPH BLD: ABNORMAL
PLATELET # BLD AUTO: 203 10E3/UL (ref 150–450)
POTASSIUM SERPL-SCNC: 4.3 MMOL/L (ref 3.4–5.3)
PROT SERPL-MCNC: 6.7 G/DL (ref 6.4–8.3)
RBC # BLD AUTO: 4.46 10E6/UL (ref 3.8–5.2)
RBC MORPH BLD: ABNORMAL
SMUDGE CELLS BLD QL SMEAR: PRESENT
SODIUM SERPL-SCNC: 142 MMOL/L (ref 135–145)
VARIANT LYMPHS BLD QL SMEAR: PRESENT
WBC # BLD AUTO: 15.8 10E3/UL (ref 4–11)

## 2025-02-18 PROCEDURE — 80053 COMPREHEN METABOLIC PANEL: CPT

## 2025-02-18 PROCEDURE — 85025 COMPLETE CBC W/AUTO DIFF WBC: CPT

## 2025-02-18 PROCEDURE — 36415 COLL VENOUS BLD VENIPUNCTURE: CPT

## 2025-03-19 ENCOUNTER — ONCOLOGY VISIT (OUTPATIENT)
Dept: ONCOLOGY | Facility: HOSPITAL | Age: 70
End: 2025-03-19
Payer: COMMERCIAL

## 2025-03-19 VITALS
WEIGHT: 136.2 LBS | HEART RATE: 61 BPM | SYSTOLIC BLOOD PRESSURE: 115 MMHG | TEMPERATURE: 97.7 F | OXYGEN SATURATION: 99 % | BODY MASS INDEX: 22.32 KG/M2 | RESPIRATION RATE: 16 BRPM | DIASTOLIC BLOOD PRESSURE: 64 MMHG

## 2025-03-19 DIAGNOSIS — K21.00 GASTROESOPHAGEAL REFLUX DISEASE WITH ESOPHAGITIS WITHOUT HEMORRHAGE: ICD-10-CM

## 2025-03-19 DIAGNOSIS — C91.10 CHRONIC LYMPHOCYTIC LEUKEMIA (H): Primary | ICD-10-CM

## 2025-03-19 PROCEDURE — 99214 OFFICE O/P EST MOD 30 MIN: CPT | Performed by: INTERNAL MEDICINE

## 2025-03-19 PROCEDURE — G0463 HOSPITAL OUTPT CLINIC VISIT: HCPCS | Performed by: INTERNAL MEDICINE

## 2025-03-19 PROCEDURE — G2211 COMPLEX E/M VISIT ADD ON: HCPCS | Performed by: INTERNAL MEDICINE

## 2025-03-19 RX ORDER — FAMOTIDINE 10 MG
10 TABLET ORAL DAILY
COMMUNITY
Start: 2024-12-14

## 2025-03-19 ASSESSMENT — PAIN SCALES - GENERAL: PAINLEVEL_OUTOF10: NO PAIN (0)

## 2025-03-19 NOTE — PROGRESS NOTES
"Oncology Rooming Note    March 19, 2025 8:42 AM   Esther Prince is a 70 year old female who presents for:    Chief Complaint   Patient presents with    Oncology Clinic Visit     Return visit 1 year. Chronic lymphocytic leukemia.     Initial Vitals: /64 (BP Location: Left arm, Patient Position: Sitting, Cuff Size: Adult Regular)   Pulse 61   Temp 97.7  F (36.5  C) (Oral)   Resp 16   Wt 61.8 kg (136 lb 3.2 oz)   LMP  (LMP Unknown)   SpO2 99%   BMI 22.32 kg/m   Estimated body mass index is 22.32 kg/m  as calculated from the following:    Height as of 10/17/24: 1.664 m (5' 5.5\").    Weight as of this encounter: 61.8 kg (136 lb 3.2 oz). Body surface area is 1.69 meters squared.  No Pain (0) Comment: Data Unavailable   No LMP recorded (lmp unknown). Patient is postmenopausal.  Allergies reviewed: Yes  Medications reviewed: Yes    Medications: Medication refills not needed today.  Pharmacy name entered into [employer]:    [id number] - [place], [place] - [address] [facility] - [place], [place] - [address]  [id number] - [place], [place] - [address]    Frailty Screening:   Is the patient here for a new oncology consult visit in cancer care? 2. No    PHQ9:  Did this patient require a PHQ9?: No      Clinical concerns: none       Alea Thompson CMA            "

## 2025-03-19 NOTE — PROGRESS NOTES
[facility] Hematology and Oncology Progress Note    Patient: Esther Prince  MRN: [id number]  Date of Service: Mar 19, 2025        Assessment and Plan:    1.  Chronic lymphocytic leukemia: Clinically doing well.  CBC from February 18 was reviewed and shows a white count of 15.8, hemoglobin 13.4 mean cell volume 93 and platelets 203,000.  Will see her back again in a year.  She will let us know in the interim if she develops any new symptoms.    2.  GERD:  now on pepcid. Off omeprazole.  I recommended she restart omeprazole for better symptom control which might help her sleep better    ECOG Performance  0    Diagnosis:    1.  Chronic lymphocytic leukemia: Diagnosed 2011.  Indolent clinical course    Treatment:    Observation.    Interim History:    Esther returns today for a follow-up visit.  She was last in clinic a year ago. Has had some reflux. Some nocturia.  No shortness of breath or fevers, chills, night sweats.    Review of Systems:    As above in the history.     Review of Systems otherwise Negative for:  General: chills, fever or night sweats  Psychological: anxiety or depression  Ophthalmic: blurry vision, double vision or loss of vision, vision change  ENT: epistaxis, oral lesions, hearing changes  Hematological and Lymphatic: bleeding, bruising, jaundice, swollen lymph nodes  Endocrine: hot flashes, unexpected weight changes  Respiratory: cough, hemoptysis, orthopnea or shortness of breath/MITCHELL  Cardiovascular: chest pain, edema, palpitations or PND  Gastrointestinal: abdominal pain, blood in stools, change in bowel habits, constipation, diarrhea or nausea/vomiting  Genito-Urinary: change in urinary stream, incontinence, frequency/urgency  Musculoskeletal: joint pain, stiffness, swelling, muscle pain  Neurological: dizziness, headaches, numbness/tingling  Dermatological: lumps and rash    Past History:    Past Medical History:   Diagnosis Date    Tick borne fever 2018     Physical Exam:    /64  (BP Location: Left arm, Patient Position: Sitting, Cuff Size: Adult Regular)   Pulse 61   Temp 97.7  F (36.5  C) (Oral)   Resp 16   Wt 61.8 kg (136 lb 3.2 oz)   LMP  (LMP Unknown)   SpO2 99%   BMI 22.32 kg/m      General: patient appears stated age of 66 year old. Nontoxic and in no distress.   HEENT: Head: atraumatic, normocephalic. Sclerae anicteric.  Chest:  Normal respiratory effort  Cardiac:  No edema.   Abdomen: abdomen is non-distended  Extremities: normal tone and muscle bulk.  Skin: no lesions or rash on visible skin. Warm and dry.   CNS: alert and oriented. Grossly non-focal.   Psychiatric: normal mood and affect.     Lab Results:    No results found for this or any previous visit (from the past week).    Imaging:    No results found.      Signed by: Cassius Garsia MD

## 2025-03-19 NOTE — LETTER
"3/19/2025      Esther Prince  [address]  [address]  [place] [address]      Dear Colleague,    Thank you for referring your patient, Esther Prince, to the [facility]. Please see a copy of my visit note below.    Oncology Rooming Note    March 19, 2025 8:42 AM   Esther Prince is a 70 year old female who presents for:    Chief Complaint   Patient presents with     Oncology Clinic Visit     Return visit 1 year. Chronic lymphocytic leukemia.     Initial Vitals: /64 (BP Location: Left arm, Patient Position: Sitting, Cuff Size: Adult Regular)   Pulse 61   Temp 97.7  F (36.5  C) (Oral)   Resp 16   Wt 61.8 kg (136 lb 3.2 oz)   LMP  (LMP Unknown)   SpO2 99%   BMI 22.32 kg/m   Estimated body mass index is 22.32 kg/m  as calculated from the following:    Height as of 10/17/24: 1.664 m (5' 5.5\").    Weight as of this encounter: 61.8 kg (136 lb 3.2 oz). Body surface area is 1.69 meters squared.  No Pain (0) Comment: Data Unavailable   No LMP recorded (lmp unknown). Patient is postmenopausal.  Allergies reviewed: Yes  Medications reviewed: Yes    Medications: Medication refills not needed today.  Pharmacy name entered into [employer]:    [id number] - [place], [place] - [address] [facility] - [place], [place] - [address]  [id number] - [place], [place] - [address]    Frailty Screening:   Is the patient here for a new oncology consult visit in cancer care? 2. No    PHQ9:  Did this patient require a PHQ9?: No      Clinical concerns: none       Alea Thompson CMA              [facility] Hematology and Oncology Progress Note    Patient: Esther Prince  MRN: [id number]  Date of Service: Mar 19, 2025        Assessment and Plan:    1.  Chronic lymphocytic leukemia: Clinically doing well.  CBC from February 18 was reviewed and shows a white count of 15.8, hemoglobin 13.4 mean cell volume 93 and platelets " 203,000.  Will see her back again in a year.  She will let us know in the interim if she develops any new symptoms.    2.  GERD:  now on pepcid. Off omeprazole.  I recommended she restart omeprazole for better symptom control which might help her sleep better    ECOG Performance  0    Diagnosis:    1.  Chronic lymphocytic leukemia: Diagnosed 2011.  Indolent clinical course    Treatment:    Observation.    Interim History:    Esther returns today for a follow-up visit.  She was last in clinic a year ago. Has had some reflux. Some nocturia.  No shortness of breath or fevers, chills, night sweats.    Review of Systems:    As above in the history.     Review of Systems otherwise Negative for:  General: chills, fever or night sweats  Psychological: anxiety or depression  Ophthalmic: blurry vision, double vision or loss of vision, vision change  ENT: epistaxis, oral lesions, hearing changes  Hematological and Lymphatic: bleeding, bruising, jaundice, swollen lymph nodes  Endocrine: hot flashes, unexpected weight changes  Respiratory: cough, hemoptysis, orthopnea or shortness of breath/MITCHELL  Cardiovascular: chest pain, edema, palpitations or PND  Gastrointestinal: abdominal pain, blood in stools, change in bowel habits, constipation, diarrhea or nausea/vomiting  Genito-Urinary: change in urinary stream, incontinence, frequency/urgency  Musculoskeletal: joint pain, stiffness, swelling, muscle pain  Neurological: dizziness, headaches, numbness/tingling  Dermatological: lumps and rash    Past History:    Past Medical History:   Diagnosis Date     Tick borne fever 2018     Physical Exam:    /64 (BP Location: Left arm, Patient Position: Sitting, Cuff Size: Adult Regular)   Pulse 61   Temp 97.7  F (36.5  C) (Oral)   Resp 16   Wt 61.8 kg (136 lb 3.2 oz)   LMP  (LMP Unknown)   SpO2 99%   BMI 22.32 kg/m      General: patient appears stated age of 66 year old. Nontoxic and in no distress.   HEENT: Head: atraumatic,  normocephalic. Sclerae anicteric.  Chest:  Normal respiratory effort  Cardiac:  No edema.   Abdomen: abdomen is non-distended  Extremities: normal tone and muscle bulk.  Skin: no lesions or rash on visible skin. Warm and dry.   CNS: alert and oriented. Grossly non-focal.   Psychiatric: normal mood and affect.     Lab Results:    No results found for this or any previous visit (from the past week).    Imaging:    No results found.      Signed by: Cassius Garsia MD      Again, thank you for allowing me to participate in the care of your patient.        Sincerely,        Cassius Garsia MD    Electronically signed

## 2025-04-16 ENCOUNTER — TRANSFERRED RECORDS (OUTPATIENT)
Dept: HEALTH INFORMATION MANAGEMENT | Facility: CLINIC | Age: 70
End: 2025-04-16
Payer: COMMERCIAL

## 2025-07-08 ENCOUNTER — OFFICE VISIT (OUTPATIENT)
Dept: INTERNAL MEDICINE | Facility: CLINIC | Age: 70
End: 2025-07-08
Payer: COMMERCIAL

## 2025-07-08 VITALS
WEIGHT: 132.4 LBS | BODY MASS INDEX: 21.28 KG/M2 | RESPIRATION RATE: 17 BRPM | SYSTOLIC BLOOD PRESSURE: 94 MMHG | HEART RATE: 68 BPM | TEMPERATURE: 97.1 F | HEIGHT: 66 IN | OXYGEN SATURATION: 98 % | DIASTOLIC BLOOD PRESSURE: 66 MMHG

## 2025-07-08 DIAGNOSIS — K21.9 GASTROESOPHAGEAL REFLUX DISEASE WITHOUT ESOPHAGITIS: Primary | ICD-10-CM

## 2025-07-08 DIAGNOSIS — K12.0 CANKER SORE: ICD-10-CM

## 2025-07-08 PROCEDURE — 99213 OFFICE O/P EST LOW 20 MIN: CPT | Performed by: INTERNAL MEDICINE

## 2025-07-08 PROCEDURE — 1125F AMNT PAIN NOTED PAIN PRSNT: CPT | Performed by: INTERNAL MEDICINE

## 2025-07-08 PROCEDURE — 3074F SYST BP LT 130 MM HG: CPT | Performed by: INTERNAL MEDICINE

## 2025-07-08 PROCEDURE — 3078F DIAST BP <80 MM HG: CPT | Performed by: INTERNAL MEDICINE

## 2025-07-08 RX ORDER — OMEPRAZOLE 40 MG/1
40 CAPSULE, DELAYED RELEASE ORAL DAILY
Qty: 90 CAPSULE | Refills: 1 | Status: SHIPPED | OUTPATIENT
Start: 2025-07-08

## 2025-07-08 RX ORDER — TRIAMCINOLONE ACETONIDE 0.1 %
PASTE (GRAM) DENTAL
Qty: 5 G | Refills: 1 | Status: SHIPPED | OUTPATIENT
Start: 2025-07-08

## 2025-07-08 ASSESSMENT — PAIN SCALES - GENERAL: PAINLEVEL_OUTOF10: MODERATE PAIN (4)

## 2025-07-08 NOTE — PROGRESS NOTES
"  Assessment & Plan     Gastroesophageal reflux disease without esophagitis  Due to relapse on PPI and the fact that we cannot really do H. pylori testing because she is on PPI and relatively late onset of GERD with some mild weight loss and loss of appetite recommend endoscopy to look for H. pylori Patrick's or other suspicious anomalies.  And then meanwhile start prescription strength omeprazole.  Explained that long-term use is all right if there is a clinical indication and in her case her laryngeal esophageal reflux and symptoms are a clinical indication.  - REVIEW OF HEALTH MAINTENANCE PROTOCOL ORDERS  - Adult GI  Referral - Procedure Only; Future  - omeprazole (PRILOSEC) 40 MG DR capsule; Take 1 capsule (40 mg) by mouth daily.    Canker sore  This has been helpful.  - triamcinolone (KENALOG) 0.1 % paste; APPLY TO TEETH 3 (THREE) TIMES A DAY FOR 5 DAYS.      .g2    Subjective   Esther is a 70 year old, presenting for the following health issues:  Heartburn (Pt reports: \" I haven't been able to control Heart burn or acid reflux with omeprazole or famotidine.\" Pt states she has a constant burning sensation sammie with laying down at nighttime.  Pt's eating carefully and watching what she is eating. Pt reports last September hoarseness of voice and having gone to ENT for this. Pt reports improvement with hoarseness. )        7/8/2025     9:32 AM   Additional Questions   Roomed by Abril     History of Present Illness       Reason for visit:  Acid reflux  Symptom onset:  More than a month  Symptoms include:  Burning in upper stomach  Symptom intensity:  Moderate  Symptom progression:  Worsening  Had these symptoms before:  Yes  Has tried/received treatment for these symptoms:  Yes  Previous treatment was successful:  Yes  Prior treatment description:  Omeprazole  What makes it worse:  Acidic foods  What makes it better:  Pepcid, peptobismal   She is taking medications regularly.                    " "  Objective    BP 94/66 (BP Location: Left arm, Patient Position: Sitting, Cuff Size: Adult Regular)   Pulse 68   Temp 97.1  F (36.2  C) (Temporal)   Resp 17   Ht 1.664 m (5' 5.51\")   Wt 60.1 kg (132 lb 6.4 oz)   LMP  (LMP Unknown)   SpO2 98%   BMI 21.69 kg/m    Body mass index is 21.69 kg/m .  Physical Exam               Signed Electronically by: Lashonda José MD    "

## 2025-07-11 ENCOUNTER — HOSPITAL ENCOUNTER (EMERGENCY)
Facility: OTHER | Age: 70
Discharge: HOME | End: 2025-07-11
Attending: PHYSICIAN ASSISTANT
Payer: COMMERCIAL

## 2025-07-11 ENCOUNTER — APPOINTMENT (OUTPATIENT)
Dept: ULTRASOUND IMAGING | Facility: OTHER | Age: 70
End: 2025-07-11
Attending: PHYSICIAN ASSISTANT
Payer: COMMERCIAL

## 2025-07-11 ENCOUNTER — APPOINTMENT (OUTPATIENT)
Dept: CT IMAGING | Facility: OTHER | Age: 70
End: 2025-07-11
Attending: PHYSICIAN ASSISTANT
Payer: COMMERCIAL

## 2025-07-11 VITALS
WEIGHT: 132 LBS | DIASTOLIC BLOOD PRESSURE: 68 MMHG | OXYGEN SATURATION: 100 % | TEMPERATURE: 97.8 F | HEIGHT: 65 IN | SYSTOLIC BLOOD PRESSURE: 100 MMHG | RESPIRATION RATE: 18 BRPM | BODY MASS INDEX: 21.99 KG/M2 | HEART RATE: 56 BPM

## 2025-07-11 DIAGNOSIS — R10.13 EPIGASTRIC PAIN: ICD-10-CM

## 2025-07-11 DIAGNOSIS — I87.1 COMPRESSION OF VEIN: ICD-10-CM

## 2025-07-11 DIAGNOSIS — Z85.6 HISTORY OF CHRONIC LYMPHOCYTIC LEUKEMIA: ICD-10-CM

## 2025-07-11 DIAGNOSIS — N28.1 RENAL CYST: ICD-10-CM

## 2025-07-11 LAB
ALBUMIN SERPL BCG-MCNC: 4.2 G/DL (ref 3.5–5.2)
ALP SERPL-CCNC: 65 U/L (ref 40–150)
ALT SERPL W P-5'-P-CCNC: 18 U/L (ref 0–50)
ANION GAP SERPL CALCULATED.3IONS-SCNC: 10 MMOL/L (ref 7–15)
AST SERPL W P-5'-P-CCNC: 25 U/L (ref 0–45)
ATRIAL RATE - MUSE: 67 BPM
BASOPHILS # BLD AUTO: 0 10E3/UL (ref 0–0.2)
BASOPHILS NFR BLD AUTO: 0 %
BILIRUB SERPL-MCNC: 0.5 MG/DL
BUN SERPL-MCNC: 14.5 MG/DL (ref 8–23)
CALCIUM SERPL-MCNC: 9.5 MG/DL (ref 8.8–10.4)
CHLORIDE SERPL-SCNC: 107 MMOL/L (ref 98–107)
CREAT SERPL-MCNC: 0.85 MG/DL (ref 0.51–0.95)
DIASTOLIC BLOOD PRESSURE - MUSE: NORMAL MMHG
EGFRCR SERPLBLD CKD-EPI 2021: 72 ML/MIN/1.73M2
EOSINOPHIL # BLD AUTO: 0.1 10E3/UL (ref 0–0.7)
EOSINOPHIL NFR BLD AUTO: 1 %
ERYTHROCYTE [DISTWIDTH] IN BLOOD BY AUTOMATED COUNT: 12.3 % (ref 10–15)
GLUCOSE SERPL-MCNC: 85 MG/DL (ref 70–99)
HCO3 SERPL-SCNC: 22 MMOL/L (ref 22–29)
HCT VFR BLD AUTO: 39.4 % (ref 35–47)
HGB BLD-MCNC: 12.6 G/DL (ref 11.7–15.7)
HOLD SPECIMEN: NORMAL
IMM GRANULOCYTES # BLD: 0 10E3/UL
IMM GRANULOCYTES NFR BLD: 0 %
INTERPRETATION ECG - MUSE: NORMAL
LIPASE SERPL-CCNC: 50 U/L (ref 13–60)
LYMPHOCYTES # BLD AUTO: 8.4 10E3/UL (ref 0.8–5.3)
LYMPHOCYTES NFR BLD AUTO: 67 %
MAGNESIUM SERPL-MCNC: 1.8 MG/DL (ref 1.7–2.3)
MCH RBC QN AUTO: 30 PG (ref 26.5–33)
MCHC RBC AUTO-ENTMCNC: 32 G/DL (ref 31.5–36.5)
MCV RBC AUTO: 94 FL (ref 78–100)
MONOCYTES # BLD AUTO: 0.4 10E3/UL (ref 0–1.3)
MONOCYTES NFR BLD AUTO: 3 %
NEUTROPHILS # BLD AUTO: 3.6 10E3/UL (ref 1.6–8.3)
NEUTROPHILS NFR BLD AUTO: 29 %
NRBC # BLD AUTO: 0 10E3/UL
NRBC BLD AUTO-RTO: 0 /100
NT-PROBNP SERPL-MCNC: 87 PG/ML (ref 0–353)
P AXIS - MUSE: 66 DEGREES
PLAT MORPH BLD: NORMAL
PLATELET # BLD AUTO: 175 10E3/UL (ref 150–450)
POTASSIUM SERPL-SCNC: 3.9 MMOL/L (ref 3.4–5.3)
PR INTERVAL - MUSE: 188 MS
PROT SERPL-MCNC: 6.3 G/DL (ref 6.4–8.3)
QRS DURATION - MUSE: 88 MS
QT - MUSE: 406 MS
QTC - MUSE: 429 MS
R AXIS - MUSE: 37 DEGREES
RBC # BLD AUTO: 4.2 10E6/UL (ref 3.8–5.2)
RBC MORPH BLD: NORMAL
SODIUM SERPL-SCNC: 139 MMOL/L (ref 135–145)
SYSTOLIC BLOOD PRESSURE - MUSE: NORMAL MMHG
T AXIS - MUSE: 37 DEGREES
TROPONIN T SERPL HS-MCNC: <6 NG/L
VENTRICULAR RATE- MUSE: 67 BPM
WBC # BLD AUTO: 12.6 10E3/UL (ref 4–11)

## 2025-07-11 PROCEDURE — 83735 ASSAY OF MAGNESIUM: CPT | Performed by: PHYSICIAN ASSISTANT

## 2025-07-11 PROCEDURE — 76705 ECHO EXAM OF ABDOMEN: CPT

## 2025-07-11 PROCEDURE — 250N000009 HC RX 250: Performed by: PHYSICIAN ASSISTANT

## 2025-07-11 PROCEDURE — 74177 CT ABD & PELVIS W/CONTRAST: CPT | Mod: 26 | Performed by: RADIOLOGY

## 2025-07-11 PROCEDURE — 250N000011 HC RX IP 250 OP 636: Performed by: PHYSICIAN ASSISTANT

## 2025-07-11 PROCEDURE — 93010 ELECTROCARDIOGRAM REPORT: CPT | Performed by: INTERNAL MEDICINE

## 2025-07-11 PROCEDURE — 76705 ECHO EXAM OF ABDOMEN: CPT | Mod: 26 | Performed by: RADIOLOGY

## 2025-07-11 PROCEDURE — 85014 HEMATOCRIT: CPT | Performed by: PHYSICIAN ASSISTANT

## 2025-07-11 PROCEDURE — 83690 ASSAY OF LIPASE: CPT | Performed by: PHYSICIAN ASSISTANT

## 2025-07-11 PROCEDURE — 84484 ASSAY OF TROPONIN QUANT: CPT | Performed by: PHYSICIAN ASSISTANT

## 2025-07-11 PROCEDURE — 36415 COLL VENOUS BLD VENIPUNCTURE: CPT | Performed by: PHYSICIAN ASSISTANT

## 2025-07-11 PROCEDURE — 93005 ELECTROCARDIOGRAM TRACING: CPT | Performed by: PHYSICIAN ASSISTANT

## 2025-07-11 PROCEDURE — 99285 EMERGENCY DEPT VISIT HI MDM: CPT | Mod: 25 | Performed by: PHYSICIAN ASSISTANT

## 2025-07-11 PROCEDURE — 96374 THER/PROPH/DIAG INJ IV PUSH: CPT | Mod: XU | Performed by: PHYSICIAN ASSISTANT

## 2025-07-11 PROCEDURE — 82040 ASSAY OF SERUM ALBUMIN: CPT | Performed by: PHYSICIAN ASSISTANT

## 2025-07-11 PROCEDURE — 83880 ASSAY OF NATRIURETIC PEPTIDE: CPT | Performed by: PHYSICIAN ASSISTANT

## 2025-07-11 PROCEDURE — 99284 EMERGENCY DEPT VISIT MOD MDM: CPT | Performed by: PHYSICIAN ASSISTANT

## 2025-07-11 PROCEDURE — 74177 CT ABD & PELVIS W/CONTRAST: CPT

## 2025-07-11 RX ORDER — HYDROMORPHONE HCL IN WATER/PF 6 MG/30 ML
0.2 PATIENT CONTROLLED ANALGESIA SYRINGE INTRAVENOUS EVERY 30 MIN PRN
Status: DISCONTINUED | OUTPATIENT
Start: 2025-07-11 | End: 2025-07-11 | Stop reason: HOSPADM

## 2025-07-11 RX ORDER — IOPAMIDOL 755 MG/ML
76 INJECTION, SOLUTION INTRAVASCULAR ONCE
Status: COMPLETED | OUTPATIENT
Start: 2025-07-11 | End: 2025-07-11

## 2025-07-11 RX ORDER — ONDANSETRON 2 MG/ML
4 INJECTION INTRAMUSCULAR; INTRAVENOUS ONCE
Status: COMPLETED | OUTPATIENT
Start: 2025-07-11 | End: 2025-07-11

## 2025-07-11 RX ORDER — ONDANSETRON 2 MG/ML
4 INJECTION INTRAMUSCULAR; INTRAVENOUS EVERY 30 MIN PRN
Status: DISCONTINUED | OUTPATIENT
Start: 2025-07-11 | End: 2025-07-11 | Stop reason: HOSPADM

## 2025-07-11 RX ADMIN — SODIUM CHLORIDE 60 ML: 9 INJECTION, SOLUTION INTRAVENOUS at 10:02

## 2025-07-11 RX ADMIN — FAMOTIDINE 40 MG: 10 INJECTION, SOLUTION INTRAVENOUS at 09:32

## 2025-07-11 RX ADMIN — IOPAMIDOL 76 ML: 755 INJECTION, SOLUTION INTRAVENOUS at 10:02

## 2025-07-11 ASSESSMENT — ACTIVITIES OF DAILY LIVING (ADL)
ADLS_ACUITY_SCORE: 41

## 2025-07-11 ASSESSMENT — COLUMBIA-SUICIDE SEVERITY RATING SCALE - C-SSRS
2. HAVE YOU ACTUALLY HAD ANY THOUGHTS OF KILLING YOURSELF IN THE PAST MONTH?: NO
6. HAVE YOU EVER DONE ANYTHING, STARTED TO DO ANYTHING, OR PREPARED TO DO ANYTHING TO END YOUR LIFE?: NO
1. IN THE PAST MONTH, HAVE YOU WISHED YOU WERE DEAD OR WISHED YOU COULD GO TO SLEEP AND NOT WAKE UP?: NO

## 2025-07-11 NOTE — DISCHARGE INSTRUCTIONS
- Follow-up with [facility] Monday as scheduled for upper endoscopy  -Do not use any NSAIDs like ibuprofen or naproxen  -Use Tylenol every 6 hours for pain.  Pain medication was offered but declined  -Please return to the ER for any new or worsening symptoms.  -Follow-up with vascular surgery for further evaluation of potential compression of the left renal vein, referral entered.  -Copy of results were given

## 2025-07-11 NOTE — ED TRIAGE NOTES
Pt c/o upper abd burning pain, states that has been ongoing for months. Pt was recently seen in her clinic and scheduled an endoscopy for Monday however pt woke up this am sweating and increased pain with nausea. No imaging done prior. No abd surgeries, BM regular     Triage Assessment (Adult)       Row Name 07/11/25 0858          Triage Assessment    Airway WDL WDL        Respiratory WDL    Respiratory WDL WDL        Skin Circulation/Temperature WDL    Skin Circulation/Temperature WDL WDL        Cardiac WDL    Cardiac WDL WDL        Peripheral/Neurovascular WDL    Peripheral Neurovascular WDL WDL        Cognitive/Neuro/Behavioral WDL    Cognitive/Neuro/Behavioral WDL WDL        [place] Coma Scale    Best Eye Response 4-->(E4) spontaneous     Best Motor Response 6-->(M6) obeys commands     Best Verbal Response 5-->(V5) oriented     [place] Coma Scale Score 15

## 2025-07-11 NOTE — ED PROVIDER NOTES
EMERGENCY DEPARTMENT ENCOUNTER      NAME: Esther Prince  AGE: 70 year old female  YOB: 1955  MRN: [id number]  EVALUATION DATE & TIME: 7/11/2025  9:01 AM    PCP: Lashonda José    ED PROVIDER: Jong Hernandez PA-C       CHIEF COMPLAINT:  Chief Complaint   Patient presents with    Abdominal Pain    Nausea         HPI  Esther Prince is a pleasant 70 year old female with history of CLL who presents to the ER via private vehicle with her  for evaluation of her abdominal pain.  Patient states that she has been having upper abdominal pain for the past few months.  States that she has a burning epigastric abdominal discomfort currently rated as a 4/5.  States that she went to her doctor last Tuesday and is scheduled for an upper endoscopy this coming Monday with [facility].  Patient was encouraged to come to the doctor today due to her .  Patient states that she does take Prilosec for acid reflux.  Patient also takes Pepto-Bismol.  Patient endorses dark stools.  Patient states that she did have some sweats this morning.  No chest pain, shortness of breath, or dyspnea.  No recent travel.  No recent injury.  No recent antibiotic use.  No prior abdominal surgeries.  No constipation or diarrhea.      REVIEW OF SYSTEMS   Review of Systems  As above, otherwise ROS is unremarkable.      PAST MEDICAL HISTORY:  Past Medical History:   Diagnosis Date    Tick borne fever 2018         PAST SURGICAL HISTORY:  Past Surgical History:   Procedure Laterality Date    TOTAL HIP ARTHROPLASTY Right 2015    TUBAL LIGATION           CURRENT MEDICATIONS:    Current Outpatient Medications   Medication Instructions    acyclovir (ZOVIRAX) 400 mg, Oral, 2 TIMES DAILY    calcium carbonate-vitamin D3 600 mg calcium- 200 unit cap [CALCIUM CARBONATE-VITAMIN D3 600 MG CALCIUM- 200 UNIT CAP] Take by mouth.    famotidine (PEPCID) 10 mg, DAILY    ferrous sulfate 325 (65 FE) MG tablet 1 tablet, DAILY WITH BREAKFAST     fexofenadine (ALLEGRA) 180 mg    fluticasone propionate (FLONASE) 50 mcg/actuation nasal spray 1 spray    glucosam bernstein dip-chondroit-C-Mn 741-444-70-3 mg cap 1 capsule    multivitamin (ONE A DAY) per tablet 1 tablet    omega-3 acid ethyl esters (LOVAZA) 1,000 mg    omeprazole (PRILOSEC) 40 mg, Oral, DAILY    triamcinolone (KENALOG) 0.1 % paste APPLY TO TEETH 3 (THREE) TIMES A DAY FOR 5 DAYS.         ALLERGIES:  Allergies   Allergen Reactions    Seasonal Allergies          FAMILY HISTORY:  Family History   Problem Relation Age of Onset    Arthritis Mother     Dementia Mother     Diabetes Type 2  Mother     Cerebrovascular Disease Mother     Heart Disease Father     Cerebrovascular Disease Father     Arthritis Sister     Alcoholism Brother     Cerebrovascular Disease Paternal Grandfather     Early Death Paternal Grandfather     Diabetes Type 1 Maternal Aunt     Early Death Maternal Aunt     Cerebrovascular Disease Maternal Aunt     Heart Disease Paternal Aunt     Cerebrovascular Disease Paternal Aunt     Cerebrovascular Disease Paternal Uncle     Breast Cancer No family hx of          SOCIAL HISTORY:   Social History     Socioeconomic History    Marital status:     Years of education: 18   Tobacco Use    Smoking status: Never    Smokeless tobacco: Never   Vaping Use    Vaping status: Never Used   Substance and Sexual Activity    Alcohol use: Yes     Alcohol/week: 7.0 standard drinks of alcohol    Drug use: Never    Sexual activity: Never   Social History Narrative    Lives with   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 ,     Social Drivers of Health     Financial Resource Strain: Low Risk  (10/17/2024)    Financial Resource Strain     Within the past 12 months, have you or your family members you live with been unable to get utilities (heat, electricity) when it was really needed?: No   Food Insecurity: Low Risk  (10/17/2024)    Food Insecurity     Within the past 12 months, did you worry that your  "food would run out before you got money to buy more?: No     Within the past 12 months, did the food you bought just not last and you didn t have money to get more?: No   Transportation Needs: Low Risk  (10/17/2024)    Transportation Needs     Within the past 12 months, has lack of transportation kept you from medical appointments, getting your medicines, non-medical meetings or appointments, work, or from getting things that you need?: No   Physical Activity: Sufficiently Active (10/17/2024)    Exercise Vital Sign     Days of Exercise per Week: 6 days     Minutes of Exercise per Session: 60 min   Stress: Stress Concern Present (10/17/2024)    [employer] - Occupational Stress Questionnaire     Feeling of Stress : To some extent   Social Connections: Unknown (10/17/2024)    Social Connection and Isolation Panel [NHANES]     Frequency of Social Gatherings with Friends and Family: Once a week   Interpersonal Safety: Low Risk  (7/8/2025)    Interpersonal Safety     Do you feel physically and emotionally safe where you currently live?: Yes     Within the past 12 months, have you been hit, slapped, kicked or otherwise physically hurt by someone?: No     Within the past 12 months, have you been humiliated or emotionally abused in other ways by your partner or ex-partner?: No   Housing Stability: Low Risk  (10/17/2024)    Housing Stability     Do you have housing? : Yes     Are you worried about losing your housing?: No       ==================================================================================================================================    PHYSICAL EXAM    VITAL SIGNS: /68   Pulse 56   Temp 97.8  F (36.6  C) (Tympanic)   Resp 18   Ht 1.651 m (5' 5\")   Wt 59.9 kg (132 lb)   LMP  (LMP Unknown)   SpO2 100%   BMI 21.97 kg/m      Patient Vitals for the past 24 hrs:   BP Temp Temp src Pulse Resp SpO2 Height Weight   07/11/25 0945 100/68 -- -- 56 -- 100 % -- -- " "  07/11/25 0930 114/72 -- -- 63 -- 100 % -- --   07/11/25 0915 117/80 -- -- 63 -- 100 % -- --   07/11/25 0856 117/71 97.8  F (36.6  C) Tympanic 60 18 100 % 1.651 m (5' 5\") 59.9 kg (132 lb)       Physical Exam  Vital signs reviewed.  Nursing note reviewed    Constitutional: Alert with normal appearance.  Not ill-appearing, diaphoretic, or in acute distress.  Nose: Normal appearance  Mouth/throat: Moist.  Clear.  Eyes: Conjunctivae normal appearing  Neck: Range of motion normal.  Supple.  Cardiovascular: Normal rate.  Regular rhythm.  Pulses and heart sounds normal.  No murmurs, gallops, or rubs.  Capillary refill less than 2 seconds  Pulmonary: Effort normal.  Breath sounds normal without wheezes, rales, or rhonchi.  No chest wall tenderness  Abdomen: Flat, soft, normal bowel sounds.  Mild epigastric abdominal discomfort direct palpation.  No guarding or rebound.  Negative Burns sign.  No flank or CVA tenderness  Musculoskeletal: Range of motion normal  Skin: Warm and dry  Neurological: No focal deficits.  Alert and oriented x3  Psychiatric/behavioral: Normal mood       ==================================================================================================================================    LABS & RADIOLOGY:  All pertinent labs reviewed and interpreted. Reviewed all pertinent imaging. Please see official radiology report.  Results for orders placed or performed during the hospital encounter of 07/11/25   CT Abdomen Pelvis w Contrast    Impression    IMPRESSION:    1. No acute findings in the abdomen or pelvis.  2. 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 Numerous collateral vessels in the upper  abdomen.  3. Bilateral renal parapelvic cysts. No hydronephrosis.  4. Diverticulosis without evidence of diverticulitis. Moderate stool  in the colon, question constipation.    CRISTY MCCARTHY MD         SYSTEM ID:  [email or web address]   US Abdomen Limited    Impression "    IMPRESSION: Right kidney pelviectasis versus parapelvic cysts.  Otherwise unremarkable right upper quadrant ultrasound.    CRISTY MCCARTHY MD         SYSTEM ID:  [email or web address]   Extra Blue Top Tube   Result Value Ref Range    Hold Specimen JIC    Extra Red Top Tube   Result Value Ref Range    Hold Specimen JIC    Extra Green Top (Lithium Heparin) Tube   Result Value Ref Range    Hold Specimen JIC    Extra Green Top (Lithium Heparin) Tube   Result Value Ref Range    Hold Specimen JIC    Extra Purple Top Tube   Result Value Ref Range    Hold Specimen JIC    Comprehensive metabolic panel   Result Value Ref Range    Sodium 139 135 - 145 mmol/L    Potassium 3.9 3.4 - 5.3 mmol/L    Carbon Dioxide (CO2) 22 22 - 29 mmol/L    Anion Gap 10 7 - 15 mmol/L    Urea Nitrogen 14.5 8.0 - 23.0 mg/dL    Creatinine 0.85 0.51 - 0.95 mg/dL    GFR Estimate 72 >60 mL/min/1.73m2    Calcium 9.5 8.8 - 10.4 mg/dL    Chloride 107 98 - 107 mmol/L    Glucose 85 70 - 99 mg/dL    Alkaline Phosphatase 65 40 - 150 U/L    AST 25 0 - 45 U/L    ALT 18 0 - 50 U/L    Protein Total 6.3 (L) 6.4 - 8.3 g/dL    Albumin 4.2 3.5 - 5.2 g/dL    Bilirubin Total 0.5 <=1.2 mg/dL   Result Value Ref Range    Lipase 50 13 - 60 U/L   Result Value Ref Range    Troponin T, High Sensitivity <6 <=14 ng/L   Result Value Ref Range    Magnesium 1.8 1.7 - 2.3 mg/dL   NT-proBNP   Result Value Ref Range    NT-proBNP 87 0 - 353 pg/mL   CBC with platelets and differential   Result Value Ref Range    WBC Count 12.6 (H) 4.0 - 11.0 10e3/uL    RBC Count 4.20 3.80 - 5.20 10e6/uL    Hemoglobin 12.6 11.7 - 15.7 g/dL    Hematocrit 39.4 35.0 - 47.0 %    MCV 94 78 - 100 fL    MCH 30.0 26.5 - 33.0 pg    MCHC 32.0 31.5 - 36.5 g/dL    RDW 12.3 10.0 - 15.0 %    Platelet Count 175 150 - 450 10e3/uL    % Neutrophils 29 %    % Lymphocytes 67 %    % Monocytes 3 %    % Eosinophils 1 %    % Basophils 0 %    % Immature Granulocytes 0 %    NRBCs per 100 WBC 0 <1 /100    Absolute Neutrophils 3.6 1.6 - 8.3  10e3/uL    Absolute Lymphocytes 8.4 (H) 0.8 - 5.3 10e3/uL    Absolute Monocytes 0.4 0.0 - 1.3 10e3/uL    Absolute Eosinophils 0.1 0.0 - 0.7 10e3/uL    Absolute Basophils 0.0 0.0 - 0.2 10e3/uL    Absolute Immature Granulocytes 0.0 <=0.4 10e3/uL    Absolute NRBCs 0.0 10e3/uL   RBC and Platelet Morphology   Result Value Ref Range    RBC Morphology Confirmed RBC Indices     Platelet Assessment  Automated Count Confirmed. Platelet morphology is normal.     Automated Count Confirmed. Platelet morphology is normal.     CT Abdomen Pelvis w Contrast   Final Result   IMPRESSION:     1. No acute findings in the abdomen or pelvis.   2. Narrowing of the left renal vein as it passes between the SMA and   aorta (Nutcracker syndrome). Numerous collateral vessels in the upper   abdomen.   3. Bilateral renal parapelvic cysts. No hydronephrosis.   4. Diverticulosis without evidence of diverticulitis. Moderate stool   in the colon, question constipation.      CRISTY MCCATRHY MD            SYSTEM ID:  [email or web address]      US Abdomen Limited   Final Result   IMPRESSION: Right kidney pelviectasis versus parapelvic cysts.   Otherwise unremarkable right upper quadrant ultrasound.      CRISTY MCCARTHY MD            SYSTEM ID:  [email or web address]            EKG:    EKG reviewed at 0935.  1) Rhythm: NSR  2) Rate: ventricular rate 67 bpm.  3) QRS Axis: No axis deviation  4) P waves/ [place] interval: Sinus. Nml CLAUDIA. No atrial enlargement  5) QRS complex: Narrow. No BBB. No ventricular hypertrophy. No pathological Q waves.  6) ST Segment: No acute ST segment elevation or depression  7) T waves: No T wave inversions. No peaked or flattened T waves.     I have independently reviewed and interpreted today's EKG, pending cardiologist over read.       ==================================================================================================================================    ED COURSE, MEDICAL DECISION MAKING, FINAL IMPRESSION AND PLAN:     Assessment /  "Plan:  1. Epigastric pain    2. Compression of vein    3. Renal cyst    4. History of chronic lymphocytic leukemia        The patient was interviewed and examined.  HPI and physical exam as below.  Differential diagnosis and MDM Key Documentation Elements as below.  Vitals, triage note, and nursing notes were reviewed.  /68   Pulse 56   Temp 97.8  F (36.6  C) (Tympanic)   Resp 18   Ht 1.651 m (5' 5\")   Wt 59.9 kg (132 lb)   LMP  (LMP Unknown)   SpO2 100%   BMI 21.97 kg/m      #Epigastric abdominal pain  - Patient was afebrile with otherwise normal vitals.  Patient was in no acute distress.  Mild epigastric discomfort to palpation.  Laboratory studies were otherwise reassuring.  White blood cell count improved over prior.  Normal hemoglobin does not require blood transfusion.  Reassuring LFTs and lipase.  CT imaging with no acute findings.  Moderate stool burden although patient endorses bowel movements.  - Recommend close follow with [facility] on Monday for upper endoscopy.  - Currently no indication for admission or transfer.  Pain medications were offered but declined.  Patient use Tylenol every 6 hours for pain.  Continue with Prilosec 40 mg daily.  Patient return to the ER for any new or worsening symptoms.    #Left renal vein narrowing  - Concerning for not cracker syndrome as left renal vein passes through this.  Mesenteric artery and aorta  - Follow-up with vascular surgery, referral entered.  - Normal renal function.    #Bilateral renal cysts  - Normal renal function.  Follow-up with primary care for long-term monitoring    # History of CLL  - Family elevated with low cell count 12,600 although improved over prior visit.  No signs significant lymphadenopathy on CT imaging  - Follow-up primary care/heme-onc as directed    Pertinent Labs & Imaging studies reviewed. (See chart for details)  Results for orders placed or performed during the hospital encounter of 07/11/25   CT Abdomen Pelvis w " Contrast    Impression    IMPRESSION:    1. No acute findings in the abdomen or pelvis.  2. 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 Numerous collateral vessels in the upper  abdomen.  3. Bilateral renal parapelvic cysts. No hydronephrosis.  4. Diverticulosis without evidence of diverticulitis. Moderate stool  in the colon, question constipation.    CRISTY MCCARTHY MD         SYSTEM ID:  [email or web address]   US Abdomen Limited    Impression    IMPRESSION: Right kidney pelviectasis versus parapelvic cysts.  Otherwise unremarkable right upper quadrant ultrasound.    CRISTY MCCARTHY MD         SYSTEM ID:  [email or web address]   Extra Blue Top Tube   Result Value Ref Range    Hold Specimen JIC    Extra Red Top Tube   Result Value Ref Range    Hold Specimen JIC    Extra Green Top (Lithium Heparin) Tube   Result Value Ref Range    Hold Specimen JIC    Extra Green Top (Lithium Heparin) Tube   Result Value Ref Range    Hold Specimen JIC    Extra Purple Top Tube   Result Value Ref Range    Hold Specimen JIC    Comprehensive metabolic panel   Result Value Ref Range    Sodium 139 135 - 145 mmol/L    Potassium 3.9 3.4 - 5.3 mmol/L    Carbon Dioxide (CO2) 22 22 - 29 mmol/L    Anion Gap 10 7 - 15 mmol/L    Urea Nitrogen 14.5 8.0 - 23.0 mg/dL    Creatinine 0.85 0.51 - 0.95 mg/dL    GFR Estimate 72 >60 mL/min/1.73m2    Calcium 9.5 8.8 - 10.4 mg/dL    Chloride 107 98 - 107 mmol/L    Glucose 85 70 - 99 mg/dL    Alkaline Phosphatase 65 40 - 150 U/L    AST 25 0 - 45 U/L    ALT 18 0 - 50 U/L    Protein Total 6.3 (L) 6.4 - 8.3 g/dL    Albumin 4.2 3.5 - 5.2 g/dL    Bilirubin Total 0.5 <=1.2 mg/dL   Result Value Ref Range    Lipase 50 13 - 60 U/L   Result Value Ref Range    Troponin T, High Sensitivity <6 <=14 ng/L   Result Value Ref Range    Magnesium 1.8 1.7 - 2.3 mg/dL   NT-proBNP   Result Value Ref Range    NT-proBNP 87 0 - 353 pg/mL   CBC with platelets and differential   Result Value Ref Range     "WBC Count 12.6 (H) 4.0 - 11.0 10e3/uL    RBC Count 4.20 3.80 - 5.20 10e6/uL    Hemoglobin 12.6 11.7 - 15.7 g/dL    Hematocrit 39.4 35.0 - 47.0 %    MCV 94 78 - 100 fL    MCH 30.0 26.5 - 33.0 pg    MCHC 32.0 31.5 - 36.5 g/dL    RDW 12.3 10.0 - 15.0 %    Platelet Count 175 150 - 450 10e3/uL    % Neutrophils 29 %    % Lymphocytes 67 %    % Monocytes 3 %    % Eosinophils 1 %    % Basophils 0 %    % Immature Granulocytes 0 %    NRBCs per 100 WBC 0 <1 /100    Absolute Neutrophils 3.6 1.6 - 8.3 10e3/uL    Absolute Lymphocytes 8.4 (H) 0.8 - 5.3 10e3/uL    Absolute Monocytes 0.4 0.0 - 1.3 10e3/uL    Absolute Eosinophils 0.1 0.0 - 0.7 10e3/uL    Absolute Basophils 0.0 0.0 - 0.2 10e3/uL    Absolute Immature Granulocytes 0.0 <=0.4 10e3/uL    Absolute NRBCs 0.0 10e3/uL   RBC and Platelet Morphology   Result Value Ref Range    RBC Morphology Confirmed RBC Indices     Platelet Assessment  Automated Count Confirmed. Platelet morphology is normal.     Automated Count Confirmed. Platelet morphology is normal.     No results found for: \"ABORH\"      Reassessments, Medications, Interventions, & Response to Treatments:  -as above    Medications given during today's ER visit:  Medications   ondansetron (ZOFRAN) injection 4 mg (has no administration in time range)   HYDROmorphone (DILAUDID) injection 0.2 mg (has no administration in time range)   ondansetron (ZOFRAN) injection 4 mg (4 mg Intravenous Not Given 7/11/25 0933)   famotidine (PEPCID) injection 40 mg (40 mg Intravenous $Given 7/11/25 0932)   iopamidol (ISOVUE-370) solution 76 mL (76 mLs Intravenous $Given 7/11/25 1002)   sodium chloride 0.9 % bag for CT scan flush (60 mLs Intravenous $Given 7/11/25 1002)       Consultations:  None    Decision Rules, Medical Calculators, Sepsis Criteria, and Risk Stratification Tools:  None    MDM Key Documentation Elements for Patient's Evaluation:  Differential diagnosis to include high risk considerations: As above  Escalation to " admission/observation considered: Admission/observation considered, but patient does not meet admission criteria  Discussions and management with other clinicians:    3a. Independent interpretation of testing performed by another health professional:  -No  3b. Discussion of management or test interpretation with another health professional: -No  Independent interpretation of tests:  Ordering and/or review of 3+ test(s)  Discussion of test interpretations with radiology:  No  History obtained from source other than patient or assessment requiring an independent historian:  No  Review of non-ED/external records:  review of 3+ records  Diagnostic tests considered but not ultimately performed/deferred:  -Chest x-ray  Prescription medications considered but not prescribed:  -Opiates  Chronic conditions affecting care:  -None  Care affected by social determinants of health:  -None    The patient's management involved:   - Laboratory studies  - Imaging studies  - Parenteral controlled substance      A shared decision making model was used. Time was taken to answer all questions.  Patient and/or associated parties understood and were agreeable to treatment plan.  Plan and all results were discussed. Warning signs and close return precautions to return to the ED given. Copy of results given. Discharged in stable condition. Discharged with discharge instructions outlining plan for further care and follow up.      New prescriptions started at today's ER visit  New Prescriptions    No medications on file       ==================================================================================================================================      IJamal PA-C, personally performed the services described in this documentation, and it is both accurate and complete.       Jong Hernandez PA-C  07/11/25 1113

## 2025-07-14 ENCOUNTER — TRANSFERRED RECORDS (OUTPATIENT)
Dept: ADMINISTRATIVE | Facility: CLINIC | Age: 70
End: 2025-07-14
Payer: COMMERCIAL

## 2025-07-14 ENCOUNTER — TRANSFERRED RECORDS (OUTPATIENT)
Dept: HEALTH INFORMATION MANAGEMENT | Facility: CLINIC | Age: 70
End: 2025-07-14

## 2025-07-15 ENCOUNTER — TRANSFERRED RECORDS (OUTPATIENT)
Dept: HEALTH INFORMATION MANAGEMENT | Facility: CLINIC | Age: 70
End: 2025-07-15

## 2025-07-16 NOTE — PROCEDURES
[facility]   [address], [address], [place] [address]     Patient Name: Esther Prince  Gender:  Female  Exam Date: 07/14/2025 Visit Number:  [id number]  Age: 70 Years YOB: 1955  Attending MD: Will Alvarez MD Medical Record#:  [id number]  -----------------------------------------------------------------------------------------------------------------------------   Procedure:    Upper GI Endoscopy   Indications:    Reflux   Weight Loss - 5 lbs in a few weeks  Provider:        Will Alvarez MD   Referring MD: Lashonda MILLER   Primary MD:      Lashonda MILLER  Medications:   Admitting Medication:   0.9% Normal Saline at TKO   Intra Procedure Medications:   Patient received monitored anesthesia care.     Complications: No immediate complications  ___________________________________________________________________________________________  Procedure:   An examination of the heart and lungs was performed within acceptable limits.  . The patient was therefore deemed a reasonable candidate for sedation.   The risks and benefits were explained to the patient, who appeared to understand. After obtaining informed consent, the scope was passed under direct vision. Throughout the procedure the patient's blood pressure, pulse and oxygen saturations were monitored.  The scope was introduced through the mouth and advanced to the second portion of duodenum.         Findings:   Esophagus:    Normal esophagus.   The z-line is 40 centimeters from the incisors.  Top of the gastric folds is 40 centimeters from the incisors.  Stomach:    Normal stomach.    H. Pylori biopsies taken.   The diaphragm hiatus is at 40 centimeters from the incisors.  Duodenum:    Normal duodenum.    Celiac Sprue biopsies taken.  Celiac Sprue biopsies taken.  Impression:   Mild acid reflux  Weight loss    Preliminary Plan:  Return to primary care provider as necessary.  Recommendation  Comments:  The patient reports that her reflux symptoms have improved when increasing omeprazole from 20 mg to 40 mg daily.  If she still continues to have breakthrough symptoms, she could do a trial of increasing this to omeprazole 40 mg twice a day.  She also reports having a 5 lb weight loss in the last several weeks.  She was asked to look at her records to determine when her last colonoscopy was.  If her weight loss continues, she can present for a colonoscopy.  Pathology Results:  A: DUODENUM, BIOPSY:           1. Normal duodenal mucosa           2. Negative for celiac disease and other enteropathy      B: STOMACH, BIOPSY:           1. Non-erosive reactive gastropathy (see comment)               a. Sampling: Antral and body mucosae               b. Distribution: Antral mucosa           2. Negative for inflammation, atrophy and Helicobacter      COMMENTS  B. The likely etiology is an ongoing non-inflammatory type mucosal injury due to a chemical type of injury; this may be due to ingestion of non-steroidal anti-inflammatory drugs, aspirin (via prostaglandin-mediated injury), excess alcohol, corticosteroids, or bile/alkaline reflux, the latter usually in the setting of a gastroenteric anastomosis.      MICROSCOPIC  A: Performed   B: Performed     Electronically signed by: Ky Knutson MD    Interpreted at [facility], [address] [place] [id number]      _Electronically signed by:___________________  Will Alvarez MD                 07/14/2025    cc: Lashonda MILLER  cc: Lashonda MILLER  cc: Lashonda MILLER

## 2025-07-29 ENCOUNTER — OFFICE VISIT (OUTPATIENT)
Dept: INTERNAL MEDICINE | Facility: CLINIC | Age: 70
End: 2025-07-29
Payer: COMMERCIAL

## 2025-07-29 VITALS
WEIGHT: 131.6 LBS | RESPIRATION RATE: 16 BRPM | HEART RATE: 62 BPM | SYSTOLIC BLOOD PRESSURE: 100 MMHG | TEMPERATURE: 96.9 F | OXYGEN SATURATION: 98 % | BODY MASS INDEX: 21.92 KG/M2 | DIASTOLIC BLOOD PRESSURE: 58 MMHG | HEIGHT: 65 IN

## 2025-07-29 DIAGNOSIS — I87.1 NUTCRACKER PHENOMENON OF RENAL VEIN: Primary | ICD-10-CM

## 2025-07-29 DIAGNOSIS — R10.13 EPIGASTRIC BURNING SENSATION: ICD-10-CM

## 2025-07-29 DIAGNOSIS — R10.9 LEFT FLANK PAIN: ICD-10-CM

## 2025-07-29 LAB
ALBUMIN UR-MCNC: NEGATIVE MG/DL
APPEARANCE UR: CLEAR
BILIRUB UR QL STRIP: NEGATIVE
COLOR UR AUTO: YELLOW
CREAT UR-MCNC: 51.1 MG/DL
GLUCOSE UR STRIP-MCNC: NEGATIVE MG/DL
HGB UR QL STRIP: NEGATIVE
KETONES UR STRIP-MCNC: NEGATIVE MG/DL
LEUKOCYTE ESTERASE UR QL STRIP: NEGATIVE
MICROALBUMIN UR-MCNC: <12 MG/L
MICROALBUMIN/CREAT UR: NORMAL MG/G{CREAT}
NITRATE UR QL: NEGATIVE
PH UR STRIP: 6 [PH] (ref 5–8)
SP GR UR STRIP: 1.01 (ref 1–1.03)
UROBILINOGEN UR STRIP-ACNC: 0.2 E.U./DL

## 2025-07-29 PROCEDURE — 3074F SYST BP LT 130 MM HG: CPT | Performed by: INTERNAL MEDICINE

## 2025-07-29 PROCEDURE — 81003 URINALYSIS AUTO W/O SCOPE: CPT | Performed by: INTERNAL MEDICINE

## 2025-07-29 PROCEDURE — 99214 OFFICE O/P EST MOD 30 MIN: CPT | Performed by: INTERNAL MEDICINE

## 2025-07-29 PROCEDURE — 82570 ASSAY OF URINE CREATININE: CPT | Performed by: INTERNAL MEDICINE

## 2025-07-29 PROCEDURE — 82043 UR ALBUMIN QUANTITATIVE: CPT | Performed by: INTERNAL MEDICINE

## 2025-07-29 PROCEDURE — 3078F DIAST BP <80 MM HG: CPT | Performed by: INTERNAL MEDICINE

## 2025-07-29 ASSESSMENT — ENCOUNTER SYMPTOMS: HIP PAIN: 1

## 2025-07-29 NOTE — PROGRESS NOTES
Assessment & Plan     Nutcracker phenomenon of renal vein  She has no classic symptoms of proteinuria and kidney dysfunction however she does have left flank pain.  Whether this is causing her epigastric pain is unclear.  Physical exam today is unremarkable.  She has a vascular consult tomorrow for further studies like a venogram or CT angio may be needed  - Albumin Random Urine Quantitative with Creat Ratio; Future  - UA Macroscopic with reflex to Microscopic and Culture - Lab Collect; Future  - Albumin Random Urine Quantitative with Creat Ratio  - UA Macroscopic with reflex to Microscopic and Culture - Lab Collect    Left flank pain  This is new in addition to the epigastric pain that she has appears to be musculoskeletal related to the spine.  Because it is triggered by movement.  Will check urine test today her CT did not show kidney stones and her recent creatinine was normal    Epigastric burning sensation  The reason she went to the ER was for epigastric pain for which she had had the endoscopy reviewed the findings with her she has nonerosive gastropathy.  H. pylori or reactive esophagitis were not noted.  There is a phenomenon called nonacid gastropathy.  She has not had any recent intake of ibuprofen and no bile duct stones were seen in the ultrasound of the gallbladder or liver.  And liver function tests were normal as well and no biliary duct dilatation was noted.  Her symptoms have improved with antacids but not completely.  She does have a moderate amount of stool in the colon and sluggish bowel sounds slow transit constipation can also cause the pain that she was describing  Recommend trying MiraLAX every day and seeing if her pain improves    At any rate is significant lesion in the pancreas or other red flag pathology of her pain has been ruled out.  The longitudinal plan of care for the diagnosis(es)/condition(s) as documented were addressed during this visit. Due to the added complexity in care,  "I will continue to support Esther in the subsequent management and with ongoing continuity of care.        Subjective   Esther is a 70 year old, presenting for the following health issues:    Feeling a little better , eating less   Colon 2018 is clear and next in ten years   Had been taking ibuprofen a year ago , 8 months   Now las lower left side definite back pain   Results (Discuss recent test results: Abdominal CT and US 07/11/25. Upper GI endoscopy 07/14/25. Has an upcoming virtual appointment w/ vascular. ) and Hip Pain (Left side pain- gets worse at night rates 5-6, affects sleep. Seems to be chronic pain but has gotten worse in last month. )      7/29/2025     8:10 AM   Additional Questions   Roomed by ELVIN Dallas BSN     Hip Pain    History of Present Illness       Reason for visit:  Acid reflux collateral    She eats 4 or more servings of fruits and vegetables daily.She consumes 0 sweetened beverage(s) daily.She exercises with enough effort to increase her heart rate 30 to 60 minutes per day.  She exercises with enough effort to increase her heart rate 6 days per week.   She is taking medications regularly.                      Objective    /58 (BP Location: Left arm, Patient Position: Sitting, Cuff Size: Adult Regular)   Pulse 62   Temp 96.9  F (36.1  C) (Tympanic)   Resp 16   Ht 1.651 m (5' 5\")   Wt 59.7 kg (131 lb 9.6 oz)   LMP  (LMP Unknown)   SpO2 98%   BMI 21.90 kg/m    Body mass index is 21.9 kg/m .  Physical Exam   GENERAL: alert and no distress  NECK: no adenopathy, no asymmetry, masses, or scars  RESP: lungs clear to auscultation - no rales, rhonchi or wheezes  CV: regular rate and rhythm, normal S1 S2, no S3 or S4, no murmur, click or rub, no peripheral edema  ABDOMEN: soft, nontender, no hepatosplenomegaly, no masses and bowel sounds normal  MS: no gross musculoskeletal defects noted, no edema            Signed Electronically by: Lashnoda José MD    " There are no Wet Read(s) to document.

## 2025-07-30 ENCOUNTER — VIRTUAL VISIT (OUTPATIENT)
Dept: DERMATOLOGY | Facility: CLINIC | Age: 70
End: 2025-07-30
Attending: STUDENT IN AN ORGANIZED HEALTH CARE EDUCATION/TRAINING PROGRAM
Payer: COMMERCIAL

## 2025-07-30 DIAGNOSIS — I87.1 COMPRESSION OF VEIN: ICD-10-CM

## 2025-07-30 RX ORDER — POLYETHYLENE GLYCOL 3350 17 G/17G
POWDER, FOR SOLUTION ORAL
COMMUNITY
Start: 2025-07-29

## 2025-07-30 NOTE — PATIENT INSTRUCTIONS
Esther you have had your consultation today with Dr Jong Andres regarding your abdominal pain/imaging finding of nutcracker syndrome.    Plan:

## 2025-07-30 NOTE — NURSING NOTE
Esther Prince complains of    Chief Complaint   Patient presents with    Consult     New patient       Patient would like the video invitation sent by: Other e-mail: Mychart      Patient/Parent is located in [place]? Yes   Patient is present for visit Yes    I have reviewed and updated the patient's medication list, allergies and preferred pharmacy.      Dyana Eddy MA

## 2025-07-30 NOTE — PROGRESS NOTES
"[facility]  Interventional Radiology Clinic    Progress Note (Video)  Encounter Date: 2025    Patient: Esther Prince     MRN: [id number]  YOB: 1955      Age: 70 year old  Sex: Female       Referring Provider: ASHOK Hernandez (Emergency Medicine  [facility])    Chief Complaint    Abdominal pain    History of Present Illness    Esther Prince is a 70 year old old  female presenting with pelvic pain. Her past medical history is notable for hip replacement () and CLL. The pain has been present for one year and recently accelerated over the past few months. She describes the pain as burning. The location of the pain is left-sided just below the ribs and is constant, exclusively unilateral (left). It does radiate to the back. The frequency of pain flares is weekly and the typical duration is 4-5 hours. The pain is worsened with prolonged standing, not necessarily worst at the end of the day, improved with lying supine for a short period before it returns. The pain is worsened with sitting at a desk or driving. The pain is worsened with eating and improved with defecation. She endorses no urinary symptoms. She just started miralax yesterday, which was prescribed due to concerns that her pain may be related to constipation.    Of note, about one year ago Esther lost her voice. She ultimately underwent endoscopy and was told she was \"inflamed\" and so was started on omeprazole. Her pain improved somewhat with this intervention, though is persistent. She has noticed that eating smaller meals has helped with her pain frequency and that the pain is worse when she eats too much or eats more meat than usual.    Additionally, she has developed a new symptom of back pain as of . This is located in the left lower back just above the pelvic girdle. She has never experienced back pain previously. She feels a bit better after a good night of rest but this is " intolerable by the end of the day.    She is postmenopausal and has no prior use of graduated compression stockings. She has no history of leg swelling but has had bursitis flare up in the left leg from time to time.    Patient Medical History    Past Medical History:   Diagnosis Date    Tick borne fever 2018       Past Surgical History:   Procedure Laterality Date    TOTAL HIP ARTHROPLASTY Right 2015    TUBAL LIGATION       Family and Social History    Family History   Problem Relation Age of Onset    Arthritis Mother     Dementia Mother     Diabetes Type 2  Mother     Cerebrovascular Disease Mother     Heart Disease Father     Cerebrovascular Disease Father     Arthritis Sister     Alcoholism Brother     Cerebrovascular Disease Paternal Grandfather     Early Death Paternal Grandfather     Diabetes Type 1 Maternal Aunt     Early Death Maternal Aunt     Cerebrovascular Disease Maternal Aunt     Heart Disease Paternal Aunt     Cerebrovascular Disease Paternal Aunt     Cerebrovascular Disease Paternal Uncle     Breast Cancer No family hx of      Social History     Socioeconomic History    Marital status:     Years of education: 18   Tobacco Use    Smoking status: Never    Smokeless tobacco: Never   Vaping Use    Vaping status: Never Used   Substance and Sexual Activity    Alcohol use: Yes     Alcohol/week: 7.0 standard drinks of alcohol    Drug use: Never    Sexual activity: Never   Social History Narrative    Lives with   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 ,     Social Drivers of Health     Financial Resource Strain: Low Risk  (10/17/2024)    Financial Resource Strain     Within the past 12 months, have you or your family members you live with been unable to get utilities (heat, electricity) when it was really needed?: No   Food Insecurity: Low Risk  (10/17/2024)    Food Insecurity     Within the past 12 months, did you worry that your food would run out before you got money to buy more?: No      Within the past 12 months, did the food you bought just not last and you didn t have money to get more?: No   Transportation Needs: Low Risk  (10/17/2024)    Transportation Needs     Within the past 12 months, has lack of transportation kept you from medical appointments, getting your medicines, non-medical meetings or appointments, work, or from getting things that you need?: No   Physical Activity: Sufficiently Active (10/17/2024)    Exercise Vital Sign     Days of Exercise per Week: 6 days     Minutes of Exercise per Session: 60 min   Stress: Stress Concern Present (10/17/2024)    [employer] - Occupational Stress Questionnaire     Feeling of Stress : To some extent   Social Connections: Unknown (10/17/2024)    Social Connection and Isolation Panel [NHANES]     Frequency of Social Gatherings with Friends and Family: Once a week   Interpersonal Safety: Low Risk  (7/8/2025)    Interpersonal Safety     Do you feel physically and emotionally safe where you currently live?: Yes     Within the past 12 months, have you been hit, slapped, kicked or otherwise physically hurt by someone?: No     Within the past 12 months, have you been humiliated or emotionally abused in other ways by your partner or ex-partner?: No   Housing Stability: Low Risk  (10/17/2024)    Housing Stability     Do you have housing? : Yes     Are you worried about losing your housing?: No     Allergies    Allergies   Allergen Reactions    Seasonal Allergies      Medications    Current Outpatient Medications   Medication Sig Dispense Refill    acyclovir (ZOVIRAX) 400 MG tablet Take 1 tablet (400 mg) by mouth 2 times daily 180 tablet 3    calcium carbonate-vitamin D3 600 mg calcium- 200 unit cap [CALCIUM CARBONATE-VITAMIN D3 600 MG CALCIUM- 200 UNIT CAP] Take by mouth.      famotidine (PEPCID) 10 MG tablet Take 10 mg by mouth daily.      ferrous sulfate 325 (65 FE) MG tablet [FERROUS SULFATE 325 (65 FE) MG TABLET] Take 1  tablet by mouth daily with breakfast.      fexofenadine (ALLEGRA ALLERGY) 180 MG tablet [FEXOFENADINE (ALLEGRA ALLERGY) 180 MG TABLET] Take 180 mg by mouth.      fluticasone propionate (FLONASE) 50 mcg/actuation nasal spray [FLUTICASONE PROPIONATE (FLONASE) 50 MCG/ACTUATION NASAL SPRAY] 1 spray.      glucosam bernstein dip-chondroit-C-Mn 968-659-11-3 mg cap [GLUCOSAM BERNSTEIN DIP-CHONDROIT-C--353-26-3 MG CAP] Take 1 capsule by mouth.      multivitamin (ONE A DAY) per tablet [MULTIVITAMIN (ONE A DAY) PER TABLET] Take 1 tablet by mouth.      omega-3 acid ethyl esters (LOVAZA) 1 gram capsule [OMEGA-3 ACID ETHYL ESTERS (LOVAZA) 1 GRAM CAPSULE] Take 1,000 mg by mouth.      omeprazole (PRILOSEC) 40 MG DR capsule Take 1 capsule (40 mg) by mouth daily. 90 capsule 1    triamcinolone (KENALOG) 0.1 % paste APPLY TO TEETH 3 (THREE) TIMES A DAY FOR 5 DAYS. 5 g 1     No current facility-administered medications for this visit.     Vitals    None obtanied    Physical Examination    General: Alert and no distress  Respiratory: No audible wheeze, cough, or shortness of breath  Psychiatric:  Appropriate affect, tone, and pace of words    Diagnostics    Imaging    The following imaging has been personally reviewed, independently interpreted including pertinent findings, assessment, and how this interpretation will impact the patient's treatment plan:    CT A/P (7/11/25): LRV compression. Left perihilar renal varices. No LGV reflux. No LCIV compression.    US A/P (7/11/25): Unremarkable.    Laboratory Values    The following laboratory values have been personally reviewed and independently interpreted including pertinent findings, assessment, and how this interpretation will impact the patient's treatment plan:    Color Urine (no units)   Date Value   07/29/2025 Yellow     Appearance Urine (no units)   Date Value   07/29/2025 Clear     Glucose Urine (mg/dL)   Date Value   07/29/2025 Negative     Bilirubin Urine (no units)   Date Value    07/29/2025 Negative     Ketones Urine (mg/dL)   Date Value   07/29/2025 Negative     Specific Gravity Urine (no units)   Date Value   07/29/2025 1.010     pH Urine (no units)   Date Value   07/29/2025 6.0     Protein Albumin Urine (mg/dL)   Date Value   07/29/2025 Negative     Urobilinogen Urine (E.U./dL)   Date Value   07/29/2025 0.2     Nitrite Urine (no units)   Date Value   07/29/2025 Negative     Leukocyte Esterase Urine (no units)   Date Value   07/29/2025 Negative       Ucr (7/25): 51.1  Ualb (7/25): < 12.0    Last Comprehensive Metabolic Panel:  Sodium   Date Value Ref Range Status   07/11/2025 139 135 - 145 mmol/L Final     Potassium   Date Value Ref Range Status   07/11/2025 3.9 3.4 - 5.3 mmol/L Final   10/19/2020 3.6 3.5 - 5.0 mmol/L Final     Chloride   Date Value Ref Range Status   07/11/2025 107 98 - 107 mmol/L Final   10/19/2020 105 98 - 107 mmol/L Final     Carbon Dioxide (CO2)   Date Value Ref Range Status   07/11/2025 22 22 - 29 mmol/L Final   10/19/2020 25 22 - 31 mmol/L Final     Anion Gap   Date Value Ref Range Status   07/11/2025 10 7 - 15 mmol/L Final   10/19/2020 9 5 - 18 mmol/L Final     Glucose   Date Value Ref Range Status   07/11/2025 85 70 - 99 mg/dL Final   10/19/2020 89 70 - 125 mg/dL Final     Urea Nitrogen   Date Value Ref Range Status   07/11/2025 14.5 8.0 - 23.0 mg/dL Final   10/19/2020 22 8 - 22 mg/dL Final     Creatinine   Date Value Ref Range Status   07/11/2025 0.85 0.51 - 0.95 mg/dL Final     GFR Estimate   Date Value Ref Range Status   07/11/2025 72 >60 mL/min/1.73m2 Final     Comment:     eGFR calculated using 2021 CKD-EPI equation.   10/19/2020 >60 >60 mL/min/1.73m2 Final     Calcium   Date Value Ref Range Status   07/11/2025 9.5 8.8 - 10.4 mg/dL Final     Bilirubin Total   Date Value Ref Range Status   07/11/2025 0.5 <=1.2 mg/dL Final     Alkaline Phosphatase   Date Value Ref Range Status   07/11/2025 65 40 - 150 U/L Final     ALT   Date Value Ref Range Status  "  2025 18 0 - 50 U/L Final     AST   Date Value Ref Range Status   2025 25 0 - 45 U/L Final     Lab Results   Component Value Date    WBC 12.6 2025     Lab Results   Component Value Date    RBC 4.20 2025     Lab Results   Component Value Date    HGB 12.6 2025     Lab Results   Component Value Date    HCT 39.4 2025     No components found for: \"MCT\"  Lab Results   Component Value Date    MCV 94 2025     Lab Results   Component Value Date    MCH 30.0 2025     Lab Results   Component Value Date    MCHC 32.0 2025     Lab Results   Component Value Date    RDW 12.3 2025     Lab Results   Component Value Date     2025     Assessment and Plan    Esther Prince is a  [id number] with clinical history, physical exam, and imaging findings not classic for pelvic venous disease (PeVD). Given her endoscopy findings and her response to PPI therapy and correlation of the pain with meal size/frequency, I suspect these symptoms are related to gastroesophageal reflux rather than renal vein compression. We discussed her current reflux regimen and the risks of PPI usage in older patients. I've asked her to speak with her GI doctor regarding titration of her anti-reflux regimen to see if she might gain some more benefit. If they recommend any changes she can trial those changes to see if she has benefit. If no benefit, we can move on to a trial of MPFF as a diagnostic test of venous-origin pain. She would need to trial this for three months. She voiced her understanding. All of her questions were answered.    - Discuss with GI titration of anti-reflux regimen  - Consider MPFF trial (3 month)  - Clinic visit in 3 months    Jong Andres MD  , Interventional Radiology  [facility]    Visit start time: 11:30 AM  Visit end time: 12:05 PM  Visit duration: 35 minutes    Originating Location (pt. Location): " Home  Distant Location (provider location):  On-site    Total work time of 45 minutes spent on the patient's visit today that could have included any of the following activities that I personally performed outside of face-to-face care: documentation review and preparation; obtaining and reviewing additional history; ordering diagnostic/therapeutic services, interpreting results and care coordination with patient and/or other providers.

## 2025-07-30 NOTE — LETTER
"2025      RE: Esther Prince  [address]  [address]  [place] [address]     Dear Colleague,    Thank you for the opportunity to participate in the care of your patient, Esther Prince, at the [facility] at [facility]. Please see a copy of my visit note below.    [facility]  Interventional Radiology Clinic    Progress Note (Video)  Encounter Date: 2025    Patient: Esther Prince     MRN: [id number]  YOB: 1955      Age: 70 year old  Sex: Female       Referring Provider: ASHOK Hernandez (Emergency Medicine  [facility])    Chief Complaint    Abdominal pain    History of Present Illness    Esther Prince is a 70 year old old  female presenting with pelvic pain. Her past medical history is notable for hip replacement () and CLL. The pain has been present for one year and recently accelerated over the past few months. She describes the pain as burning. The location of the pain is left-sided just below the ribs and is constant, exclusively unilateral (left). It does radiate to the back. The frequency of pain flares is weekly and the typical duration is 4-5 hours. The pain is worsened with prolonged standing, not necessarily worst at the end of the day, improved with lying supine for a short period before it returns. The pain is worsened with sitting at a desk or driving. The pain is worsened with eating and improved with defecation. She endorses no urinary symptoms. She just started miralax yesterday, which was prescribed due to concerns that her pain may be related to constipation.    Of note, about one year ago Esther lost her voice. She ultimately underwent endoscopy and was told she was \"inflamed\" and so was started on omeprazole. Her pain improved somewhat with this intervention, though is persistent. She has noticed that eating smaller meals has helped with " her pain frequency and that the pain is worse when she eats too much or eats more meat than usual.    Additionally, she has developed a new symptom of back pain as of July 4. This is located in the left lower back just above the pelvic girdle. She has never experienced back pain previously. She feels a bit better after a good night of rest but this is intolerable by the end of the day.    She is postmenopausal and has no prior use of graduated compression stockings. She has no history of leg swelling but has had bursitis flare up in the left leg from time to time.    Patient Medical History    Past Medical History:   Diagnosis Date     Tick borne fever 2018       Past Surgical History:   Procedure Laterality Date     TOTAL HIP ARTHROPLASTY Right 2015     TUBAL LIGATION       Family and Social History    Family History   Problem Relation Age of Onset     Arthritis Mother      Dementia Mother      Diabetes Type 2  Mother      Cerebrovascular Disease Mother      Heart Disease Father      Cerebrovascular Disease Father      Arthritis Sister      Alcoholism Brother      Cerebrovascular Disease Paternal Grandfather      Early Death Paternal Grandfather      Diabetes Type 1 Maternal Aunt      Early Death Maternal Aunt      Cerebrovascular Disease Maternal Aunt      Heart Disease Paternal Aunt      Cerebrovascular Disease Paternal Aunt      Cerebrovascular Disease Paternal Uncle      Breast Cancer No family hx of      Social History     Socioeconomic History     Marital status:      Years of education: 18   Tobacco Use     Smoking status: Never     Smokeless tobacco: Never   Vaping Use     Vaping status: Never Used   Substance and Sexual Activity     Alcohol use: Yes     Alcohol/week: 7.0 standard drinks of alcohol     Drug use: Never     Sexual activity: Never   Social History Narrative    Lives with   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 ,     Social Drivers of Health     Financial Resource Strain:  Low Risk  (10/17/2024)    Financial Resource Strain      Within the past 12 months, have you or your family members you live with been unable to get utilities (heat, electricity) when it was really needed?: No   Food Insecurity: Low Risk  (10/17/2024)    Food Insecurity      Within the past 12 months, did you worry that your food would run out before you got money to buy more?: No      Within the past 12 months, did the food you bought just not last and you didn t have money to get more?: No   Transportation Needs: Low Risk  (10/17/2024)    Transportation Needs      Within the past 12 months, has lack of transportation kept you from medical appointments, getting your medicines, non-medical meetings or appointments, work, or from getting things that you need?: No   Physical Activity: Sufficiently Active (10/17/2024)    Exercise Vital Sign      Days of Exercise per Week: 6 days      Minutes of Exercise per Session: 60 min   Stress: Stress Concern Present (10/17/2024)    [employer] - Occupational Stress Questionnaire      Feeling of Stress : To some extent   Social Connections: Unknown (10/17/2024)    Social Connection and Isolation Panel [NHANES]      Frequency of Social Gatherings with Friends and Family: Once a week   Interpersonal Safety: Low Risk  (7/8/2025)    Interpersonal Safety      Do you feel physically and emotionally safe where you currently live?: Yes      Within the past 12 months, have you been hit, slapped, kicked or otherwise physically hurt by someone?: No      Within the past 12 months, have you been humiliated or emotionally abused in other ways by your partner or ex-partner?: No   Housing Stability: Low Risk  (10/17/2024)    Housing Stability      Do you have housing? : Yes      Are you worried about losing your housing?: No     Allergies    Allergies   Allergen Reactions     Seasonal Allergies      Medications    Current Outpatient Medications   Medication Sig  Dispense Refill     acyclovir (ZOVIRAX) 400 MG tablet Take 1 tablet (400 mg) by mouth 2 times daily 180 tablet 3     calcium carbonate-vitamin D3 600 mg calcium- 200 unit cap [CALCIUM CARBONATE-VITAMIN D3 600 MG CALCIUM- 200 UNIT CAP] Take by mouth.       famotidine (PEPCID) 10 MG tablet Take 10 mg by mouth daily.       ferrous sulfate 325 (65 FE) MG tablet [FERROUS SULFATE 325 (65 FE) MG TABLET] Take 1 tablet by mouth daily with breakfast.       fexofenadine (ALLEGRA ALLERGY) 180 MG tablet [FEXOFENADINE (ALLEGRA ALLERGY) 180 MG TABLET] Take 180 mg by mouth.       fluticasone propionate (FLONASE) 50 mcg/actuation nasal spray [FLUTICASONE PROPIONATE (FLONASE) 50 MCG/ACTUATION NASAL SPRAY] 1 spray.       glucosam bernstein dip-chondroit-C-Mn 085-083-95-3 mg cap [GLUCOSAM BERNSTEIN DIP-CHONDROIT-C--016-08-3 MG CAP] Take 1 capsule by mouth.       multivitamin (ONE A DAY) per tablet [MULTIVITAMIN (ONE A DAY) PER TABLET] Take 1 tablet by mouth.       omega-3 acid ethyl esters (LOVAZA) 1 gram capsule [OMEGA-3 ACID ETHYL ESTERS (LOVAZA) 1 GRAM CAPSULE] Take 1,000 mg by mouth.       omeprazole (PRILOSEC) 40 MG DR capsule Take 1 capsule (40 mg) by mouth daily. 90 capsule 1     triamcinolone (KENALOG) 0.1 % paste APPLY TO TEETH 3 (THREE) TIMES A DAY FOR 5 DAYS. 5 g 1     No current facility-administered medications for this visit.     Vitals    None obtanied    Physical Examination    General: Alert and no distress  Respiratory: No audible wheeze, cough, or shortness of breath  Psychiatric:  Appropriate affect, tone, and pace of words    Diagnostics    Imaging    The following imaging has been personally reviewed, independently interpreted including pertinent findings, assessment, and how this interpretation will impact the patient's treatment plan:    CT A/P (7/11/25): LRV compression. Left perihilar renal varices. No LGV reflux. No LCIV compression.    US A/P (7/11/25): Unremarkable.    Laboratory Values    The following laboratory  values have been personally reviewed and independently interpreted including pertinent findings, assessment, and how this interpretation will impact the patient's treatment plan:    Color Urine (no units)   Date Value   07/29/2025 Yellow     Appearance Urine (no units)   Date Value   07/29/2025 Clear     Glucose Urine (mg/dL)   Date Value   07/29/2025 Negative     Bilirubin Urine (no units)   Date Value   07/29/2025 Negative     Ketones Urine (mg/dL)   Date Value   07/29/2025 Negative     Specific Gravity Urine (no units)   Date Value   07/29/2025 1.010     pH Urine (no units)   Date Value   07/29/2025 6.0     Protein Albumin Urine (mg/dL)   Date Value   07/29/2025 Negative     Urobilinogen Urine (E.U./dL)   Date Value   07/29/2025 0.2     Nitrite Urine (no units)   Date Value   07/29/2025 Negative     Leukocyte Esterase Urine (no units)   Date Value   07/29/2025 Negative       Ucr (7/25): 51.1  Ualb (7/25): < 12.0    Last Comprehensive Metabolic Panel:  Sodium   Date Value Ref Range Status   07/11/2025 139 135 - 145 mmol/L Final     Potassium   Date Value Ref Range Status   07/11/2025 3.9 3.4 - 5.3 mmol/L Final   10/19/2020 3.6 3.5 - 5.0 mmol/L Final     Chloride   Date Value Ref Range Status   07/11/2025 107 98 - 107 mmol/L Final   10/19/2020 105 98 - 107 mmol/L Final     Carbon Dioxide (CO2)   Date Value Ref Range Status   07/11/2025 22 22 - 29 mmol/L Final   10/19/2020 25 22 - 31 mmol/L Final     Anion Gap   Date Value Ref Range Status   07/11/2025 10 7 - 15 mmol/L Final   10/19/2020 9 5 - 18 mmol/L Final     Glucose   Date Value Ref Range Status   07/11/2025 85 70 - 99 mg/dL Final   10/19/2020 89 70 - 125 mg/dL Final     Urea Nitrogen   Date Value Ref Range Status   07/11/2025 14.5 8.0 - 23.0 mg/dL Final   10/19/2020 22 8 - 22 mg/dL Final     Creatinine   Date Value Ref Range Status   07/11/2025 0.85 0.51 - 0.95 mg/dL Final     GFR Estimate   Date Value Ref Range Status   07/11/2025 72 >60 mL/min/1.73m2 Final  "    Comment:     eGFR calculated using  CKD-EPI equation.   10/19/2020 >60 >60 mL/min/1.73m2 Final     Calcium   Date Value Ref Range Status   2025 9.5 8.8 - 10.4 mg/dL Final     Bilirubin Total   Date Value Ref Range Status   2025 0.5 <=1.2 mg/dL Final     Alkaline Phosphatase   Date Value Ref Range Status   2025 65 40 - 150 U/L Final     ALT   Date Value Ref Range Status   2025 18 0 - 50 U/L Final     AST   Date Value Ref Range Status   2025 25 0 - 45 U/L Final     Lab Results   Component Value Date    WBC 12.6 2025     Lab Results   Component Value Date    RBC 4.20 2025     Lab Results   Component Value Date    HGB 12.6 2025     Lab Results   Component Value Date    HCT 39.4 2025     No components found for: \"MCT\"  Lab Results   Component Value Date    MCV 94 2025     Lab Results   Component Value Date    MCH 30.0 2025     Lab Results   Component Value Date    MCHC 32.0 2025     Lab Results   Component Value Date    RDW 12.3 2025     Lab Results   Component Value Date     2025     Assessment and Plan    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 Given her endoscopy findings and her response to PPI therapy and correlation of the pain with meal size/frequency, I suspect these symptoms are related to gastroesophageal reflux rather than renal vein compression. We discussed her current reflux regimen and the risks of PPI usage in older patients. I've asked her to speak with her GI doctor regarding titration of her anti-reflux regimen to see if she might gain some more benefit. If they recommend any changes she can trial those changes to see if she has benefit. If no benefit, we can move on to a trial of MPFF as a diagnostic test of venous-origin pain. She would need to trial this for three months. She voiced her understanding. " All of her questions were answered.    - Discuss with GI titration of anti-reflux regimen  - Consider MPFF trial (3 month)  - Clinic visit in 3 months    Jong Andres MD  , Interventional Radiology  [facility]    Visit start time: 11:30 AM  Visit end time: 12:05 PM  Visit duration: 35 minutes    Originating Location (pt. Location): Home  Distant Location (provider location):  On-site    Total work time of 45 minutes spent on the patient's visit today that could have included any of the following activities that I personally performed outside of face-to-face care: documentation review and preparation; obtaining and reviewing additional history; ordering diagnostic/therapeutic services, interpreting results and care coordination with patient and/or other providers.    Please do not hesitate to contact me if you have any questions/concerns.     Sincerely,       Jong Andres MD

## 2025-08-01 ENCOUNTER — APPOINTMENT (OUTPATIENT)
Dept: CT IMAGING | Facility: CLINIC | Age: 70
End: 2025-08-01
Attending: EMERGENCY MEDICINE
Payer: COMMERCIAL

## 2025-08-01 ENCOUNTER — HOSPITAL ENCOUNTER (EMERGENCY)
Facility: CLINIC | Age: 70
Discharge: HOME | End: 2025-08-01
Attending: EMERGENCY MEDICINE | Admitting: EMERGENCY MEDICINE
Payer: COMMERCIAL

## 2025-08-01 VITALS
TEMPERATURE: 97.8 F | DIASTOLIC BLOOD PRESSURE: 70 MMHG | HEIGHT: 65 IN | SYSTOLIC BLOOD PRESSURE: 117 MMHG | HEART RATE: 58 BPM | OXYGEN SATURATION: 100 % | BODY MASS INDEX: 21.66 KG/M2 | RESPIRATION RATE: 16 BRPM | WEIGHT: 130 LBS

## 2025-08-01 DIAGNOSIS — R10.13 EPIGASTRIC PAIN: ICD-10-CM

## 2025-08-01 DIAGNOSIS — K31.9 GASTROPATHY: Primary | ICD-10-CM

## 2025-08-01 LAB
ALBUMIN SERPL BCG-MCNC: 4.1 G/DL (ref 3.5–5.2)
ALBUMIN UR-MCNC: NEGATIVE MG/DL
ALP SERPL-CCNC: 63 U/L (ref 40–150)
ALT SERPL W P-5'-P-CCNC: 18 U/L (ref 0–50)
AMORPH CRY #/AREA URNS HPF: ABNORMAL /HPF
ANION GAP SERPL CALCULATED.3IONS-SCNC: 12 MMOL/L (ref 7–15)
APPEARANCE UR: ABNORMAL
AST SERPL W P-5'-P-CCNC: 27 U/L (ref 0–45)
ATRIAL RATE - MUSE: 63 BPM
BILIRUB SERPL-MCNC: 0.6 MG/DL
BILIRUB UR QL STRIP: NEGATIVE
BUN SERPL-MCNC: 13.6 MG/DL (ref 8–23)
CALCIUM SERPL-MCNC: 9.5 MG/DL (ref 8.8–10.4)
CHLORIDE SERPL-SCNC: 107 MMOL/L (ref 98–107)
COLOR UR AUTO: ABNORMAL
CREAT SERPL-MCNC: 0.86 MG/DL (ref 0.51–0.95)
DIASTOLIC BLOOD PRESSURE - MUSE: NORMAL MMHG
EGFRCR SERPLBLD CKD-EPI 2021: 71 ML/MIN/1.73M2
ERYTHROCYTE [DISTWIDTH] IN BLOOD BY AUTOMATED COUNT: 12.2 % (ref 10–15)
GLUCOSE SERPL-MCNC: 104 MG/DL (ref 70–99)
GLUCOSE UR STRIP-MCNC: NEGATIVE MG/DL
HCO3 SERPL-SCNC: 20 MMOL/L (ref 22–29)
HCT VFR BLD AUTO: 37.6 % (ref 35–47)
HGB BLD-MCNC: 12.5 G/DL (ref 11.7–15.7)
HGB UR QL STRIP: NEGATIVE
HOLD SPECIMEN: NORMAL
HOLD SPECIMEN: NORMAL
INTERPRETATION ECG - MUSE: NORMAL
KETONES UR STRIP-MCNC: 10 MG/DL
LEUKOCYTE ESTERASE UR QL STRIP: NEGATIVE
LIPASE SERPL-CCNC: 42 U/L (ref 13–60)
MAGNESIUM SERPL-MCNC: 1.9 MG/DL (ref 1.7–2.3)
MCH RBC QN AUTO: 30.1 PG (ref 26.5–33)
MCHC RBC AUTO-ENTMCNC: 33.2 G/DL (ref 31.5–36.5)
MCV RBC AUTO: 91 FL (ref 78–100)
MUCOUS THREADS #/AREA URNS LPF: PRESENT /LPF
NITRATE UR QL: NEGATIVE
P AXIS - MUSE: 66 DEGREES
PH UR STRIP: 8 [PH] (ref 5–7)
PLAT MORPH BLD: NORMAL
PLATELET # BLD AUTO: 171 10E3/UL (ref 150–450)
POTASSIUM SERPL-SCNC: 3.7 MMOL/L (ref 3.4–5.3)
PR INTERVAL - MUSE: 168 MS
PROT SERPL-MCNC: 6.1 G/DL (ref 6.4–8.3)
QRS DURATION - MUSE: 80 MS
QT - MUSE: 444 MS
QTC - MUSE: 454 MS
R AXIS - MUSE: 63 DEGREES
RBC # BLD AUTO: 4.15 10E6/UL (ref 3.8–5.2)
RBC MORPH BLD: NORMAL
RBC URINE: 0 /HPF
SODIUM SERPL-SCNC: 139 MMOL/L (ref 135–145)
SP GR UR STRIP: 1.01 (ref 1–1.03)
SQUAMOUS EPITHELIAL: <1 /HPF
SYSTOLIC BLOOD PRESSURE - MUSE: NORMAL MMHG
T AXIS - MUSE: 69 DEGREES
TROPONIN T SERPL HS-MCNC: 6 NG/L
TROPONIN T SERPL HS-MCNC: 7 NG/L
UROBILINOGEN UR STRIP-MCNC: NORMAL MG/DL
VENTRICULAR RATE- MUSE: 63 BPM
WBC # BLD AUTO: 14.7 10E3/UL (ref 4–11)
WBC URINE: <1 /HPF

## 2025-08-01 PROCEDURE — 85007 BL SMEAR W/DIFF WBC COUNT: CPT | Performed by: EMERGENCY MEDICINE

## 2025-08-01 PROCEDURE — 74177 CT ABD & PELVIS W/CONTRAST: CPT | Mod: 26 | Performed by: RADIOLOGY

## 2025-08-01 PROCEDURE — 250N000013 HC RX MED GY IP 250 OP 250 PS 637: Performed by: EMERGENCY MEDICINE

## 2025-08-01 PROCEDURE — 85018 HEMOGLOBIN: CPT | Performed by: EMERGENCY MEDICINE

## 2025-08-01 PROCEDURE — 250N000009 HC RX 250: Performed by: EMERGENCY MEDICINE

## 2025-08-01 PROCEDURE — 81001 URINALYSIS AUTO W/SCOPE: CPT | Performed by: EMERGENCY MEDICINE

## 2025-08-01 PROCEDURE — 96375 TX/PRO/DX INJ NEW DRUG ADDON: CPT | Performed by: EMERGENCY MEDICINE

## 2025-08-01 PROCEDURE — 250N000011 HC RX IP 250 OP 636: Performed by: EMERGENCY MEDICINE

## 2025-08-01 PROCEDURE — 96374 THER/PROPH/DIAG INJ IV PUSH: CPT | Mod: 59 | Performed by: EMERGENCY MEDICINE

## 2025-08-01 PROCEDURE — 258N000003 HC RX IP 258 OP 636: Performed by: EMERGENCY MEDICINE

## 2025-08-01 PROCEDURE — 96361 HYDRATE IV INFUSION ADD-ON: CPT | Performed by: EMERGENCY MEDICINE

## 2025-08-01 PROCEDURE — 83690 ASSAY OF LIPASE: CPT | Performed by: EMERGENCY MEDICINE

## 2025-08-01 PROCEDURE — 36415 COLL VENOUS BLD VENIPUNCTURE: CPT | Performed by: EMERGENCY MEDICINE

## 2025-08-01 PROCEDURE — 74177 CT ABD & PELVIS W/CONTRAST: CPT

## 2025-08-01 PROCEDURE — 99284 EMERGENCY DEPT VISIT MOD MDM: CPT | Performed by: EMERGENCY MEDICINE

## 2025-08-01 PROCEDURE — 84155 ASSAY OF PROTEIN SERUM: CPT | Performed by: EMERGENCY MEDICINE

## 2025-08-01 PROCEDURE — 83735 ASSAY OF MAGNESIUM: CPT | Performed by: EMERGENCY MEDICINE

## 2025-08-01 PROCEDURE — 84484 ASSAY OF TROPONIN QUANT: CPT | Performed by: EMERGENCY MEDICINE

## 2025-08-01 PROCEDURE — 99285 EMERGENCY DEPT VISIT HI MDM: CPT | Mod: 25 | Performed by: EMERGENCY MEDICINE

## 2025-08-01 RX ORDER — SUCRALFATE 1 G/1
1 TABLET ORAL 2 TIMES DAILY
Qty: 60 TABLET | Refills: 0 | Status: SHIPPED | OUTPATIENT
Start: 2025-08-01

## 2025-08-01 RX ORDER — ONDANSETRON 2 MG/ML
4 INJECTION INTRAMUSCULAR; INTRAVENOUS ONCE
Status: COMPLETED | OUTPATIENT
Start: 2025-08-01 | End: 2025-08-01

## 2025-08-01 RX ORDER — SUCRALFATE ORAL 1 G/10ML
1 SUSPENSION ORAL ONCE
Status: COMPLETED | OUTPATIENT
Start: 2025-08-01 | End: 2025-08-01

## 2025-08-01 RX ORDER — IOPAMIDOL 755 MG/ML
80 INJECTION, SOLUTION INTRAVASCULAR ONCE
Status: COMPLETED | OUTPATIENT
Start: 2025-08-01 | End: 2025-08-01

## 2025-08-01 RX ORDER — SUCRALFATE 1 G/1
1 TABLET ORAL 2 TIMES DAILY
Qty: 60 TABLET | Refills: 0 | Status: SHIPPED | OUTPATIENT
Start: 2025-08-01 | End: 2025-08-01

## 2025-08-01 RX ADMIN — SUCRALFATE 1 G: 1 SUSPENSION ORAL at 14:29

## 2025-08-01 RX ADMIN — SODIUM CHLORIDE 1000 ML: 0.9 INJECTION, SOLUTION INTRAVENOUS at 16:41

## 2025-08-01 RX ADMIN — IOPAMIDOL 80 ML: 755 INJECTION, SOLUTION INTRAVENOUS at 15:00

## 2025-08-01 RX ADMIN — ONDANSETRON 4 MG: 2 INJECTION INTRAMUSCULAR; INTRAVENOUS at 14:28

## 2025-08-01 RX ADMIN — SODIUM CHLORIDE 70 ML: 9 INJECTION, SOLUTION INTRAVENOUS at 15:00

## 2025-08-01 RX ADMIN — PANTOPRAZOLE SODIUM 40 MG: 40 INJECTION, POWDER, FOR SOLUTION INTRAVENOUS at 14:29

## 2025-08-01 ASSESSMENT — COLUMBIA-SUICIDE SEVERITY RATING SCALE - C-SSRS
1. IN THE PAST MONTH, HAVE YOU WISHED YOU WERE DEAD OR WISHED YOU COULD GO TO SLEEP AND NOT WAKE UP?: NO
6. HAVE YOU EVER DONE ANYTHING, STARTED TO DO ANYTHING, OR PREPARED TO DO ANYTHING TO END YOUR LIFE?: NO
2. HAVE YOU ACTUALLY HAD ANY THOUGHTS OF KILLING YOURSELF IN THE PAST MONTH?: NO

## 2025-08-01 ASSESSMENT — ACTIVITIES OF DAILY LIVING (ADL)
ADLS_ACUITY_SCORE: 41

## 2025-08-04 LAB
BASOPHILS # BLD MANUAL: 0 10E3/UL (ref 0–0.2)
BASOPHILS NFR BLD MANUAL: 0 %
EOSINOPHIL # BLD MANUAL: 0 10E3/UL (ref 0–0.7)
EOSINOPHIL NFR BLD MANUAL: 0 %
LYMPHOCYTES # BLD MANUAL: 9.7 10E3/UL (ref 0.8–5.3)
LYMPHOCYTES NFR BLD MANUAL: 66 %
MONOCYTES # BLD MANUAL: 0.3 10E3/UL (ref 0–1.3)
MONOCYTES NFR BLD MANUAL: 2 %
NEUTROPHILS # BLD MANUAL: 4.7 10E3/UL (ref 1.6–8.3)
NEUTROPHILS NFR BLD MANUAL: 32 %
PATH REV: ABNORMAL

## 2025-08-10 ENCOUNTER — MYC REFILL (OUTPATIENT)
Dept: INTERNAL MEDICINE | Facility: CLINIC | Age: 70
End: 2025-08-10
Payer: COMMERCIAL

## 2025-08-10 DIAGNOSIS — K21.00 GASTROESOPHAGEAL REFLUX DISEASE WITH ESOPHAGITIS, UNSPECIFIED WHETHER HEMORRHAGE: Primary | ICD-10-CM

## 2025-08-11 RX ORDER — SUCRALFATE 1 G/1
1 TABLET ORAL 2 TIMES DAILY
Qty: 60 TABLET | Refills: 4 | Status: SHIPPED | OUTPATIENT
Start: 2025-08-11

## 2025-08-26 ENCOUNTER — OFFICE VISIT (OUTPATIENT)
Dept: INTERNAL MEDICINE | Facility: CLINIC | Age: 70
End: 2025-08-26
Payer: COMMERCIAL

## 2025-08-26 VITALS
SYSTOLIC BLOOD PRESSURE: 96 MMHG | WEIGHT: 131.1 LBS | TEMPERATURE: 97 F | DIASTOLIC BLOOD PRESSURE: 64 MMHG | HEIGHT: 65 IN | OXYGEN SATURATION: 98 % | BODY MASS INDEX: 21.84 KG/M2 | RESPIRATION RATE: 14 BRPM | HEART RATE: 56 BPM

## 2025-08-26 DIAGNOSIS — R63.4 WEIGHT LOSS: ICD-10-CM

## 2025-08-26 DIAGNOSIS — K31.89 REACTIVE GASTROPATHY: Primary | ICD-10-CM

## 2025-08-26 DIAGNOSIS — K90.49 FOOD INTOLERANCE: ICD-10-CM

## 2025-08-26 PROCEDURE — 3074F SYST BP LT 130 MM HG: CPT | Performed by: INTERNAL MEDICINE

## 2025-08-26 PROCEDURE — 3078F DIAST BP <80 MM HG: CPT | Performed by: INTERNAL MEDICINE

## 2025-08-26 PROCEDURE — 99214 OFFICE O/P EST MOD 30 MIN: CPT | Performed by: INTERNAL MEDICINE

## 2025-08-27 ENCOUNTER — DOCUMENTATION ONLY (OUTPATIENT)
Dept: GASTROENTEROLOGY | Facility: CLINIC | Age: 70
End: 2025-08-27
Payer: COMMERCIAL

## 2025-08-28 ENCOUNTER — MYC MEDICAL ADVICE (OUTPATIENT)
Dept: INTERNAL MEDICINE | Facility: CLINIC | Age: 70
End: 2025-08-28
Payer: COMMERCIAL

## 2025-08-28 DIAGNOSIS — K21.9 GASTROESOPHAGEAL REFLUX DISEASE WITHOUT ESOPHAGITIS: Primary | ICD-10-CM

## 2025-08-28 RX ORDER — OMEPRAZOLE 40 MG/1
40 CAPSULE, DELAYED RELEASE ORAL 2 TIMES DAILY
Qty: 180 CAPSULE | Refills: 2 | Status: SHIPPED | OUTPATIENT
Start: 2025-08-28

## 2025-09-01 ENCOUNTER — PATIENT OUTREACH (OUTPATIENT)
Dept: CARE COORDINATION | Facility: CLINIC | Age: 70
End: 2025-09-01
Payer: COMMERCIAL

## 2025-09-03 ENCOUNTER — PATIENT OUTREACH (OUTPATIENT)
Dept: CARE COORDINATION | Facility: CLINIC | Age: 70
End: 2025-09-03
Payer: COMMERCIAL

## (undated) RX ORDER — LIDOCAINE HYDROCHLORIDE 10 MG/ML
INJECTION, SOLUTION EPIDURAL; INFILTRATION; INTRACAUDAL; PERINEURAL
Status: DISPENSED
Start: 2022-02-11

## (undated) RX ORDER — METHYLPREDNISOLONE ACETATE 40 MG/ML
INJECTION, SUSPENSION INTRA-ARTICULAR; INTRALESIONAL; INTRAMUSCULAR; SOFT TISSUE
Status: DISPENSED
Start: 2022-02-11